# Patient Record
Sex: FEMALE | Race: WHITE | NOT HISPANIC OR LATINO | Employment: OTHER | URBAN - METROPOLITAN AREA
[De-identification: names, ages, dates, MRNs, and addresses within clinical notes are randomized per-mention and may not be internally consistent; named-entity substitution may affect disease eponyms.]

---

## 2018-04-28 ENCOUNTER — HOSPITAL ENCOUNTER (INPATIENT)
Facility: HOSPITAL | Age: 73
LOS: 5 days | Discharge: HOME | DRG: 307 | End: 2018-05-04
Attending: THORACIC SURGERY (CARDIOTHORACIC VASCULAR SURGERY) | Admitting: THORACIC SURGERY (CARDIOTHORACIC VASCULAR SURGERY)
Payer: MEDICARE

## 2018-04-28 DIAGNOSIS — I35.0 NONRHEUMATIC AORTIC VALVE STENOSIS: Primary | ICD-10-CM

## 2018-04-28 DIAGNOSIS — Z95.2 H/O MITRAL VALVE REPLACEMENT WITH MECHANICAL VALVE: ICD-10-CM

## 2018-04-29 LAB
APTT PPP: 32 SECONDS (ref 23–35)
APTT PPP: 40 SECONDS (ref 23–35)
BACTERIA UR QL AUTO: ABNORMAL /HPF
BILIRUB UR QL STRIP: NEGATIVE
CHOLEST SERPL-MCNC: 106 MG/DL (ref 50–200)
CLARITY UR: CLEAR
COLOR UR: YELLOW
ERYTHROCYTE [DISTWIDTH] IN BLOOD BY AUTOMATED COUNT: 14.4 % (ref 11.6–15.1)
EST. AVERAGE GLUCOSE BLD GHB EST-MCNC: 126 MG/DL
GLUCOSE UR STRIP-MCNC: NEGATIVE MG/DL
HBA1C MFR BLD: 6 % (ref 4.2–6.3)
HCT VFR BLD AUTO: 32.8 % (ref 34.8–46.1)
HDLC SERPL-MCNC: 54 MG/DL (ref 40–60)
HGB BLD-MCNC: 10.4 G/DL (ref 11.5–15.4)
HGB UR QL STRIP.AUTO: NEGATIVE
HYALINE CASTS #/AREA URNS LPF: ABNORMAL /LPF
INR PPP: 1.07 (ref 0.86–1.16)
KETONES UR STRIP-MCNC: NEGATIVE MG/DL
LDLC SERPL CALC-MCNC: 33 MG/DL (ref 0–100)
LEUKOCYTE ESTERASE UR QL STRIP: ABNORMAL
MCH RBC QN AUTO: 29.6 PG (ref 26.8–34.3)
MCHC RBC AUTO-ENTMCNC: 31.7 G/DL (ref 31.4–37.4)
MCV RBC AUTO: 93 FL (ref 82–98)
NITRITE UR QL STRIP: NEGATIVE
NON-SQ EPI CELLS URNS QL MICRO: ABNORMAL /HPF
NONHDLC SERPL-MCNC: 52 MG/DL
NT-PROBNP SERPL-MCNC: 882 PG/ML
PH UR STRIP.AUTO: 6.5 [PH] (ref 4.5–8)
PLATELET # BLD AUTO: 251 THOUSANDS/UL (ref 149–390)
PMV BLD AUTO: 11.9 FL (ref 8.9–12.7)
PROT UR STRIP-MCNC: NEGATIVE MG/DL
PROTHROMBIN TIME: 13.9 SECONDS (ref 12.1–14.4)
RBC # BLD AUTO: 3.51 MILLION/UL (ref 3.81–5.12)
RBC #/AREA URNS AUTO: ABNORMAL /HPF
SP GR UR STRIP.AUTO: 1.01 (ref 1–1.03)
TRIGL SERPL-MCNC: 94 MG/DL
UROBILINOGEN UR QL STRIP.AUTO: 0.2 E.U./DL
WBC # BLD AUTO: 5.3 THOUSAND/UL (ref 4.31–10.16)
WBC #/AREA URNS AUTO: ABNORMAL /HPF

## 2018-04-29 PROCEDURE — 81001 URINALYSIS AUTO W/SCOPE: CPT | Performed by: PHYSICIAN ASSISTANT

## 2018-04-29 PROCEDURE — 85730 THROMBOPLASTIN TIME PARTIAL: CPT | Performed by: PHYSICIAN ASSISTANT

## 2018-04-29 PROCEDURE — 85610 PROTHROMBIN TIME: CPT | Performed by: PHYSICIAN ASSISTANT

## 2018-04-29 PROCEDURE — 83036 HEMOGLOBIN GLYCOSYLATED A1C: CPT | Performed by: PHYSICIAN ASSISTANT

## 2018-04-29 PROCEDURE — 80061 LIPID PANEL: CPT | Performed by: PHYSICIAN ASSISTANT

## 2018-04-29 PROCEDURE — 87147 CULTURE TYPE IMMUNOLOGIC: CPT | Performed by: PHYSICIAN ASSISTANT

## 2018-04-29 PROCEDURE — 83880 ASSAY OF NATRIURETIC PEPTIDE: CPT | Performed by: PHYSICIAN ASSISTANT

## 2018-04-29 PROCEDURE — 85027 COMPLETE CBC AUTOMATED: CPT | Performed by: PHYSICIAN ASSISTANT

## 2018-04-29 PROCEDURE — 87081 CULTURE SCREEN ONLY: CPT | Performed by: PHYSICIAN ASSISTANT

## 2018-04-29 PROCEDURE — 85730 THROMBOPLASTIN TIME PARTIAL: CPT | Performed by: THORACIC SURGERY (CARDIOTHORACIC VASCULAR SURGERY)

## 2018-04-29 PROCEDURE — 99223 1ST HOSP IP/OBS HIGH 75: CPT | Performed by: THORACIC SURGERY (CARDIOTHORACIC VASCULAR SURGERY)

## 2018-04-29 RX ORDER — PANTOPRAZOLE SODIUM 40 MG/1
40 TABLET, DELAYED RELEASE ORAL DAILY
Status: ON HOLD | COMMUNITY
End: 2018-05-04

## 2018-04-29 RX ORDER — GABAPENTIN 100 MG/1
100 CAPSULE ORAL
Status: DISCONTINUED | OUTPATIENT
Start: 2018-04-29 | End: 2018-05-04 | Stop reason: HOSPADM

## 2018-04-29 RX ORDER — PANTOPRAZOLE SODIUM 40 MG/1
40 TABLET, DELAYED RELEASE ORAL DAILY
Status: DISCONTINUED | OUTPATIENT
Start: 2018-04-29 | End: 2018-05-04 | Stop reason: HOSPADM

## 2018-04-29 RX ORDER — TEMAZEPAM 15 MG/1
15 CAPSULE ORAL
Status: DISCONTINUED | OUTPATIENT
Start: 2018-04-29 | End: 2018-05-04 | Stop reason: HOSPADM

## 2018-04-29 RX ORDER — PRAVASTATIN SODIUM 20 MG
40 TABLET ORAL
Status: ON HOLD | COMMUNITY
End: 2018-05-04

## 2018-04-29 RX ORDER — MELATONIN
2000 DAILY
Status: ON HOLD | COMMUNITY
End: 2018-05-04

## 2018-04-29 RX ORDER — CLOPIDOGREL BISULFATE 75 MG/1
75 TABLET ORAL DAILY
Status: DISCONTINUED | OUTPATIENT
Start: 2018-04-29 | End: 2018-05-04 | Stop reason: HOSPADM

## 2018-04-29 RX ORDER — LOPERAMIDE HYDROCHLORIDE 2 MG/1
4 CAPSULE ORAL 4 TIMES DAILY PRN
Status: ON HOLD | COMMUNITY
End: 2018-05-04

## 2018-04-29 RX ORDER — ISOSORBIDE DINITRATE 5 MG/1
5 TABLET ORAL EVERY 8 HOURS
Status: ON HOLD | COMMUNITY
End: 2018-05-04

## 2018-04-29 RX ORDER — DIPHENOXYLATE HYDROCHLORIDE AND ATROPINE SULFATE 2.5; .025 MG/1; MG/1
1 TABLET ORAL 4 TIMES DAILY PRN
Status: DISCONTINUED | OUTPATIENT
Start: 2018-04-29 | End: 2018-05-04 | Stop reason: HOSPADM

## 2018-04-29 RX ORDER — ASPIRIN 81 MG/1
81 TABLET, CHEWABLE ORAL DAILY
Status: DISCONTINUED | OUTPATIENT
Start: 2018-04-29 | End: 2018-05-04 | Stop reason: HOSPADM

## 2018-04-29 RX ORDER — CALCIUM POLYCARBOPHIL 625 MG 625 MG/1
625 TABLET ORAL 3 TIMES DAILY
Status: ON HOLD | COMMUNITY
End: 2018-05-04

## 2018-04-29 RX ORDER — MECLIZINE HCL 12.5 MG/1
12.5 TABLET ORAL DAILY
Status: DISCONTINUED | OUTPATIENT
Start: 2018-04-29 | End: 2018-05-04 | Stop reason: HOSPADM

## 2018-04-29 RX ORDER — CLOPIDOGREL BISULFATE 75 MG/1
75 TABLET ORAL DAILY
Status: ON HOLD | COMMUNITY
End: 2018-05-04

## 2018-04-29 RX ORDER — LISINOPRIL 5 MG/1
2.5 TABLET ORAL DAILY
Status: ON HOLD | COMMUNITY
End: 2018-05-04

## 2018-04-29 RX ORDER — AMLODIPINE BESYLATE 2.5 MG/1
2.5 TABLET ORAL DAILY
Status: DISCONTINUED | OUTPATIENT
Start: 2018-04-29 | End: 2018-04-29

## 2018-04-29 RX ORDER — ACETAMINOPHEN 325 MG/1
650 TABLET ORAL EVERY 6 HOURS PRN
Status: DISCONTINUED | OUTPATIENT
Start: 2018-04-29 | End: 2018-05-04 | Stop reason: HOSPADM

## 2018-04-29 RX ORDER — DILTIAZEM HYDROCHLORIDE 120 MG/1
120 TABLET, FILM COATED ORAL 4 TIMES DAILY
Status: ON HOLD | COMMUNITY
End: 2018-05-04

## 2018-04-29 RX ORDER — MELATONIN
2000 DAILY
Status: DISCONTINUED | OUTPATIENT
Start: 2018-04-29 | End: 2018-05-04 | Stop reason: HOSPADM

## 2018-04-29 RX ORDER — TRAMADOL HYDROCHLORIDE 50 MG/1
50 TABLET ORAL EVERY 6 HOURS PRN
Status: DISCONTINUED | OUTPATIENT
Start: 2018-04-29 | End: 2018-05-04 | Stop reason: HOSPADM

## 2018-04-29 RX ORDER — LISINOPRIL 2.5 MG/1
2.5 TABLET ORAL DAILY
Status: DISCONTINUED | OUTPATIENT
Start: 2018-04-29 | End: 2018-04-29

## 2018-04-29 RX ORDER — DIPHENOXYLATE HYDROCHLORIDE AND ATROPINE SULFATE 2.5; .025 MG/1; MG/1
1 TABLET ORAL 4 TIMES DAILY PRN
COMMUNITY

## 2018-04-29 RX ORDER — MELOXICAM 7.5 MG/1
7.5 TABLET ORAL 2 TIMES DAILY PRN
Status: DISCONTINUED | OUTPATIENT
Start: 2018-04-29 | End: 2018-04-29

## 2018-04-29 RX ORDER — PRAVASTATIN SODIUM 40 MG
40 TABLET ORAL
Status: DISCONTINUED | OUTPATIENT
Start: 2018-04-29 | End: 2018-05-04 | Stop reason: HOSPADM

## 2018-04-29 RX ORDER — WARFARIN SODIUM 1 MG/1
2 TABLET ORAL
Status: COMPLETED | OUTPATIENT
Start: 2018-04-29 | End: 2018-04-29

## 2018-04-29 RX ORDER — DILTIAZEM HYDROCHLORIDE 60 MG/1
120 TABLET, FILM COATED ORAL 4 TIMES DAILY
Status: DISCONTINUED | OUTPATIENT
Start: 2018-04-29 | End: 2018-04-29

## 2018-04-29 RX ORDER — FUROSEMIDE 40 MG/1
40 TABLET ORAL DAILY
Status: ON HOLD | COMMUNITY
End: 2018-05-04

## 2018-04-29 RX ORDER — CETIRIZINE HYDROCHLORIDE 10 MG/1
10 TABLET ORAL DAILY
Status: ON HOLD | COMMUNITY
Start: 2016-11-22 | End: 2018-05-04

## 2018-04-29 RX ORDER — GABAPENTIN 100 MG/1
100 CAPSULE ORAL
Status: ON HOLD | COMMUNITY
Start: 2016-11-22 | End: 2018-05-04

## 2018-04-29 RX ORDER — LORATADINE 10 MG/1
10 TABLET ORAL DAILY
Status: DISCONTINUED | OUTPATIENT
Start: 2018-04-29 | End: 2018-05-04 | Stop reason: HOSPADM

## 2018-04-29 RX ORDER — AMLODIPINE BESYLATE 2.5 MG/1
2.5 TABLET ORAL DAILY
Status: ON HOLD | COMMUNITY
End: 2018-05-04

## 2018-04-29 RX ORDER — FUROSEMIDE 40 MG/1
40 TABLET ORAL DAILY
Status: DISCONTINUED | OUTPATIENT
Start: 2018-04-29 | End: 2018-05-04

## 2018-04-29 RX ORDER — HEPARIN SODIUM 10000 [USP'U]/100ML
3-20 INJECTION, SOLUTION INTRAVENOUS
Status: DISCONTINUED | OUTPATIENT
Start: 2018-04-29 | End: 2018-05-04 | Stop reason: HOSPADM

## 2018-04-29 RX ORDER — MELOXICAM 7.5 MG/1
7.5 TABLET ORAL 2 TIMES DAILY PRN
Status: ON HOLD | COMMUNITY
End: 2018-05-04

## 2018-04-29 RX ORDER — ISOSORBIDE DINITRATE 10 MG/1
5 TABLET ORAL EVERY 8 HOURS SCHEDULED
Status: DISCONTINUED | OUTPATIENT
Start: 2018-04-29 | End: 2018-05-04 | Stop reason: HOSPADM

## 2018-04-29 RX ORDER — ASPIRIN 81 MG/1
81 TABLET, CHEWABLE ORAL DAILY
Status: ON HOLD | COMMUNITY
End: 2018-05-04

## 2018-04-29 RX ADMIN — PANTOPRAZOLE SODIUM 40 MG: 40 TABLET, DELAYED RELEASE ORAL at 13:09

## 2018-04-29 RX ADMIN — ASPIRIN 81 MG 81 MG: 81 TABLET ORAL at 13:09

## 2018-04-29 RX ADMIN — ACETAMINOPHEN 650 MG: 325 TABLET, FILM COATED ORAL at 13:08

## 2018-04-29 RX ADMIN — LORATADINE 10 MG: 10 TABLET ORAL at 13:09

## 2018-04-29 RX ADMIN — CLOPIDOGREL BISULFATE 75 MG: 75 TABLET ORAL at 13:09

## 2018-04-29 RX ADMIN — VITAMIN D, TAB 1000IU (100/BT) 2000 UNITS: 25 TAB at 13:09

## 2018-04-29 RX ADMIN — PSYLLIUM HUSK 1 PACKET: 3.4 POWDER ORAL at 13:34

## 2018-04-29 RX ADMIN — PSYLLIUM HUSK 1 PACKET: 3.4 POWDER ORAL at 21:17

## 2018-04-29 RX ADMIN — PRAVASTATIN SODIUM 40 MG: 40 TABLET ORAL at 21:17

## 2018-04-29 RX ADMIN — GABAPENTIN 100 MG: 100 CAPSULE ORAL at 21:17

## 2018-04-29 RX ADMIN — HEPARIN SODIUM 12 UNITS/KG/HR: 10000 INJECTION, SOLUTION INTRAVENOUS at 08:25

## 2018-04-29 RX ADMIN — TRAMADOL HYDROCHLORIDE 50 MG: 50 TABLET, FILM COATED ORAL at 10:02

## 2018-04-29 RX ADMIN — MECLIZINE HCL 12.5 MG 12.5 MG: 12.5 TABLET ORAL at 13:09

## 2018-04-29 RX ADMIN — ISOSORBIDE DINITRATE 5 MG: 10 TABLET ORAL at 21:17

## 2018-04-29 RX ADMIN — TRAMADOL HYDROCHLORIDE 50 MG: 50 TABLET, FILM COATED ORAL at 19:41

## 2018-04-29 RX ADMIN — WARFARIN SODIUM 2 MG: 1 TABLET ORAL at 17:00

## 2018-04-29 NOTE — SOCIAL WORK
CM met with patient at bedside to explain CM role and discuss d/c plan  Pt lives with  Anai Patel [phone] in a bi-level home with 3-4 MARLINE  Pt was independent with ADLs PTA  No DME  Previous HHC approximately 2 years ago but unsure of agency  No history of STR  Preferred Rx:  [facility] in [place]  No history of MH or D/A treatment  Patient/caregiver received discharge checklist  Content reviewed  Patient/caregiver encouraged to participate in discharge plan of care prior to discharge home  CM reviewed d/c planning process including the following: identifying help at home, patient preference for d/c planning needs, Discharge Lounge, [facility] Meds to Bed program, availability of treatment team to discuss questions or concerns patient and/or family may have regarding understanding medications and recognizing signs and symptoms once discharged  CM also encouraged patient to follow up with all recommended appointments after discharge  Patient advised of importance for patient and family to participate in managing patients medical well being

## 2018-04-29 NOTE — PROGRESS NOTES
Pt transferred from [facility] 23 on heparin gtt for CT surg eval for TVAR  No admission ordered  Notified CHARLES Nowak  Was told CHARLES RITCHIE does not admit CT Surg patients and instructed me to call the attending  Notified Diego Stone  He stated, "No orders until the morning " Heparin gtt shut off  Will continue to monitor

## 2018-04-29 NOTE — H&P
H&P Exam - Cardiothoracic Surgery   Ivan Saleh 81 y o  female MRN: [id number]  Unit/Bed#: [facility] 425-01 Encounter: [id number]    Assessment/Plan     Assessment:  Aortic stenosis    Plan:  Pt seen and examined  She will require preoperative TAVR workup, which we will initiate  She appears comfortable and stable, on RA  History of Present Illness   HPI:  Ivan Saleh is a 81 y o  female who was transferred from [address] [place] [address] for the evaluation of aortic stenosis  The patient has had multiple admissions to [place] recently for SOB, chest pain  She was readmitted there on April 21st with CP and SOB  An echo showed moderate AS with a mean gradient of 30mm Hg  She was evaluated by the cardiothoracic team there, who quoted her with a second time redo surgery STS mortality of 14 7% and morbidity of 37%  She was supposed to be transferred to [place] [address], but was told she would not be able to have a TAVR for several weeks and required inpatient hospitalization until her surgery  She was thus transferred to [facility] at the request of her cardiologist, Dr Jeana Kruse  The patient has a history of MR and CAD  She underwent MV Repair St  Victor Manuel Ring and CABG x 2 (CHU, LCx) by Halima Blanco in September 2008  She then underwent Redo Sternotomy, Mitral Valve Replacement (25mm St  Victor Manuel Mechanical) in January 2008 by Dr John Adler  Since then, she has had several cardiac stents placed  Most recently, she was sent to [facility] April 3 and had stents placed to her LIMA and RCA  She lives at home with her  and son  She is active and works part time at a [employer] 4 hours per day  She quit smoking 2 years ago and has a 15 pack year history  She denies alcohol or drug use  She has full upper and lower dentures  Review of Systems   Constitutional: Positive for fatigue  Negative for chills, diaphoresis and fever  HENT: Negative  Eyes: Negative      Respiratory: Positive for chest tightness and shortness of breath  Negative for apnea, choking and stridor  Cardiovascular: Positive for chest pain and leg swelling  Negative for palpitations  Gastrointestinal: Negative for abdominal pain, constipation, diarrhea, nausea and vomiting  Endocrine: Negative  Genitourinary: Negative  Musculoskeletal: Positive for back pain  Skin: Negative  Neurological: Positive for dizziness, syncope and light-headedness  Negative for seizures  Hematological: Negative  Psychiatric/Behavioral: Negative  Historical Information   Past Medical History:   Diagnosis Date    Aortic valvar stenosis     CAD (coronary artery disease) of bypass graft     Chronic CHF (congestive heart failure) ([facility])     DM2 (diabetes mellitus, type 2) ([facility])     H/O Hodgkin's lymphoma     History of cervical cancer     History of ischemic cardiomyopathy     History of uterine cancer     HLD (hyperlipidemia)     HTN (hypertension)     Mitral regurgitation     PAD (peripheral artery disease) ([facility])     Renal artery stenosis ([facility])     s/p renal artery stent    SSS (sick sinus syndrome) ([facility])     s/p ppm     Past Surgical History:   Procedure Laterality Date    APPENDECTOMY      CARDIAC PACEMAKER PLACEMENT      CARDIAC SURGERY  09/2007    MV Repair, CABG x 2 by Dr Brodie Mauro @ [address] [place] [address]  01/2008    Redo Sternotomy, MVR #25mm St  Victor Manuel Mechanical    CHOLECYSTECTOMY      COLECTOMY      RENAL ARTERY STENT      TONSILLECTOMY       Social History   History   Alcohol use Not on file     History   Drug use: Unknown     History   Smoking Status    Not on file   Smokeless Tobacco    Not on file     Family History: positive for coronary disease    Meds/Allergies   PTA meds:   Prior to Admission Medications   Prescriptions Last Dose Informant Patient Reported? Taking?    amLODIPine (NORVASC) 2 5 mg tablet 4/28/2018 at Unknown time  Yes Yes   Sig: Take 2 5 mg by mouth daily   aspirin 81 mg chewable tablet 4/28/2018 at Unknown time  Yes Yes   Sig: Chew 81 mg daily   calcium polycarbophil (FIBERCON) 625 mg tablet Unknown at Unknown time  Yes No   Sig: Take 625 mg by mouth 3 (three) times a day   cetirizine (ZyrTEC) 10 mg tablet Unknown at Unknown time  Yes No   Sig: Take 10 mg by mouth daily   cholecalciferol (VITAMIN D3) 1,000 units tablet 4/28/2018 at Unknown time  Yes Yes   Sig: Take 2,000 Units by mouth daily   clopidogrel (PLAVIX) 75 mg tablet 4/28/2018 at Unknown time  Yes Yes   Sig: Take 75 mg by mouth daily   diltiazem (CARDIZEM) 120 MG tablet 4/28/2018 at Unknown time  Yes Yes   Sig: Take 120 mg by mouth 4 (four) times a day   diphenoxylate-atropine (LOMOTIL) 2 5-0 025 mg per tablet 4/28/2018 at Unknown time  Yes Yes   Sig: Take 1 tablet by mouth 4 (four) times a day as needed   furosemide (LASIX) 40 mg tablet 4/28/2018 at Unknown time  Yes Yes   Sig: Take 40 mg by mouth daily   gabapentin (NEURONTIN) 100 mg capsule 4/28/2018 at Unknown time  Yes Yes   Sig: Take 100 mg by mouth daily at bedtime   isosorbide dinitrate (ISORDIL) 5 mg tablet 4/28/2018 at Unknown time  Yes Yes   Sig: Take 5 mg by mouth every 8 (eight) hours   lisinopril (ZESTRIL) 5 mg tablet 4/28/2018 at Unknown time  Yes Yes   Sig: Take 2 5 mg by mouth daily   loperamide (IMODIUM) 2 mg capsule 4/28/2018 at Unknown time  Yes Yes   Sig: Take 4 mg by mouth 4 (four) times a day as needed for diarrhea   meclizine (ANTIVERT) 32 MG tablet 4/28/2018 at Unknown time  Yes Yes   Sig: Take 12 5 mg by mouth daily   meloxicam (MOBIC) 7 5 mg tablet 4/28/2018 at Unknown time  Yes Yes   Sig: Take 7 5 mg by mouth 2 (two) times a day as needed   pantoprazole (PROTONIX) 40 mg tablet 4/28/2018 at Unknown time  Yes Yes   Sig: Take 40 mg by mouth daily   pravastatin (PRAVACHOL) 20 mg tablet 4/28/2018 at Unknown time  Yes Yes   Sig: Take 40 mg by mouth daily at bedtime      Facility-Administered Medications: None     Allergies   Allergen Reactions    Contrast [Iodinated Diagnostic Agents] Throat Swelling    Penicillins Throat Swelling    Ranolazine Vomiting     ranexa    Sulfa Antibiotics Throat Swelling       Objective   Vitals: Blood pressure (P) 106/51, pulse (P) 70, temperature (P) 98 °F (36 7 °C), temperature source (P) Oral, resp  rate (P) 18, height 4' 11" (1 499 m), weight 53 5 kg (117 lb 15 1 oz), SpO2 (P) 95 %  Invasive Devices     Peripheral Intravenous Line            Peripheral IV 04/29/18 Right;Upper Antecubital less than 1 day                Physical Exam   Constitutional: She is oriented to person, place, and time  She appears well-developed and well-nourished  No distress  HENT:   Head: Normocephalic and atraumatic  Mouth/Throat: No oropharyngeal exudate  Eyes: EOM are normal  Pupils are equal, round, and reactive to light  Neck: Normal range of motion  Neck supple  No JVD present  Cardiovascular: Normal rate and regular rhythm  Murmur heard  Pulmonary/Chest: Effort normal and breath sounds normal  No respiratory distress  She has no wheezes  She has no rales  Abdominal: Soft  Bowel sounds are normal  She exhibits no distension  There is no tenderness  There is no guarding  Musculoskeletal: Normal range of motion  She exhibits edema  She exhibits no tenderness or deformity  Lymphadenopathy:     She has no cervical adenopathy  Neurological: She is alert and oriented to person, place, and time  No cranial nerve deficit  Skin: Skin is warm and dry  No rash noted  She is not diaphoretic  No erythema  Psychiatric: She has a normal mood and affect  Her behavior is normal  Judgment and thought content normal        Lab Results:   I have personally reviewed pertinent reports    , CBC with diff:   Lab Results   Component Value Date    WBC 5 30 04/29/2018    HGB 10 4 (L) 04/29/2018    HCT 32 8 (L) 04/29/2018    MCV 93 04/29/2018     04/29/2018    MCH 29 6 04/29/2018    MCHC 31 7 04/29/2018    RDW 14 4 04/29/2018    MPV 11 9 04/29/2018   , BMP/CMP: No results found for: NA, K, CL, CO2, ANIONGAP, BUN, CREATININE, GLUCOSE, CALCIUM, AST, ALT, ALKPHOS, PROT, ALBUMIN, BILITOT, EGFR, Coags:   Lab Results   Component Value Date    PTT 32 04/29/2018    INR 1 07 04/29/2018     Imaging: I have personally reviewed pertinent reports  Outside imaging will be uploaded into PACs  EKG, Pathology, and Other Studies: I have personally reviewed pertinent reports  Code Status: Level 1 - Full Code  Advance Directive and Living Will:      Power of :    POLST:      Counseling / Coordination of Care  Total floor / unit time spent today 30 minutes  Greater than 50% of total time was spent with the patient and / or family counseling and / or coordination of care  A description of the counseling / coordination of care: preop testing, procedure and postoperative course, risks/benenfits

## 2018-04-30 ENCOUNTER — APPOINTMENT (INPATIENT)
Dept: NON INVASIVE DIAGNOSTICS | Facility: HOSPITAL | Age: 73
DRG: 307 | End: 2018-04-30
Payer: MEDICARE

## 2018-04-30 ENCOUNTER — APPOINTMENT (INPATIENT)
Dept: PULMONOLOGY | Facility: HOSPITAL | Age: 73
DRG: 307 | End: 2018-04-30
Payer: MEDICARE

## 2018-04-30 LAB
ALBUMIN SERPL BCP-MCNC: 3.1 G/DL (ref 3.5–5)
ALP SERPL-CCNC: 75 U/L (ref 46–116)
ALT SERPL W P-5'-P-CCNC: 26 U/L (ref 12–78)
ANION GAP SERPL CALCULATED.3IONS-SCNC: 6 MMOL/L (ref 4–13)
APTT PPP: 60 SECONDS (ref 23–35)
APTT PPP: 75 SECONDS (ref 23–35)
AST SERPL W P-5'-P-CCNC: 29 U/L (ref 5–45)
ATRIAL RATE: 70 BPM
BILIRUB SERPL-MCNC: 0.28 MG/DL (ref 0.2–1)
BUN SERPL-MCNC: 17 MG/DL (ref 5–25)
CALCIUM SERPL-MCNC: 9.1 MG/DL (ref 8.3–10.1)
CHLORIDE SERPL-SCNC: 101 MMOL/L (ref 100–108)
CO2 SERPL-SCNC: 31 MMOL/L (ref 21–32)
CREAT SERPL-MCNC: 1.22 MG/DL (ref 0.6–1.3)
GFR SERPL CREATININE-BSD FRML MDRD: 45 ML/MIN/1.73SQ M
GLUCOSE SERPL-MCNC: 90 MG/DL (ref 65–140)
INR PPP: 1.08 (ref 0.86–1.16)
P AXIS: 66 DEGREES
POTASSIUM SERPL-SCNC: 4.4 MMOL/L (ref 3.5–5.3)
PR INTERVAL: 194 MS
PROT SERPL-MCNC: 7 G/DL (ref 6.4–8.2)
PROTHROMBIN TIME: 14 SECONDS (ref 12.1–14.4)
QRS AXIS: 51 DEGREES
QRSD INTERVAL: 94 MS
QT INTERVAL: 466 MS
QTC INTERVAL: 503 MS
SODIUM SERPL-SCNC: 138 MMOL/L (ref 136–145)
T WAVE AXIS: 185 DEGREES
VENTRICULAR RATE: 70 BPM

## 2018-04-30 PROCEDURE — 93880 EXTRACRANIAL BILAT STUDY: CPT

## 2018-04-30 PROCEDURE — 94060 EVALUATION OF WHEEZING: CPT

## 2018-04-30 PROCEDURE — 76377 3D RENDER W/INTRP POSTPROCES: CPT

## 2018-04-30 PROCEDURE — 99232 SBSQ HOSP IP/OBS MODERATE 35: CPT | Performed by: THORACIC SURGERY (CARDIOTHORACIC VASCULAR SURGERY)

## 2018-04-30 PROCEDURE — 93010 ELECTROCARDIOGRAM REPORT: CPT | Performed by: INTERNAL MEDICINE

## 2018-04-30 PROCEDURE — 94726 PLETHYSMOGRAPHY LUNG VOLUMES: CPT | Performed by: INTERNAL MEDICINE

## 2018-04-30 PROCEDURE — 93325 DOPPLER ECHO COLOR FLOW MAPG: CPT | Performed by: INTERNAL MEDICINE

## 2018-04-30 PROCEDURE — 85610 PROTHROMBIN TIME: CPT | Performed by: PHYSICIAN ASSISTANT

## 2018-04-30 PROCEDURE — 99223 1ST HOSP IP/OBS HIGH 75: CPT | Performed by: INTERNAL MEDICINE

## 2018-04-30 PROCEDURE — 93880 EXTRACRANIAL BILAT STUDY: CPT | Performed by: SURGERY

## 2018-04-30 PROCEDURE — 93312 ECHO TRANSESOPHAGEAL: CPT | Performed by: INTERNAL MEDICINE

## 2018-04-30 PROCEDURE — 94060 EVALUATION OF WHEEZING: CPT | Performed by: INTERNAL MEDICINE

## 2018-04-30 PROCEDURE — 93005 ELECTROCARDIOGRAM TRACING: CPT

## 2018-04-30 PROCEDURE — 80053 COMPREHEN METABOLIC PANEL: CPT | Performed by: PHYSICIAN ASSISTANT

## 2018-04-30 PROCEDURE — 93312 ECHO TRANSESOPHAGEAL: CPT

## 2018-04-30 PROCEDURE — 94760 N-INVAS EAR/PLS OXIMETRY 1: CPT

## 2018-04-30 PROCEDURE — 94726 PLETHYSMOGRAPHY LUNG VOLUMES: CPT

## 2018-04-30 PROCEDURE — 93320 DOPPLER ECHO COMPLETE: CPT | Performed by: INTERNAL MEDICINE

## 2018-04-30 PROCEDURE — 85730 THROMBOPLASTIN TIME PARTIAL: CPT | Performed by: INTERNAL MEDICINE

## 2018-04-30 RX ORDER — SODIUM CHLORIDE 9 MG/ML
INJECTION, SOLUTION INTRAVENOUS CONTINUOUS PRN
Status: DISCONTINUED | OUTPATIENT
Start: 2018-04-30 | End: 2018-04-30 | Stop reason: SURG

## 2018-04-30 RX ORDER — LIDOCAINE HYDROCHLORIDE 10 MG/ML
INJECTION, SOLUTION EPIDURAL; INFILTRATION; INTRACAUDAL; PERINEURAL AS NEEDED
Status: DISCONTINUED | OUTPATIENT
Start: 2018-04-30 | End: 2018-04-30 | Stop reason: SURG

## 2018-04-30 RX ORDER — WARFARIN SODIUM 5 MG/1
5 TABLET ORAL
Status: DISCONTINUED | OUTPATIENT
Start: 2018-04-30 | End: 2018-04-30

## 2018-04-30 RX ORDER — KETAMINE HYDROCHLORIDE 50 MG/ML
INJECTION, SOLUTION, CONCENTRATE INTRAMUSCULAR; INTRAVENOUS AS NEEDED
Status: DISCONTINUED | OUTPATIENT
Start: 2018-04-30 | End: 2018-04-30 | Stop reason: SURG

## 2018-04-30 RX ORDER — PROPOFOL 10 MG/ML
INJECTION, EMULSION INTRAVENOUS AS NEEDED
Status: DISCONTINUED | OUTPATIENT
Start: 2018-04-30 | End: 2018-04-30 | Stop reason: SURG

## 2018-04-30 RX ORDER — GLYCOPYRROLATE 0.2 MG/ML
INJECTION INTRAMUSCULAR; INTRAVENOUS AS NEEDED
Status: DISCONTINUED | OUTPATIENT
Start: 2018-04-30 | End: 2018-04-30 | Stop reason: SURG

## 2018-04-30 RX ORDER — METHYLPREDNISOLONE 16 MG/1
32 TABLET ORAL ONCE
Status: COMPLETED | OUTPATIENT
Start: 2018-05-01 | End: 2018-05-01

## 2018-04-30 RX ORDER — WARFARIN SODIUM 1 MG/1
2 TABLET ORAL
Status: COMPLETED | OUTPATIENT
Start: 2018-04-30 | End: 2018-04-30

## 2018-04-30 RX ORDER — ALBUTEROL SULFATE 2.5 MG/3ML
2.5 SOLUTION RESPIRATORY (INHALATION) ONCE
Status: COMPLETED | OUTPATIENT
Start: 2018-04-30 | End: 2018-04-30

## 2018-04-30 RX ORDER — DIPHENHYDRAMINE HCL 25 MG
50 TABLET ORAL ONCE
Status: COMPLETED | OUTPATIENT
Start: 2018-05-01 | End: 2018-05-01

## 2018-04-30 RX ORDER — METHYLPREDNISOLONE 16 MG/1
32 TABLET ORAL ONCE
Status: COMPLETED | OUTPATIENT
Start: 2018-04-30 | End: 2018-04-30

## 2018-04-30 RX ADMIN — MECLIZINE HCL 12.5 MG 12.5 MG: 12.5 TABLET ORAL at 10:01

## 2018-04-30 RX ADMIN — HEPARIN SODIUM 16 UNITS/KG/HR: 10000 INJECTION, SOLUTION INTRAVENOUS at 09:54

## 2018-04-30 RX ADMIN — PROPOFOL 10 MG: 10 INJECTION, EMULSION INTRAVENOUS at 13:59

## 2018-04-30 RX ADMIN — PROPOFOL 20 MG: 10 INJECTION, EMULSION INTRAVENOUS at 13:46

## 2018-04-30 RX ADMIN — VITAMIN D, TAB 1000IU (100/BT) 2000 UNITS: 25 TAB at 10:00

## 2018-04-30 RX ADMIN — PROPOFOL 10 MG: 10 INJECTION, EMULSION INTRAVENOUS at 14:02

## 2018-04-30 RX ADMIN — GLYCOPYRROLATE 0.1 MG: 0.2 INJECTION, SOLUTION INTRAMUSCULAR; INTRAVENOUS at 13:46

## 2018-04-30 RX ADMIN — PRAVASTATIN SODIUM 40 MG: 40 TABLET ORAL at 21:38

## 2018-04-30 RX ADMIN — PANTOPRAZOLE SODIUM 40 MG: 40 TABLET, DELAYED RELEASE ORAL at 10:01

## 2018-04-30 RX ADMIN — PROPOFOL 10 MG: 10 INJECTION, EMULSION INTRAVENOUS at 14:05

## 2018-04-30 RX ADMIN — TRAMADOL HYDROCHLORIDE 50 MG: 50 TABLET, FILM COATED ORAL at 05:43

## 2018-04-30 RX ADMIN — SACUBITRIL AND VALSARTAN 1 TABLET: 24; 26 TABLET, FILM COATED ORAL at 10:04

## 2018-04-30 RX ADMIN — LORATADINE 10 MG: 10 TABLET ORAL at 10:02

## 2018-04-30 RX ADMIN — ASPIRIN 81 MG 81 MG: 81 TABLET ORAL at 10:02

## 2018-04-30 RX ADMIN — LIDOCAINE HYDROCHLORIDE 50 MG: 10 INJECTION, SOLUTION EPIDURAL; INFILTRATION; INTRACAUDAL; PERINEURAL at 13:46

## 2018-04-30 RX ADMIN — WARFARIN SODIUM 2 MG: 1 TABLET ORAL at 18:24

## 2018-04-30 RX ADMIN — ALBUTEROL SULFATE 2.5 MG: 2.5 SOLUTION RESPIRATORY (INHALATION) at 08:28

## 2018-04-30 RX ADMIN — SACUBITRIL AND VALSARTAN 1 TABLET: 24; 26 TABLET, FILM COATED ORAL at 18:25

## 2018-04-30 RX ADMIN — TRAMADOL HYDROCHLORIDE 50 MG: 50 TABLET, FILM COATED ORAL at 19:44

## 2018-04-30 RX ADMIN — PSYLLIUM HUSK 1 PACKET: 3.4 POWDER ORAL at 18:24

## 2018-04-30 RX ADMIN — PROPOFOL 10 MG: 10 INJECTION, EMULSION INTRAVENOUS at 13:54

## 2018-04-30 RX ADMIN — ISOSORBIDE DINITRATE 5 MG: 10 TABLET ORAL at 18:23

## 2018-04-30 RX ADMIN — KETAMINE HYDROCHLORIDE 5 MG: 50 INJECTION, SOLUTION INTRAMUSCULAR; INTRAVENOUS at 13:48

## 2018-04-30 RX ADMIN — CLOPIDOGREL BISULFATE 75 MG: 75 TABLET ORAL at 10:02

## 2018-04-30 RX ADMIN — GABAPENTIN 100 MG: 100 CAPSULE ORAL at 21:38

## 2018-04-30 RX ADMIN — PROPOFOL 10 MG: 10 INJECTION, EMULSION INTRAVENOUS at 13:48

## 2018-04-30 RX ADMIN — SODIUM CHLORIDE: 0.9 INJECTION, SOLUTION INTRAVENOUS at 13:35

## 2018-04-30 RX ADMIN — METHYLPREDNISOLONE 32 MG: 16 TABLET ORAL at 21:38

## 2018-04-30 RX ADMIN — KETAMINE HYDROCHLORIDE 10 MG: 50 INJECTION, SOLUTION INTRAMUSCULAR; INTRAVENOUS at 13:46

## 2018-04-30 RX ADMIN — PROPOFOL 10 MG: 10 INJECTION, EMULSION INTRAVENOUS at 13:57

## 2018-04-30 RX ADMIN — FUROSEMIDE 40 MG: 40 TABLET ORAL at 10:01

## 2018-04-30 RX ADMIN — PROPOFOL 10 MG: 10 INJECTION, EMULSION INTRAVENOUS at 13:51

## 2018-04-30 RX ADMIN — ISOSORBIDE DINITRATE 5 MG: 10 TABLET ORAL at 21:38

## 2018-04-30 NOTE — PROGRESS NOTES
04/30/18 1800   Clinical Encounter Type   Visited With Patient   Routine Visit Follow-up   Rastafari Encounters   Rastafari Needs Sacred Text   Patient Spiritual Encounters   Spiritual Assessment 4

## 2018-04-30 NOTE — PLAN OF CARE

## 2018-04-30 NOTE — CASE MANAGEMENT
Initial Clinical Review    Admission: Date/Time/Statement: 04/29/2018 @ 0732  Orders Placed This Encounter   Procedures    Inpatient Admission     Standing Status:   Standing     Number of Occurrences:   1     Order Specific Question:   Admitting Physician     Answer: Cleveland Escamilla [[id number]]     Order Specific Question:   Level of Care     Answer:   Med Surg [[id number]]     Order Specific Question:   Estimated length of stay     Answer:   More than 2 Midnights     Order Specific Question:   Certification     Answer:   I certify that inpatient services are medically necessary for this patient for a duration of greater than two midnights  See H&P and MD Progress Notes for additional information about the patient's course of treatment  Presented to the ED @ [facility], transferred to [facility] via EMS, higher level of care    Chief Complaint: Chest Pain & SOB > Aortic Stenosis > TAVR    History of Illness:   Corey Alva is a 81 y o  female who was transferred from [address] [place] [address] for the evaluation of aortic stenosis  The patient has had multiple admissions to [facility] recently for SOB, chest pain  She was readmitted there on April 21st with CP and SOB  An echo showed moderate AS with a mean gradient of 30mm Hg  She was evaluated by the cardiothoracic team there, who quoted her with a second time redo surgery STS mortality of 14 7% and morbidity of 37%  She was supposed to be transferred to [place] [address], but was told she would not be able to have a TAVR for several weeks and required inpatient hospitalization until her surgery   She was thus transferred to [facility] at the request of her cardiologist, Dr Melissa Delgado      ED Vital Signs:   ED Triage Vitals   Temperature Pulse Respirations Blood Pressure SpO2   04/28/18 2300 04/28/18 2300 04/28/18 2300 04/28/18 2300 04/28/18 2300   98 3 °F (36 8 °C) 70 18 (!) 105/48 96 %      Temp Source Heart Rate Source Patient Position - Orthostatic VS BP Location FiO2 (%)   04/28/18 2300 04/29/18 0315 04/28/18 2300 04/28/18 2300 04/30/18 1340   Oral Monitor Lying Left arm 100      Pain Score       04/29/18 0315       No Pain        Wt Readings from Last 1 Encounters:   04/30/18 52 8 kg (116 lb 6 5 oz)       Abnormal Labs/Diagnostic Test Results:     Past Medical/Surgical History:   Past Medical History:   Diagnosis Date    Aortic valvar stenosis     CAD (coronary artery disease) of bypass graft     [facility]cong[facility]ive [facility] failure) ([facility])     DM2 (diabetes mellitus, type 2) ([facility])     H/O Hodgkin's lymphoma     History of cervical cancer     History of ischemic cardiomyopathy     History of uterine cancer     HLD (hyperlipidemia)     HTN (hypertension)     Mitral regurgitation     PAD (peripheral artery disease) ([facility])     Renal artery stenosis ([facility])     SSS (sick sinus syndrome) ([facility])        Admitting Diagnosis: Aortic stenosis [I35 0]    Age/Sex: 81 y o  female    Assessment/Plan:   Assessment:  Aortic stenosis     Plan:  Pt seen and examined  She will require preoperative TAVR workup, which we will initiate  She appears comfortable and stable, on RA     Admission Orders:  Scheduled Meds:   Current Facility-Administered Medications:  acetaminophen 650 mg Oral Q6H PRN Radha Robison PA-C    aspirin 81 mg Oral Daily Radha Robison PA-C    cholecalciferol 2,000 Units Oral Daily Radha Robison PA-C    clopidogrel 75 mg Oral Daily Radha Robison [place]    [START ON 5/1/2018] diphenhydrAMINE 50 mg Oral Once Monika Mosley PA-C    diphenoxylate-atropine 1 tablet Oral 4x Daily PRN Radha Robison PA-C    furosemide 40 mg Oral Daily Radha Robison PA-C    gabapentin 100 mg Oral HS Radha Robison PA-C    heparin (porcine) 3-20 Units/kg/hr (Order-Specific) Intravenous Titrated Radha Robison PA-C Last Rate: 16 Units/kg/hr (04/30/18 0954)   isosorbide dinitrate 5 mg Oral [facility] Harriet Mccabe PA-C    loratadine 10 mg Oral Daily Marielle Amezcua PA-C    meclizine 12 5 mg Oral Daily Marielle Amezcua [place]    methylprednisolone 32 mg Oral Once Bryon Saldaña [place]    [START ON 5/1/2018] methylprednisolone 32 mg Oral Once Bryon Saldaña PA-C    pantoprazole 40 mg Oral Daily Marielle Amezcua PA-C    pravastatin 40 mg Oral HS Marielle Amezcua PA-C    psyllium 1 packet Oral TID Marielle Amezcua PA-C    sacubitril-valsartan 1 tablet Oral BID Harriet Mccabe PA-C    temazepam 15 mg Oral HS PRN Marielle Amezcua PA-C    traMADol 50 mg Oral Q6H PRN Marielle Amezcua PA-C    warfarin 2 mg Oral Once (warfarin) Timothy Merritt PA-C      Continuous Infusions:   heparin (porcine) 3-20 Units/kg/hr (Order-Specific) Last Rate: 16 Units/kg/hr (04/30/18 0954)     3D LORA: pending  CTA chest/abd/pel; pending  Carotids / pending

## 2018-04-30 NOTE — RESTORATIVE TECHNICIAN NOTE
Restorative Specialist Mobility Note       Activity: Bathroom privileges, Chair     Assistive Device: None

## 2018-04-30 NOTE — PROGRESS NOTES
Progress Note - Cardiothoracic Surgery   Steven [facility] 81 y o  female MRN: [id number]  Unit/Bed#: [facility] 425-01 Encounter: [id number]      24 Hour Events:   CTA scheduled for tomorrow, with prep ordered  LORA, carotid ultrasound, pre-op labs, and PFTs have been ordered  Denies CP and SOB      Medications:   Scheduled Meds:  Current Facility-Administered Medications:  acetaminophen 650 mg Oral Q6H PRN [place], PA-C    aspirin 81 mg Oral Daily [place], PA-C    cholecalciferol 2,000 Units Oral Daily [place], PA-C    clopidogrel 75 mg Oral Daily [place], [place]    diphenoxylate-atropine 1 tablet Oral 4x Daily PRN [place], PA-C    furosemide 40 mg Oral Daily [place], PA-C    gabapentin 100 mg Oral HS [place], PA-C    heparin (porcine) 3-20 Units/kg/hr (Order-Specific) Intravenous Titrated [place], PA-C Last Rate: 16 Units/kg/hr (04/30/18 0954)   isosorbide dinitrate 5 mg Oral Q8H [facility] Harriet Mccabe PA-C    loratadine 10 mg Oral Daily [place], PA-SANTIAGO    meclizine 12 5 mg Oral Daily [place], PA-SANTIAGO    pantoprazole 40 mg Oral Daily [facility], PA-C    pravastatin 40 mg Oral HS [place], PA-C    psyllium 1 packet Oral TID [place], PA-C    sacubitril-valsartan 1 tablet Oral BID [place], PA-C    temazepam 15 mg Oral HS PRN [place], PA-C    traMADol 50 mg Oral Q6H PRN [place], PA-C    warfarin 5 mg Oral Once (warfarin) Timothy Merritt PA-C      Continuous Infusions:  heparin (porcine) 3-20 Units/kg/hr (Order-Specific) Last Rate: 16 Units/kg/hr (04/30/18 0954)       Results:     Results from last 7 days  Lab Units 04/29/18  0820   WBC Thousand/uL 5 30   HEMOGLOBIN g/dL 10 4*   HEMATOCRIT % 32 8*   PLATELETS Thousands/uL 251       Results from last 7 days  Lab Units 04/30/18  0456   SODIUM mmol/L 138   POTASSIUM mmol/L 4 4   CHLORIDE mmol/L 101   CO2 mmol/L 31   BUN mg/dL 17   CREATININE mg/dL 1 22   GLUCOSE RANDOM mg/dL 90   CALCIUM mg/dL 9 1       Results from last 7 days  Lab Units 04/30/18  0456 04/29/18  2326 04/29/18  1522 04/29/18  0820   INR  1 08  --   --  1 07   PTT seconds 60* 75* 40* 32       Vitals:   Vitals:    04/30/18 0540 04/30/18 0600 04/30/18 0723 04/30/18 1133   BP: 98/50  136/62 108/50   BP Location: Left arm  Left arm Left arm   Pulse:   70 72   Resp:   20 18   Temp:   98 2 °F (36 8 °C) 98 °F (36 7 °C)   TempSrc:   Oral Oral   SpO2:   96% 97%   Weight:  52 8 kg (116 lb 6 5 oz)     Height:           Physical Exam:  HEENT/NECK:  PERRLA  No jugular venous distention  Cardiac: Regular rate and rhythm  Pulmonary:  Breath clear bilaterally  Abdomen:  Non-tender, Non-distended  Positive bowel sounds  Lower extremities: Extremities warm/dry  Radial/PT/DP pulses 2+ bilaterally  Trace edema B/L  Neuro: Alert and oriented X 3  Sensation is grossly intact  No focal deficits  Skin: Warm/Dry, without rashes or lesions  Assessment:  Patient Active Problem List   Diagnosis    Aortic valvar stenosis    CAD (coronary artery disease) of bypass graft    [facility]congestive [facility] failure) ([facility] )    DM2 (diabetes mellitus, type 2) ([facility] )    HTN (hypertension)    HLD (hyperlipidemia)    History of ischemic cardiomyopathy    Mitral regurgitation     Severe aortic stenosis; Ongoing TAVR workup    Plan:  Patient agreeable to proceed with surgery; Awaiting results of pre-operative studies and labs  Will receive 5 mg Coumadin tonight     Transcatheter aortic valve replacement will be scheduled for with KYE Garcia Re: Fannie Duran PA-C  DATE: April 30, 2018  TIME: 11:52 AM

## 2018-04-30 NOTE — PHYSICIAN ADVISOR
Current patient class: Inpatient  The patient is currently on Hospital Day: 3      The patient was admitted to the hospital at [phone] on 4/28/18 for the following diagnosis:  Aortic stenosis [I35 0]       There is documentation in the medical record of an expected length of stay of at least 2 midnights  The patient is therefore expected to satisfy the 2 midnight benchmark and given the 2 midnight presumption is appropriate for INPATIENT ADMISSION  Given this expectation of a satisfying stay, CMS instructs us that the patient is most often appropriate for inpatient admission under part A provided medical necessity is documented in the chart  After review of the relevant documentation, labs, vital signs and test results, the patient is appropriate for INPATIENT ADMISSION  Admission to the hospital as an inpatient is a complex decision making process which requires the practitioner to consider the patients presenting complaint, history and physical examination and all relevant testing  With this in mind, in this case, the patient was deemed appropriate for INPATIENT ADMISSION  After review of the documentation and testing available at the time of the admission I concur with this clinical determination of medical necessity  Rationale is as follows: The patient is a 81 yrs old Female who presented to the ED at 4/28/2018 11:12 PM with a chief complaint of aortic stenosis  Patient was admitted to the hospital for evaluation by Cardiology in anticipation of TAVR  Patient will require at least 2 midnights in the hospital, and given the nature of the patient's transfer, acute medical care, the patient was appropriate for inpatient admission  The patient present time has satisfied the 2 midnight benchmark and will continue to remain hospitalized further        The patients vitals on arrival were ED Triage Vitals   Temperature Pulse Respirations Blood Pressure SpO2   04/28/18 2300 04/28/18 2300 04/28/18 2300 04/28/18 2300 04/28/18 2300   98 3 °F (36 8 °C) 70 18 (!) 105/48 96 %      Temp Source Heart Rate Source Patient Position - Orthostatic VS BP Location FiO2 (%)   04/28/18 2300 04/29/18 0315 04/28/18 2300 04/28/18 2300 04/30/18 1340   Oral Monitor Lying Left arm 100      Pain Score       04/29/18 0315       No Pain           Past Medical History:   Diagnosis Date    Aortic valvar stenosis     CAD (coronary artery disease) of bypass graft     Carotid stenosis, asymptomatic, bilateral 04/2018    50-69% b/l    [facility]estive [facility]     DM2 (diabetes mellitus, type 2) ([facility]     H/O Hodgkin's lymphoma     History of cervical cancer     History of ischemic cardiomyopathy     History of uterine cancer     HLD (hyperlipidemia)     HTN (hypertension)     LBBB (left bundle branch block)     Mitral regurgitation     PAD (peripheral artery disease) (HCC)     Renal artery stenosis (HCC)     s/p renal artery stent    SSS (sick sinus syndrome) ([facility] )     s/p ppm     Past Surgical History:   Procedure Laterality Date    APPENDECTOMY      CARDIAC PACEMAKER PLACEMENT      CARDIAC SURGERY  09/2007    MV Repair, CABG x 2 by Dr Pk Del Real @ [address] [place] [address]  01/2008    Redo Sternotomy, MVR #25mm St  Victor Manuel Mechanical    CHOLECYSTECTOMY      COLECTOMY      RENAL ARTERY STENT      TONSILLECTOMY             Consults have been placed to:   IP CONSULT TO CARDIOLOGY    Vitals:    04/30/18 0600 04/30/18 0723 04/30/18 1133 04/30/18 1515   BP:  136/62 108/50 98/58   BP Location:  Left arm Left arm Left arm   Pulse:  70 72 70   Resp:  20 18 20   Temp:  98 2 °F (36 8 °C) 98 °F (36 7 °C) 98 1 °F (36 7 °C)   TempSrc:  Oral Oral Oral   SpO2:  96% 97% 98%   Weight: 52 8 kg (116 lb 6 5 oz)      Height:           Most recent labs:    Recent Labs      04/29/18   0820  04/30/18   0456   WBC  5 30   --    HGB  10 4*   --    HCT  32 8*   --    PLT  251   --    K   --   4 4   NA   --   138   CALCIUM   --   9 1 BUN   --   17   CREATININE   --   1 22   INR  1 07  1 08   AST   --   29   ALT   --   26   ALKPHOS   --   75   BILITOT   --   0 28       Scheduled Meds:  Current Facility-Administered Medications:  acetaminophen 650 mg Oral Q6H PRN Nasima Zhu PA-C    aspirin 81 mg Oral Daily Nasima Zhu PA-C    cholecalciferol 2,000 Units Oral Daily Nasima Zhu PA-C    clopidogrel 75 mg Oral Daily Nemesio Ozuna    [START ON 5/1/2018] diphenhydrAMINE 50 mg Oral Once M D C  CONNER Conrad    diphenoxylate-atropine 1 tablet Oral 4x Daily PRN Nasima Zhu PA-C    furosemide 40 mg Oral Daily Nasima Zhu PA-C    gabapentin 100 mg Oral HS Nasima Zhu PA-C    heparin (porcine) 3-20 Units/kg/hr (Order-Specific) Intravenous Titrated Nasima Zhu PA-C Last Rate: 16 Units/kg/hr (04/30/18 0954)   isosorbide dinitrate 5 mg Oral Q8H [facility] Harriet Mccabe PA-C    loratadine 10 mg Oral Daily Nasima Zhu PA-C    meclizine 12 5 mg Oral Daily Nasima Zhu PA-C    methylprednisolone 32 mg Oral Once M D C  CONNER Conrad    [START ON 5/1/2018] methylprednisolone 32 mg Oral Once M D C  CONNER Conrad    pantoprazole 40 mg Oral Daily Nasima Zhu PA-C    pravastatin 40 mg Oral HS Harriet Mccabe PA-C    psyllium 1 packet Oral TID Nasima Zhu PA-C    sacubitril-valsartan 1 tablet Oral BID Harriet Mccabe PA-C    temazepam 15 mg Oral HS PRN Nasima Zhu PA-C    traMADol 50 mg Oral Q6H PRN Nasima Zhu PA-C      Continuous Infusions:  heparin (porcine) 3-20 Units/kg/hr (Order-Specific) Last Rate: 16 Units/kg/hr (04/30/18 0954)     PRN Meds:   acetaminophen    diphenoxylate-atropine    temazepam    traMADol    Surgical procedures (if appropriate):

## 2018-04-30 NOTE — ANESTHESIA POSTPROCEDURE EVALUATION
Post-Op Assessment Note      CV Status:  Stable    Mental Status:  Alert and awake    Hydration Status:  Euvolemic    PONV Controlled:  Controlled    Airway Patency:  Patent    Post Op Vitals Reviewed: Yes          Staff: Anesthesiologist, CRNA           BP   105/58   Temp      Pulse  70   Resp   14   SpO2   100

## 2018-04-30 NOTE — ANESTHESIA PREPROCEDURE EVALUATION
Review of Systems/Medical History          Cardiovascular  Pacemaker/AICD, Hyperlipidemia, Hypertension , Valvular heart disease , aortic valve stenosis, CAD , History of CABG, PVD,   Comment: Sick sinus syndrome status post pacemaker, chronic diastolic heart failure, 2v CABG and mitral valve repair in September 2007, re-do sternotomy and mitral valve replacement in January 2008, since status post stent to SVG, EF 45%, MINE 0 5cm2 per cardiology notes,  Pulmonary  Smoker ( 15 pk-yr, quit 2 years ago) ex-smoker  ,        GI/Hepatic         Comment: Status post renal artery stenting     Endo/Other  Diabetes type 2 Diet controlled,      GYN      Comment: Hx of cervical and uterine cancer     Hematology      Comment: Hodgkin's lymphoma in remission Musculoskeletal       Neurology   Psychology           Physical Exam    Airway    Mallampati score: II  TM Distance: >3 FB  Neck ROM: full     Dental   Comment: Edentulous,     Cardiovascular  Rhythm: regular, Murmur ( systolic ejection murmur),     Pulmonary  Breath sounds clear to auscultation,     Other Findings        Anesthesia Plan  ASA Score- 4     Anesthesia Type- IV sedation with anesthesia with ASA Monitors  Additional Monitors:   Airway Plan:         Plan Factors-  Patient did not smoke on day of surgery  Induction- intravenous  Postoperative Plan-     Informed Consent- Anesthetic plan and risks discussed with patient

## 2018-04-30 NOTE — PROGRESS NOTES
04/30/18 1800   Spiritual Beliefs/Perceptions   Support Systems Spouse/significant other   Stress Factors   Patient Stress Factors None identified   Coping Responses   Patient Coping Accepting   Plan of Care   Comments Pt  asked for Bible she left her Bible at home  Provided presence and pryer      Assessment Completed by: Unit visit

## 2018-04-30 NOTE — CONSULTS
Consultation - Cardiology Team One  Aiyana Painter 81 y o  female MRN: [id number]  Unit/Bed#: [facility] 425-01 Encounter: [id number]    Inpatient consult to Cardiology  Consult performed by: Carmen Dudley ordered by: Ruth Guillen          Physician Requesting Consult: Abigail Varela MD  Reason for Consult / Principal Problem: cardiac management aortic stenosis    History of Present Illness   HPI: Aiyana Painter is a 81y o  year old female who has a history of ICM ( EF 45%), prior PPM 2008 (SSS), chronic sys HF,  HTN, DM2, HL, mitral valve disease and severe symptomatic aortic stenosis who was transferred from [facility] under the advisement of her cardiologist Dr Joe Vora for surgical evaluation of her aortic valve disease  She also has had renal artery stenting, Hodgkins lymphoma in remission, and peripheral arterial disease of the RLE (details unknown)  Records were reviewed in the Media section in epic    She underwent CABG x 2 ( LIMA-LAD; L Cx) and mitral valve repair with a St Victor Manuel ring in September 2007 by Dr Guillaume Spear  Due to progressive mitral regurgitation, she underwent a redo sternotomy and mitral valve replacement with a 25 mm mechanical St Victor Manuel valve Jan 29, 2008  Cardiac catheterization March 16 demonstrated patent prior grafts and a 100% ostial DI lesion and a 75% proximal ramus lesion  She was transferred from [facility] to [facility], and underwent a stent of the SVG to the LAD  A transthoracic echocardiogram showed moderate to severe aortic stenosis with an MINE of 0 5 cm2, and a mean gradient of 28 3  She was readmitted Apr 21 to [facility] with CP and SOB  A repeat echo confirmed moderate AS and a mean gradient of 30mmHg  She has had recurrent near syncope  She was subsequently transferred to [facility] for candidacy for a TAVR  A CTA of the chest/abd /pelvis ,a 3DTEE and PFTs have been ordered by CTS        Transthoracic echocardiogram 4/22/18-  outside hospital  EF 45%  Hypokinesis basal inferior wall; hypokinesis basal-mid inferior wall  Moderate ; moderate AS  Normal prosthetic mitral valve funtion      SH- former smoker, quitting 2 yrs ago after a 15 pk yr hx  FH- positive for premature CAD    Review of Systems   Constitution: Negative for chills, fever, weakness and malaise/fatigue  Cardiovascular: Positive for near-syncope  Negative for chest pain, claudication, cyanosis, dyspnea on exertion, irregular heartbeat, leg swelling, orthopnea, palpitations, paroxysmal nocturnal dyspnea and syncope  Respiratory: Negative for cough and shortness of breath  Gastrointestinal: Negative for anorexia, nausea and vomiting  Neurological: Negative for dizziness, focal weakness, headaches and light-headedness  Historical Information   Past Medical History:   Diagnosis Date    Aortic valvar stenosis     CAD (coronary artery disease) of bypass graft     [facility]est[facility]) [facility]     DM2 (diabetes mellitus, type 2) ([facility])     H/O Hodgkin's lymphoma     History of cervical cancer     History of ischemic cardiomyopathy     History of uterine cancer     HLD (hyperlipidemia)     HTN (hypertension)     Mitral regurgitation     PAD (peripheral artery disease) ([facility])     Renal artery stenosis ([facility])     s/p renal artery stent    SSS (sick sinus syndrome) ([facility])     s/p ppm     Past Surgical History:   Procedure Laterality Date    APPENDECTOMY      CARDIAC PACEMAKER PLACEMENT      CARDIAC SURGERY  09/2007    MV Repair, CABG x 2 by Dr Brianda Espinosa @ [address] [place] [address]  01/2008    Redo Sternotomy, MVR #25mm St  Victor Manuel Mechanical    CHOLECYSTECTOMY      COLECTOMY      RENAL ARTERY STENT      TONSILLECTOMY       History   Alcohol Use No     History   Drug Use No     History   Smoking Status    Former Smoker    Years: 15 00    Types: Cigarettes    Quit date: 2016   Smokeless Tobacco    Never Used     Family History: History reviewed   No pertinent family history  Meds/Allergies   all current active meds have been reviewed, current meds:   Current Facility-Administered Medications   Medication Dose Route Frequency    acetaminophen (TYLENOL) tablet 650 mg  650 mg Oral Q6H PRN    aspirin chewable tablet 81 mg  81 mg Oral Daily    cholecalciferol (VITAMIN D3) tablet 2,000 Units  2,000 Units Oral Daily    clopidogrel (PLAVIX) tablet 75 mg  75 mg Oral Daily    diphenoxylate-atropine (LOMOTIL) 2 5-0 025 mg per tablet 1 tablet  1 tablet Oral 4x Daily PRN    furosemide (LASIX) tablet 40 mg  40 mg Oral Daily    gabapentin (NEURONTIN) capsule 100 mg  100 mg Oral HS    heparin (porcine) 25,000 units in 250 mL infusion (premix)  3-20 Units/kg/hr (Order-Specific) Intravenous Titrated    isosorbide dinitrate (ISORDIL) tablet 5 mg  5 mg Oral Q8H [address]    loratadine (CLARITIN) tablet 10 mg  10 mg Oral Daily    meclizine (ANTIVERT) tablet 12 5 mg  12 5 mg Oral Daily    pantoprazole (PROTONIX) EC tablet 40 mg  40 mg Oral Daily    pravastatin (PRAVACHOL) tablet 40 mg  40 mg Oral HS    psyllium (METAMUCIL) 1 packet  1 packet Oral TID    sacubitril-valsartan (ENTRESTO) 24-26 MG per tablet 1 tablet  1 tablet Oral BID    temazepam (RESTORIL) capsule 15 mg  15 mg Oral HS PRN    traMADol (ULTRAM) tablet 50 mg  50 mg Oral Q6H PRN    and PTA meds:   Prior to Admission Medications   Prescriptions Last Dose Informant Patient Reported? Taking?    amLODIPine (NORVASC) 2 5 mg tablet 4/28/2018 at Unknown time  Yes Yes   Sig: Take 2 5 mg by mouth daily   aspirin 81 mg chewable tablet 4/28/2018 at Unknown time  Yes Yes   Sig: Chew 81 mg daily   calcium polycarbophil (FIBERCON) 625 mg tablet Unknown at Unknown time  Yes No   Sig: Take 625 mg by mouth 3 (three) times a day   cetirizine (ZyrTEC) 10 mg tablet Unknown at Unknown time  Yes No   Sig: Take 10 mg by mouth daily   cholecalciferol (VITAMIN D3) 1,000 units tablet 4/28/2018 at Unknown time  Yes Yes   Sig: Take 2,000 Units by mouth daily   clopidogrel (PLAVIX) 75 mg tablet 4/28/2018 at Unknown time  Yes Yes   Sig: Take 75 mg by mouth daily   diltiazem (CARDIZEM) 120 MG tablet 4/28/2018 at Unknown time  Yes Yes   Sig: Take 120 mg by mouth 4 (four) times a day   diphenoxylate-atropine (LOMOTIL) 2 5-0 025 mg per tablet 4/28/2018 at Unknown time  Yes Yes   Sig: Take 1 tablet by mouth 4 (four) times a day as needed   furosemide (LASIX) 40 mg tablet 4/28/2018 at Unknown time  Yes Yes   Sig: Take 40 mg by mouth daily   gabapentin (NEURONTIN) 100 mg capsule 4/28/2018 at Unknown time  Yes Yes   Sig: Take 100 mg by mouth daily at bedtime   isosorbide dinitrate (ISORDIL) 5 mg tablet 4/28/2018 at Unknown time  Yes Yes   Sig: Take 5 mg by mouth every 8 (eight) hours   lisinopril (ZESTRIL) 5 mg tablet 4/28/2018 at Unknown time  Yes Yes   Sig: Take 2 5 mg by mouth daily   loperamide (IMODIUM) 2 mg capsule 4/28/2018 at Unknown time  Yes Yes   Sig: Take 4 mg by mouth 4 (four) times a day as needed for diarrhea   meclizine (ANTIVERT) 32 MG tablet 4/28/2018 at Unknown time  Yes Yes   Sig: Take 12 5 mg by mouth daily   meloxicam (MOBIC) 7 5 mg tablet 4/28/2018 at Unknown time  Yes Yes   Sig: Take 7 5 mg by mouth 2 (two) times a day as needed   pantoprazole (PROTONIX) 40 mg tablet 4/28/2018 at Unknown time  Yes Yes   Sig: Take 40 mg by mouth daily   pravastatin (PRAVACHOL) 20 mg tablet 4/28/2018 at Unknown time  Yes Yes   Sig: Take 40 mg by mouth daily at bedtime      Facility-Administered Medications: None       heparin (porcine) 3-20 Units/kg/hr (Order-Specific) Last Rate: 16 Units/kg/hr (04/29/18 1658)       Allergies   Allergen Reactions    Contrast [Iodinated Diagnostic Agents] Throat Swelling    Penicillins Throat Swelling    Ranolazine Vomiting     ranexa    Sulfa Antibiotics Throat Swelling       Objective   Vitals: Blood pressure 136/62, pulse 70, temperature 98 2 °F (36 8 °C), temperature source Oral, resp   rate 20, height 4' 11" (1 499 m), weight 52 8 kg (116 lb 6 5 oz), SpO2 96 %  ,     Body mass index is 23 51 kg/m²  ,     Systolic (86HLJ), QHQ:675 , Min:84 , DJX:704     Diastolic (98BZH), ECF:62, Min:42, Max:62            Intake/Output Summary (Last 24 hours) at 04/30/18 0848  Last data filed at 04/30/18 0502   Gross per 24 hour   Intake            995 5 ml   Output             1700 ml   Net           -704 5 ml     Weight (last 2 days)     Date/Time   Weight    04/30/18 0600  52 8 (116 4)    04/28/18 2300  53 5 (117 95)            Invasive Devices     Peripheral Intravenous Line            Peripheral IV 04/29/18 Right;Upper Antecubital 1 day                  Physical Exam   Constitutional: She is oriented to person, place, and time  No distress  HENT:   Head: Normocephalic and atraumatic  Eyes: Conjunctivae and EOM are normal    Neck: Normal range of motion  Neck supple  Cardiovascular: Normal rate, regular rhythm, S1 normal, intact distal pulses and normal pulses  Murmur heard  Crescendo systolic murmur is present with a grade of 5/6   No S2 appreciated  + prostethic mitral valve sounds   Pulmonary/Chest: Effort normal and breath sounds normal    Abdominal: Soft  Bowel sounds are normal    Musculoskeletal: Normal range of motion  Neurological: She is alert and oriented to person, place, and time  Skin: Skin is warm and dry  She is not diaphoretic  Psychiatric: She has a normal mood and affect  Nursing note and vitals reviewed          LABORATORY RESULTS:      CBC with diff:   Results from last 7 days  Lab Units 04/29/18  0820   WBC Thousand/uL 5 30   HEMOGLOBIN g/dL 10 4*   HEMATOCRIT % 32 8*   MCV fL 93   PLATELETS Thousands/uL 251   MCH pg 29 6   MCHC g/dL 31 7   RDW % 14 4   MPV fL 11 9       CMP:  Results from last 7 days  Lab Units 04/30/18  0456   SODIUM mmol/L 138   POTASSIUM mmol/L 4 4   CHLORIDE mmol/L 101   CO2 mmol/L 31   ANION GAP mmol/L 6   BUN mg/dL 17   CREATININE mg/dL 1 22   GLUCOSE RANDOM mg/dL 90   CALCIUM mg/dL 9 1 AST U/L 29   ALT U/L 26   ALK PHOS U/L 75   TOTAL PROTEIN g/dL 7 0   BILIRUBIN TOTAL mg/dL 0 28   EGFR ml/min/1 73sq m 45       BMP:  Results from last 7 days  Lab Units 04/30/18  0456   SODIUM mmol/L 138   POTASSIUM mmol/L 4 4   CHLORIDE mmol/L 101   CO2 mmol/L 31   BUN mg/dL 17   CREATININE mg/dL 1 22   GLUCOSE RANDOM mg/dL 90   CALCIUM mg/dL 9 1          Lab Results   Component Value Date    NTBNP 882 (H) 04/29/2018                   Results from last 7 days  Lab Units 04/29/18  0820   HEMOGLOBIN A1C % 6 0                Results from last 7 days  Lab Units 04/30/18  0456 04/29/18  0820   INR  1 08 1 07     Lipid Profile:   Lab Results   Component Value Date    CHOL 106 04/29/2018     Lab Results   Component Value Date    HDL 54 04/29/2018     Lab Results   Component Value Date    LDLCALC 33 04/29/2018     Lab Results   Component Value Date    TRIG 94 04/29/2018         Imaging: I have personally reviewed pertinent reports  and I have personally reviewed pertinent films in PACS  No results found  EKG reviewed personally:   One will be ordered    Telemetry  A paced 68      Assessment  Principal Problem: Aortic valvar stenosis  Active Problems:    CAD (coronary artery disease) of bypass graft    Chronic CHF (congestive heart failure) ([facility])    DM2 (diabetes mellitus, type 2) ([facility])    HTN (hypertension)    HLD (hyperlipidemia)    History of ischemic cardiomyopathy    Mitral regurgitation      Assessment/ Plan  Severe symptomatic aortic stenosis, MINE 0 5 cm2; mean gradient 30mm Hg (TTE 4/18)  · Testing for TAVR is underway  ICM, EF 45%  Hx mitral regurgitation, S/p mechanical Mitral valve-   · on IV heparin bridge/ coumadin  INR goal 2 5-3 5  · INR 1 08 today  CAD, S/P CABG x 2 ( LIMA-->LAD, SVG-->LCx),Stent SVG-->LAD Apr 3,2018  · On ASA 81 mg/d,Plavix 75 mg/d, isordil 5 mg q8h, statin  chronic systolic HF  · On lasix 40 mg po/d, entresto 24/26mg  SSS,s/p SJ  PPM  HTN  /52  HL-on pravastatin 40 mg/d  LDL 33  Non HDL 52  DM2  HgAic 6 0  Hx renal artery stenting  PAD  LEO  baseline cr 0 9  Is 1 2 today         Thank you for allowing us to participate in this patient's care  This pt will follow up with Dr Joselyn Kerr once discharged  Counseling / Coordination of Care  Total floor / unit time spent today 40 minutes  Greater than 50% of total time was spent with the patient and / or family counseling and / or coordination of care  A description of the counseling / coordination of care: Review of history, current assessment, development of a plan  Code Status: Level 1 - Full Code    ** Please Note: Dragon 360 Dictation voice to text software may have been used in the creation of this document   **

## 2018-05-01 ENCOUNTER — APPOINTMENT (INPATIENT)
Dept: RADIOLOGY | Facility: HOSPITAL | Age: 73
DRG: 307 | End: 2018-05-01
Payer: MEDICARE

## 2018-05-01 LAB
APTT PPP: 71 SECONDS (ref 23–35)
INR PPP: 1.09 (ref 0.86–1.16)
MRSA NOSE QL CULT: ABNORMAL
MRSA NOSE QL CULT: ABNORMAL
PROTHROMBIN TIME: 14.1 SECONDS (ref 12.1–14.4)

## 2018-05-01 PROCEDURE — 74174 CTA ABD&PLVS W/CONTRAST: CPT

## 2018-05-01 PROCEDURE — 75572 CT HRT W/3D IMAGE: CPT

## 2018-05-01 PROCEDURE — 99231 SBSQ HOSP IP/OBS SF/LOW 25: CPT | Performed by: PHYSICIAN ASSISTANT

## 2018-05-01 PROCEDURE — 85610 PROTHROMBIN TIME: CPT | Performed by: PHYSICIAN ASSISTANT

## 2018-05-01 PROCEDURE — 85730 THROMBOPLASTIN TIME PARTIAL: CPT | Performed by: INTERNAL MEDICINE

## 2018-05-01 RX ORDER — WARFARIN SODIUM 1 MG/1
2 TABLET ORAL
Status: COMPLETED | OUTPATIENT
Start: 2018-05-01 | End: 2018-05-01

## 2018-05-01 RX ADMIN — DIPHENHYDRAMINE HCL 50 MG: 25 TABLET ORAL at 08:16

## 2018-05-01 RX ADMIN — MECLIZINE HCL 12.5 MG 12.5 MG: 12.5 TABLET ORAL at 10:11

## 2018-05-01 RX ADMIN — GABAPENTIN 100 MG: 100 CAPSULE ORAL at 21:18

## 2018-05-01 RX ADMIN — TRAMADOL HYDROCHLORIDE 50 MG: 50 TABLET, FILM COATED ORAL at 21:18

## 2018-05-01 RX ADMIN — FUROSEMIDE 40 MG: 40 TABLET ORAL at 10:14

## 2018-05-01 RX ADMIN — IODIXANOL 120 ML: 320 INJECTION, SOLUTION INTRAVASCULAR at 10:05

## 2018-05-01 RX ADMIN — HEPARIN SODIUM 16 UNITS/KG/HR: 10000 INJECTION, SOLUTION INTRAVENOUS at 17:40

## 2018-05-01 RX ADMIN — PANTOPRAZOLE SODIUM 40 MG: 40 TABLET, DELAYED RELEASE ORAL at 10:15

## 2018-05-01 RX ADMIN — CLOPIDOGREL BISULFATE 75 MG: 75 TABLET ORAL at 10:13

## 2018-05-01 RX ADMIN — SACUBITRIL AND VALSARTAN 1 TABLET: 24; 26 TABLET, FILM COATED ORAL at 17:53

## 2018-05-01 RX ADMIN — VITAMIN D, TAB 1000IU (100/BT) 2000 UNITS: 25 TAB at 10:12

## 2018-05-01 RX ADMIN — WARFARIN SODIUM 2 MG: 1 TABLET ORAL at 17:53

## 2018-05-01 RX ADMIN — PSYLLIUM HUSK 1 PACKET: 3.4 POWDER ORAL at 10:38

## 2018-05-01 RX ADMIN — TRAMADOL HYDROCHLORIDE 50 MG: 50 TABLET, FILM COATED ORAL at 10:48

## 2018-05-01 RX ADMIN — ISOSORBIDE DINITRATE 5 MG: 10 TABLET ORAL at 22:16

## 2018-05-01 RX ADMIN — SACUBITRIL AND VALSARTAN 1 TABLET: 24; 26 TABLET, FILM COATED ORAL at 10:37

## 2018-05-01 RX ADMIN — PRAVASTATIN SODIUM 40 MG: 40 TABLET ORAL at 21:18

## 2018-05-01 RX ADMIN — LORATADINE 10 MG: 10 TABLET ORAL at 10:14

## 2018-05-01 RX ADMIN — ISOSORBIDE DINITRATE 5 MG: 10 TABLET ORAL at 16:29

## 2018-05-01 RX ADMIN — ASPIRIN 81 MG 81 MG: 81 TABLET ORAL at 10:13

## 2018-05-01 RX ADMIN — METHYLPREDNISOLONE 32 MG: 16 TABLET ORAL at 06:13

## 2018-05-01 RX ADMIN — ISOSORBIDE DINITRATE 5 MG: 10 TABLET ORAL at 06:13

## 2018-05-01 NOTE — PROGRESS NOTES
Progress Note - Cardiothoracic Surgery   Magnus Godinez 81 y o  female MRN: [id number]  Unit/Bed#: [facility] 425-01 Encounter: [id number]      24 Hour Events: Resting comfortably  No events overnight  LORA completed  For CTA today       Medications:   Scheduled Meds:  Current Facility-Administered Medications:  acetaminophen 650 mg Oral Q6H PRN [place], PA-C    aspirin 81 mg Oral Daily [place], PA-C    cholecalciferol 2,000 Units Oral Daily [place], PA-C    clopidogrel 75 mg Oral Daily [place], PA-C    diphenhydrAMINE 50 mg Oral Once Rosaleen Badder, PA-C    diphenoxylate-atropine 1 tablet Oral 4x Daily PRN [place], PA-C    furosemide 40 mg Oral Daily [place], PA-C    gabapentin 100 mg Oral HS [place], PA-C    heparin (porcine) 3-20 Units/kg/hr (Order-Specific) Intravenous Titrated [place], PA-C Last Rate: 16 Units/kg/hr (04/30/18 0954)   isosorbide dinitrate 5 mg Oral Q8H [address] Harriet Estelle, PA-C    loratadine 10 mg Oral Daily [place], PA-C    meclizine 12 5 mg Oral Daily [place], PA-C    pantoprazole 40 mg Oral Daily [place], PA-C    pravastatin 40 mg Oral HS [place], PA-C    psyllium 1 packet Oral TID [place], PA-C    sacubitril-valsartan 1 tablet Oral BID Harriet Estelle, PA-C    temazepam 15 mg Oral HS PRN [place], PA-C    traMADol 50 mg Oral Q6H PRN [place], PA-C      Continuous Infusions:  heparin (porcine) 3-20 Units/kg/hr (Order-Specific) Last Rate: 16 Units/kg/hr (04/30/18 0954)       Results:     Results from last 7 days  Lab Units 04/29/18  0820   WBC Thousand/uL 5 30   HEMOGLOBIN g/dL 10 4*   HEMATOCRIT % 32 8*   PLATELETS Thousands/uL 251       Results from last 7 days  Lab Units 04/30/18  0456   SODIUM mmol/L 138   POTASSIUM mmol/L 4 4   CHLORIDE mmol/L 101   CO2 mmol/L 31   BUN mg/dL 17   CREATININE mg/dL 1 22   GLUCOSE RANDOM mg/dL 90   CALCIUM mg/dL 9 1       Results from last 7 days  Lab Units 05/01/18  0525 04/30/18  0456 04/29/18  2326  04/29/18  0820   INR  1 09 1 08  --   --  1 07   PTT seconds 71* 60* 75*  < > 32   < > = values in this interval not displayed  Coumadin mg 2 2 2          Studies:     Echocardiogram: EF 45%; Well seated mechanical mitral valve  Trileaflet AV with severe AS and Mild to moderate AI  Carotid artery duplex: 50-69 % Bilateral carotid stenosis with antegrade vertebral flow     CTA: ordered for today    Vitals:   Vitals:    04/30/18 2346 05/01/18 0321 05/01/18 0600 05/01/18 0733   BP: 110/53 96/50  118/58   BP Location: Left arm Left arm  Right arm   Pulse: 70 72  70   Resp: 18 18 18   Temp: 98 3 °F (36 8 °C) 98 3 °F (36 8 °C)  98 °F (36 7 °C)   TempSrc: Oral Oral  Oral   SpO2: 94% 90%  94%   Weight:   52 8 kg (116 lb 6 5 oz)    Height:           Physical Exam:  HEENT/NECK:  PERRLA  No jugular venous distention  Cardiac: Regular rate and rhythm  Pulmonary:  Breath clear bilaterally  Abdomen:  Non-tender, Non-distended  Positive bowel sounds  Lower extremities: Extremities warm/dry  Radial/PT/DP pulses 2+ bilaterally  Trace edema B/L  Neuro: Alert and oriented X 3  Sensation is grossly intact  No focal deficits  Skin: Warm/Dry, without rashes or lesions  Assessment:  Patient Active Problem List   Diagnosis    Aortic valvar stenosis    CAD (coronary artery disease) of bypass graft    [facility] )    DM2 (diabetes mellitus, type 2) ([facility] )    HTN (hypertension)    HLD (hyperlipidemia)    History of ischemic cardiomyopathy    Mitral regurgitation     Severe aortic stenosis;  Ongoing TAVR workup    Plan:  Patient agreeable to proceed with surgery;   LORA completed  CTA ordered for today  Carotids completed  Continue heparin; Coumadin 2 mg dosed  Case discussed with KYE Bosch: Audie Ware PA-C  DATE: May 1, 2018  TIME: 7:59 AM

## 2018-05-02 LAB
APTT PPP: 86 SECONDS (ref 23–35)
INR PPP: 1.43 (ref 0.86–1.16)
PROTHROMBIN TIME: 17.5 SECONDS (ref 12.1–14.4)

## 2018-05-02 PROCEDURE — 99231 SBSQ HOSP IP/OBS SF/LOW 25: CPT | Performed by: PHYSICIAN ASSISTANT

## 2018-05-02 PROCEDURE — 85610 PROTHROMBIN TIME: CPT | Performed by: PHYSICIAN ASSISTANT

## 2018-05-02 PROCEDURE — 99232 SBSQ HOSP IP/OBS MODERATE 35: CPT | Performed by: INTERNAL MEDICINE

## 2018-05-02 PROCEDURE — 85730 THROMBOPLASTIN TIME PARTIAL: CPT | Performed by: THORACIC SURGERY (CARDIOTHORACIC VASCULAR SURGERY)

## 2018-05-02 RX ORDER — WARFARIN SODIUM 5 MG/1
5 TABLET ORAL
Status: COMPLETED | OUTPATIENT
Start: 2018-05-02 | End: 2018-05-02

## 2018-05-02 RX ADMIN — FUROSEMIDE 40 MG: 40 TABLET ORAL at 09:35

## 2018-05-02 RX ADMIN — PRAVASTATIN SODIUM 40 MG: 40 TABLET ORAL at 23:01

## 2018-05-02 RX ADMIN — GABAPENTIN 100 MG: 100 CAPSULE ORAL at 23:02

## 2018-05-02 RX ADMIN — VITAMIN D, TAB 1000IU (100/BT) 2000 UNITS: 25 TAB at 09:35

## 2018-05-02 RX ADMIN — TRAMADOL HYDROCHLORIDE 50 MG: 50 TABLET, FILM COATED ORAL at 15:02

## 2018-05-02 RX ADMIN — CLOPIDOGREL BISULFATE 75 MG: 75 TABLET ORAL at 09:35

## 2018-05-02 RX ADMIN — MECLIZINE HCL 12.5 MG 12.5 MG: 12.5 TABLET ORAL at 09:35

## 2018-05-02 RX ADMIN — LORATADINE 10 MG: 10 TABLET ORAL at 09:35

## 2018-05-02 RX ADMIN — ASPIRIN 81 MG 81 MG: 81 TABLET ORAL at 09:35

## 2018-05-02 RX ADMIN — SACUBITRIL AND VALSARTAN 1 TABLET: 24; 26 TABLET, FILM COATED ORAL at 09:35

## 2018-05-02 RX ADMIN — PANTOPRAZOLE SODIUM 40 MG: 40 TABLET, DELAYED RELEASE ORAL at 09:35

## 2018-05-02 RX ADMIN — HEPARIN SODIUM 16 UNITS/KG/HR: 10000 INJECTION, SOLUTION INTRAVENOUS at 23:49

## 2018-05-02 RX ADMIN — WARFARIN SODIUM 5 MG: 5 TABLET ORAL at 17:29

## 2018-05-02 RX ADMIN — SACUBITRIL AND VALSARTAN 1 TABLET: 24; 26 TABLET, FILM COATED ORAL at 17:29

## 2018-05-02 NOTE — PROGRESS NOTES
General Cardiology   Progress Note -  Team One   Kathy Smith 81 y o  female MRN: [id number]    Unit/Bed#: [facility] 425-01 Encounter: [id number]      Subjective: Pt seen for follow up, no events overnight  Pt feels well this AM, she is still dyspneic on exertion  No chest pain or palpitations  She has been undergoing testing for TAVR  Review of Systems   Constitution: Negative for decreased appetite, fever, weakness and malaise/fatigue  Cardiovascular: Positive for dyspnea on exertion  Negative for chest pain, irregular heartbeat, leg swelling, orthopnea, palpitations and syncope  Respiratory: Negative for cough, sleep disturbances due to breathing and wheezing  Gastrointestinal: Negative for abdominal pain, nausea and vomiting  Genitourinary: Negative for dysuria  Neurological: Negative for dizziness and light-headedness  Psychiatric/Behavioral: Negative for altered mental status  All other systems reviewed and are negative  Objective:   Vitals: Blood pressure 95/50, pulse 70, temperature 98 °F (36 7 °C), temperature source Oral, resp  rate 20, height 4' 11" (1 499 m), weight 52 7 kg (116 lb 2 9 oz), SpO2 92 %  ,     Body mass index is 23 47 kg/m²  ,     Systolic (91STJ), UKS:368 , Min:94 , YLA:835     Diastolic (66BUJ), KHE:68, Min:46, Max:52      Intake/Output Summary (Last 24 hours) at 05/02/18 0958  Last data filed at 05/02/18 0630   Gross per 24 hour   Intake             1216 ml   Output              950 ml   Net              266 ml     Weight (last 2 days)     Date/Time   Weight    05/02/18 0600  52 7 (116 18)    05/01/18 0600  52 8 (116 4)    04/30/18 0600  52 8 (116 4)            Telemetry Review: No significant arrhythmias seen on telemetry review  Sinus rhythm; atrial pacing, no significant events    Physical Exam   Constitutional: She is oriented to person, place, and time  No distress     Pt sitting up in chair in NAD, alert, pleasant and cooperative   HENT:   Head: Normocephalic and atraumatic  Neck: No JVD present  Cardiovascular: Normal rate, regular rhythm and S1 normal     Murmur heard  Systolic murmur is present with a grade of 2/6   Systolic murmur of AS  No LE edema   Pulmonary/Chest: Effort normal and breath sounds normal  No respiratory distress  She has no wheezes  She has no rales  Abdominal: Soft  Musculoskeletal: She exhibits no edema  Neurological: She is alert and oriented to person, place, and time  Skin: Skin is warm and dry  She is not diaphoretic  Psychiatric: She has a normal mood and affect  Her behavior is normal    Nursing note and vitals reviewed      LABORATORY RESULTS      CBC with diff:   Results from last 7 days  Lab Units 04/29/18  0820   WBC Thousand/uL 5 30   HEMOGLOBIN g/dL 10 4*   HEMATOCRIT % 32 8*   MCV fL 93   PLATELETS Thousands/uL 251   MCH pg 29 6   MCHC g/dL 31 7   RDW % 14 4   MPV fL 11 9     CMP:  Results from last 7 days  Lab Units 04/30/18  0456   SODIUM mmol/L 138   POTASSIUM mmol/L 4 4   CHLORIDE mmol/L 101   CO2 mmol/L 31   ANION GAP mmol/L 6   BUN mg/dL 17   CREATININE mg/dL 1 22   GLUCOSE RANDOM mg/dL 90   CALCIUM mg/dL 9 1   AST U/L 29   ALT U/L 26   ALK PHOS U/L 75   TOTAL PROTEIN g/dL 7 0   BILIRUBIN TOTAL mg/dL 0 28   EGFR ml/min/1 73sq m 45     BMP:  Results from last 7 days  Lab Units 04/30/18  0456   SODIUM mmol/L 138   POTASSIUM mmol/L 4 4   CHLORIDE mmol/L 101   CO2 mmol/L 31   BUN mg/dL 17   CREATININE mg/dL 1 22   GLUCOSE RANDOM mg/dL 90   CALCIUM mg/dL 9 1     Lab Results   Component Value Date    NTBNP 882 (H) 04/29/2018      Results from last 7 days  Lab Units 04/29/18  0820   HEMOGLOBIN A1C % 6 0       Results from last 7 days  Lab Units 05/02/18  0514 05/01/18  0525 04/30/18  0456 04/29/18  0820   INR  1 43* 1 09 1 08 1 07     Lipid Profile:   Lab Results   Component Value Date    CHOL 106 04/29/2018     Lab Results   Component Value Date    HDL 54 04/29/2018     Lab Results   Component Value Date    LDLCALC 33 04/29/2018     Lab Results   Component Value Date    TRIG 94 04/29/2018     Meds/Allergies   current meds:   Current Facility-Administered Medications   Medication Dose Route Frequency    acetaminophen (TYLENOL) tablet 650 mg  650 mg Oral Q6H PRN    aspirin chewable tablet 81 mg  81 mg Oral Daily    cholecalciferol (VITAMIN D3) tablet 2,000 Units  2,000 Units Oral Daily    clopidogrel (PLAVIX) tablet 75 mg  75 mg Oral Daily    diphenoxylate-atropine (LOMOTIL) 2 5-0 025 mg per tablet 1 tablet  1 tablet Oral 4x Daily PRN    furosemide (LASIX) tablet 40 mg  40 mg Oral Daily    gabapentin (NEURONTIN) capsule 100 mg  100 mg Oral HS    heparin (porcine) 25,000 units in 250 mL infusion (premix)  3-20 Units/kg/hr (Order-Specific) Intravenous Titrated    isosorbide dinitrate (ISORDIL) tablet 5 mg  5 mg Oral Q8H [address]    loratadine (CLARITIN) tablet 10 mg  10 mg Oral Daily    meclizine (ANTIVERT) tablet 12 5 mg  12 5 mg Oral Daily    pantoprazole (PROTONIX) EC tablet 40 mg  40 mg Oral Daily    pravastatin (PRAVACHOL) tablet 40 mg  40 mg Oral HS    psyllium (METAMUCIL) 1 packet  1 packet Oral TID    sacubitril-valsartan (ENTRESTO) 24-26 MG per tablet 1 tablet  1 tablet Oral BID    temazepam (RESTORIL) capsule 15 mg  15 mg Oral HS PRN    traMADol (ULTRAM) tablet 50 mg  50 mg Oral Q6H PRN    warfarin (COUMADIN) tablet 5 mg  5 mg Oral Once (warfarin)     Prescriptions Prior to Admission   Medication    amLODIPine (NORVASC) 2 5 mg tablet    aspirin 81 mg chewable tablet    cholecalciferol (VITAMIN D3) 1,000 units tablet    clopidogrel (PLAVIX) 75 mg tablet    diltiazem (CARDIZEM) 120 MG tablet    diphenoxylate-atropine (LOMOTIL) 2 5-0 025 mg per tablet    furosemide (LASIX) 40 mg tablet    gabapentin (NEURONTIN) 100 mg capsule    isosorbide dinitrate (ISORDIL) 5 mg tablet    lisinopril (ZESTRIL) 5 mg tablet    loperamide (IMODIUM) 2 mg capsule    meclizine (ANTIVERT) 32 MG tablet    meloxicam (MOBIC) 7 5 mg tablet    pantoprazole (PROTONIX) 40 mg tablet    pravastatin (PRAVACHOL) 20 mg tablet    calcium polycarbophil (FIBERCON) 625 mg tablet    cetirizine (ZyrTEC) 10 mg tablet         heparin (porcine) 3-20 Units/kg/hr (Order-Specific) Last Rate: 16 Units/kg/hr (05/01/18 1740)     Assessment:  Principal Problem: Aortic valvar stenosis  Active Problems:    CAD (coronary artery disease) of bypass graft    Chronic CHF (congestive heart failure) ([facility])    DM2 (diabetes mellitus, type 2) ([facility])    HTN (hypertension)    HLD (hyperlipidemia)    History of ischemic cardiomyopathy    Mitral regurgitation      Assessment/ Plan:    Severe symptomatic aortic stenosis: MINE 0 5cm  Undergoing workup and testing for TAVR  OP follow up with CT surgery on 5/16  Pt still dyspneic with exertion but acceptable to patient; no evidence of acute CHF    CAD: with prior bypass and more recent stenting of LAD graft on 4/2/18  No anginal symptoms  On ASA/plavix, statin and isordil    Hx of mechanical MVR: on warfarin chronically  Currently on heparin bridge; INR 1 4 today    Chronic systolic CHF:   She appears well compensated on exam  Continue daily lasix and entresto  HTN: BP control adequate    Dyslipidemia: on statin    Counseling / Coordination of Care  Total floor / unit time spent today 30 minutes  Greater than 50% of total time was spent with the patient and / or family counseling and / or coordination of care  ** Please Note: Dragon 360 Dictation voice to text software may have been used in the creation of this document   **

## 2018-05-02 NOTE — PROGRESS NOTES
Progress Note - Cardiothoracic Surgery   Nadine Fong 81 y o  female MRN: [id number]  Unit/Bed#: [facility] 425-01 Encounter: [id number]      24 Hour Events: Resting comfortably  No events overnight  CTA completed today       Medications:   Scheduled Meds:    Current Facility-Administered Medications:  acetaminophen 650 mg Oral Q6H PRN Norrine Ziebach, PA-C    aspirin 81 mg Oral Daily Norrine Lidia, PA-C    cholecalciferol 2,000 Units Oral Daily Norrine Lidia, PA-C    clopidogrel 75 mg Oral Daily Norrine Ziebach, [place]    diphenoxylate-atropine 1 tablet Oral 4x Daily PRN Norrine Lidia, PA-C    furosemide 40 mg Oral Daily Norrine Lidia, PA-C    gabapentin 100 mg Oral HS Norrine Ziebach, PA-C    heparin (porcine) 3-20 Units/kg/hr (Order-Specific) Intravenous Titrated Norrine Lidia, PA-C Last Rate: 16 Units/kg/hr (05/01/18 1740)   isosorbide dinitrate 5 mg Oral Q8H [address] Harriet Estelle, PA-C    loratadine 10 mg Oral Daily Norrine Lidia, PA-C    meclizine 12 5 mg Oral Daily Norrine Ziebach, PA-C    pantoprazole 40 mg Oral Daily Norrine Ziebach, PA-C    pravastatin 40 mg Oral HS Norrine Lidia, PA-C    psyllium 1 packet Oral TID Norrine Ziebach, PA-C    sacubitril-valsartan 1 tablet Oral BID Harriet Estelle, PA-C    temazepam 15 mg Oral HS PRN Norrine Lidia, PA-C    traMADol 50 mg Oral Q6H PRN Norrine Lidia, PA-C      Continuous Infusions:    heparin (porcine) 3-20 Units/kg/hr (Order-Specific) Last Rate: 16 Units/kg/hr (05/01/18 1740)       Results:     Results from last 7 days  Lab Units 04/29/18  0820   WBC Thousand/uL 5 30   HEMOGLOBIN g/dL 10 4*   HEMATOCRIT % 32 8*   PLATELETS Thousands/uL 251       Results from last 7 days  Lab Units 04/30/18  0456   SODIUM mmol/L 138   POTASSIUM mmol/L 4 4   CHLORIDE mmol/L 101   CO2 mmol/L 31   BUN mg/dL 17   CREATININE mg/dL 1 22   GLUCOSE RANDOM mg/dL 90   CALCIUM mg/dL 9 1       Results from last 7 days  Lab Units 05/02/18  4995 05/02/18  0514 05/01/18  1638 04/30/18  0456   INR   --  1 43* 1 09 1 08   PTT seconds 86*  --  71* 60*     Coumadin mg 5 2 2 2         Studies:     Echocardiogram: EF 45%; Well seated mechanical mitral valve  Trileaflet AV with severe AS and Mild to moderate AI  Carotid artery duplex: 50-69 % Bilateral carotid stenosis with antegrade vertebral flow     CTA: Completed     Vitals:   Vitals:    05/02/18 0000 05/02/18 0344 05/02/18 0600 05/02/18 0700   BP: (!) 101/46 106/50  95/50   BP Location: Left arm Left arm  Left arm   Pulse: 70 70  70   Resp: 18 18  20   Temp: 97 5 °F (36 4 °C) 97 5 °F (36 4 °C)  98 °F (36 7 °C)   TempSrc: Oral Oral  Oral   SpO2: 92% 90%  92%   Weight:   52 7 kg (116 lb 2 9 oz)    Height:           Physical Exam:    HEENT/NECK:  PERRLA  No jugular venous distention  Cardiac: Regular rate and rhythm  No rubs/murmurs/gallops  Pulmonary:  Breath sounds slightly diminished at the bases bilaterally  Abdomen:  Non-tender, Non-distended  Positive bowel sounds  Lower extremities: Extremities warm/dry  Radial/PT/DP pulses 2+ bilaterally  No edema B/L  Neuro: Alert and oriented X 3  Sensation is grossly intact  No focal deficits  Skin: Warm/Dry, without rashes or lesions  Assessment:  Patient Active Problem List   Diagnosis    Aortic valvar stenosis    CAD (coronary artery disease) of bypass graft    Chronic [facility]F (congestive [facility] failure) ([facility] )    DM2 (diabetes mellitus, type 2) ([facility] )    HTN (hypertension)    HLD (hyperlipidemia)    History of ischemic cardiomyopathy    Mitral regurgitation     Severe aortic stenosis; Ongoing TAVR workup    Plan:  Patient agreeable to proceed with surgery;   INR 1 43; Coumadin, 5 mg today  Continue heparin until INR therpeutic    LORA completed  CTA completed today  Carotids completed  Continue heparin; Coumadin 2 mg dosed  Case discussed with Dr Marialuisa Whitley M BENJI Arias: Ernesto Donaldson PA-C  DATE: May 2, 2018  TIME: 9:10 AM

## 2018-05-03 PROBLEM — I35.0 AORTIC VALVE STENOSIS: Status: ACTIVE | Noted: 2018-05-03

## 2018-05-03 LAB
APTT PPP: 83 SECONDS (ref 23–35)
INR PPP: 1.71 (ref 0.86–1.16)
PROTHROMBIN TIME: 20.2 SECONDS (ref 12.1–14.4)

## 2018-05-03 PROCEDURE — 85610 PROTHROMBIN TIME: CPT | Performed by: PHYSICIAN ASSISTANT

## 2018-05-03 PROCEDURE — 99232 SBSQ HOSP IP/OBS MODERATE 35: CPT | Performed by: INTERNAL MEDICINE

## 2018-05-03 PROCEDURE — 99232 SBSQ HOSP IP/OBS MODERATE 35: CPT | Performed by: THORACIC SURGERY (CARDIOTHORACIC VASCULAR SURGERY)

## 2018-05-03 PROCEDURE — 85730 THROMBOPLASTIN TIME PARTIAL: CPT | Performed by: THORACIC SURGERY (CARDIOTHORACIC VASCULAR SURGERY)

## 2018-05-03 RX ORDER — WARFARIN SODIUM 5 MG/1
5 TABLET ORAL
Status: COMPLETED | OUTPATIENT
Start: 2018-05-03 | End: 2018-05-03

## 2018-05-03 RX ADMIN — MECLIZINE HCL 12.5 MG 12.5 MG: 12.5 TABLET ORAL at 09:06

## 2018-05-03 RX ADMIN — CLOPIDOGREL BISULFATE 75 MG: 75 TABLET ORAL at 09:06

## 2018-05-03 RX ADMIN — TRAMADOL HYDROCHLORIDE 50 MG: 50 TABLET, FILM COATED ORAL at 19:37

## 2018-05-03 RX ADMIN — PANTOPRAZOLE SODIUM 40 MG: 40 TABLET, DELAYED RELEASE ORAL at 09:06

## 2018-05-03 RX ADMIN — ASPIRIN 81 MG 81 MG: 81 TABLET ORAL at 09:05

## 2018-05-03 RX ADMIN — HEPARIN SODIUM 16 UNITS/KG/HR: 10000 INJECTION, SOLUTION INTRAVENOUS at 16:42

## 2018-05-03 RX ADMIN — FUROSEMIDE 40 MG: 40 TABLET ORAL at 09:06

## 2018-05-03 RX ADMIN — TRAMADOL HYDROCHLORIDE 50 MG: 50 TABLET, FILM COATED ORAL at 03:41

## 2018-05-03 RX ADMIN — LORATADINE 10 MG: 10 TABLET ORAL at 09:06

## 2018-05-03 RX ADMIN — VITAMIN D, TAB 1000IU (100/BT) 2000 UNITS: 25 TAB at 09:06

## 2018-05-03 RX ADMIN — SACUBITRIL AND VALSARTAN 1 TABLET: 24; 26 TABLET, FILM COATED ORAL at 09:06

## 2018-05-03 RX ADMIN — GABAPENTIN 100 MG: 100 CAPSULE ORAL at 22:24

## 2018-05-03 RX ADMIN — WARFARIN SODIUM 5 MG: 5 TABLET ORAL at 19:26

## 2018-05-03 RX ADMIN — PRAVASTATIN SODIUM 40 MG: 40 TABLET ORAL at 22:24

## 2018-05-03 NOTE — PROGRESS NOTES
Cardiology Progress Note - Carolina Mane 81 y o  female MRN: [id number]    Unit/Bed#: [facility] 425-01 Encounter: [id number]      Assessment     Severe symptomatic aortic stenosis  CAD with a history of CABG and intervention on her SVG to LAD, residual 75% lesion in her proximal ramus noted  Mechanical mitral valve  Ischemic cardiomyopathy, EF 26%  Chronic diastolic CHF unstable outpatient medical therapy and euvolemic  Sick sinus syndrome with a history of [facility]'s permanent pacemaker  History of renal artery stenting  Peripheral arterial disease  Mild acute kidney injury with creatinine 1 2     Plan:     Continue current meds  Continue heparin drip, INR 1 7, bridging to Coumadin due to mechanical mitral valve  TAVR w/u completed, will eventually have TAVR M a couple weeks      Subjective:   Patient seen and examined        She feels well, she would like to ambulate, she denies palpitations, shortness of breath or lightheadedness    Meds/Allergies   Allergies   Allergen Reactions    Contrast [Iodinated Diagnostic Agents] Throat Swelling    Penicillins Throat Swelling    Ranolazine Vomiting     ranexa    Sulfa Antibiotics Throat Swelling       Current Facility-Administered Medications:     acetaminophen (TYLENOL) tablet 650 mg, 650 mg, Oral, Q6H PRN, Inna Garcia PA-C, 650 mg at 04/29/18 1308    aspirin chewable tablet 81 mg, 81 mg, Oral, Daily, Inna Garcia PA-C, 81 mg at 05/03/18 3749    cholecalciferol (VITAMIN D3) tablet 2,000 Units, 2,000 Units, Oral, Daily, Inna Garcia PA-C, 2,000 Units at 05/03/18 0906    clopidogrel (PLAVIX) tablet 75 mg, 75 mg, Oral, Daily, Inna Garcia PA-C, 75 mg at 05/03/18 0906    diphenoxylate-atropine (LOMOTIL) 2 5-0 025 mg per tablet 1 tablet, 1 tablet, Oral, 4x Daily PRN, Inna Garcia PA-C    furosemide (LASIX) tablet 40 mg, 40 mg, Oral, Daily, Harriet Mccabe PA-C, 40 mg at 05/03/18 0906    gabapentin (NEURONTIN) capsule 100 mg, 100 mg, Oral, HS, Priyank Riley PA-C, 100 mg at 05/02/18 2302    heparin (porcine) 25,000 units in 250 mL infusion (premix), 3-20 Units/kg/hr (Order-Specific), Intravenous, Titrated, Priyank Riley PA-C, Last Rate: 8 mL/hr at 05/02/18 2349, 16 Units/kg/hr at 05/02/18 2349    isosorbide dinitrate (ISORDIL) tablet 5 mg, 5 mg, Oral, Q8H [address], Harriet Mccabe PA-C, 5 mg at 05/01/18 2216    loratadine (CLARITIN) tablet 10 mg, 10 mg, Oral, Daily, Priyank Riley PA-C, 10 mg at 05/03/18 0906    meclizine (ANTIVERT) tablet 12 5 mg, 12 5 mg, Oral, Daily, Harriet Mccabe PA-C, 12 5 mg at 05/03/18 0906    pantoprazole (PROTONIX) EC tablet 40 mg, 40 mg, Oral, Daily, Harriet Mccabe PA-C, 40 mg at 05/03/18 0906    pravastatin (PRAVACHOL) tablet 40 mg, 40 mg, Oral, HS, Harriet Mccabe PA-C, 40 mg at 05/02/18 2301    psyllium (METAMUCIL) 1 packet, 1 packet, Oral, TID, Priyank Riley PA-C, 1 packet at 05/01/18 1038    sacubitril-valsartan (ENTRESTO) 24-26 MG per tablet 1 tablet, 1 tablet, Oral, BID, Priyank Riley PA-C, 1 tablet at 05/03/18 0906    temazepam (RESTORIL) capsule 15 mg, 15 mg, Oral, HS PRN, Priyank Riley PA-C    traMADol (ULTRAM) tablet 50 mg, 50 mg, Oral, Q6H PRN, Priyank Riley PA-C, 50 mg at 05/03/18 0341    warfarin (COUMADIN) tablet 5 mg, 5 mg, Oral, Once (warfarin), Catherine Brown PA-C    Objective   Vitals: Blood pressure (!) 80/52, pulse 75, temperature 97 8 °F (36 6 °C), temperature source Oral, resp  rate 18, height 4' 11" (1 499 m), weight 52 1 kg (114 lb 13 8 oz), SpO2 96 %  , Body mass index is 23 2 kg/m² , Orthostatic Blood Pressures      Most Recent Value   Blood Pressure   80/52 filed at 05/03/2018 1202   Patient Position - Orthostatic VS  Sitting filed at 05/03/2018 6997        Systolic (34NMJ), IKV:24 , Min:80 , ASQ:639     Diastolic (49IVX), MPI:33, Min:41, Max:55      Intake/Output Summary (Last 24 hours) at 05/03/18 1559  Last data filed at 05/03/18 1300   Gross per 24 hour   Intake 640 ml   Output             5100 ml   Net            -4460 ml     Weight (last 2 days)     Date/Time   Weight    05/03/18 0600  52 1 (114 86)    05/02/18 0600  52 7 (116 18)    05/01/18 0600  52 8 (116 4)              No significant arrhythmias seen on telemetry review       Physical Exam:    GEN: Mega Shabazz appears well, alert and oriented x 3, pleasant and cooperative   NECK: supple, no carotid bruits, no JVD or HJR  HEART: normal rate, regular rhythm, S1 click, normal S2, no murmur  LUNGS: clear to auscultation bilaterally; no wheezes, rales, or rhonchi   ABDOMEN: normal bowel sounds, soft, no tenderness, no distention  EXTREMITIES: peripheral pulses normal; no clubbing, cyanosis, or edema      Laboratory Results:        CBC with diff:   Results from last 7 days  Lab Units 04/29/18  0820   WBC Thousand/uL 5 30   HEMOGLOBIN g/dL 10 4*   HEMATOCRIT % 32 8*   MCV fL 93   PLATELETS Thousands/uL 251   MCH pg 29 6   MCHC g/dL 31 7   RDW % 14 4   MPV fL 11 9         CMP:  Results from last 7 days  Lab Units 04/30/18  0456   SODIUM mmol/L 138   POTASSIUM mmol/L 4 4   CHLORIDE mmol/L 101   CO2 mmol/L 31   ANION GAP mmol/L 6   BUN mg/dL 17   CREATININE mg/dL 1 22   GLUCOSE RANDOM mg/dL 90   CALCIUM mg/dL 9 1   AST U/L 29   ALT U/L 26   ALK PHOS U/L 75   TOTAL PROTEIN g/dL 7 0   BILIRUBIN TOTAL mg/dL 0 28   EGFR ml/min/1 73sq m 45         BMP:  Results from last 7 days  Lab Units 04/30/18  0456   SODIUM mmol/L 138   POTASSIUM mmol/L 4 4   CHLORIDE mmol/L 101   CO2 mmol/L 31   BUN mg/dL 17   CREATININE mg/dL 1 22   GLUCOSE RANDOM mg/dL 90   CALCIUM mg/dL 9 1       BNP:  Invalid input(s): NTPROBNP    Magnesium:       Coags:   Results from last 7 days  Lab Units 05/03/18  0605 05/02/18  0292 05/02/18  0514 05/01/18  0525 04/30/18  0456 04/29/18  2326 04/29/18  1522 04/29/18  0820   PTT seconds 83* 86*  --  71* 60* 75* 40* 32   INR  1 71*  --  1 43* 1 09 1 08  --   --  1 07       TSH: @[facility](TSH:1)    Hemoglobin A1C )  Results from last 7 days  Lab Units 04/29/18  0820   HEMOGLOBIN A1C % 6 0       Lipid Profile:   Results from last 7 days  Lab Units 04/29/18  0820   CHOLESTEROL mg/dL 106   TRIGLYCERIDES mg/dL 94   HDL mg/dL 54             Fernando Mckinney MD    Portions of the record may have been created with voice recognition software   Occasional wrong word or "sound a like" substitutions may have occurred due to the inherent limitations of voice recognition software   Read the chart carefully and recognize, using context, where substitutions have occurred

## 2018-05-03 NOTE — CONSULTS
Consultation - Cardiothoracic Surgery   Tea Reyes 81 y o  female MRN: [id number]  Unit/Bed#: [facility] 425-01 Encounter: [id number]    Physician Requesting Consult: Tal Vasquez MD    Reason for Consult / Principal Problem:  Severe aortic stenosis    Consults  History of Present Illness: Tea Reyes is a 81y o  year old female known to our service who was previously seen in consultation by Dr Domenica Dc for evaluation of severe symptomatic aortic stenosis  The patient has a complicated cardiovascular past medical history  She previously had severe mitral regurgitation which was treated with mitral valve repair in 2007  However she had early failure of her mitral valve repair review requiring redo sternotomy and mitral valve replacement with mechanical valve  Since that time she follows with outpatient cardiologist, Dr Eddie Law  Patient presented to [facility] for evaluation of aortic stenosis  Her primary complaint at time of admission was exertional dyspnea and chest pain  Echocardiogram was performed and progression of aortic stenosis into the severe range was identified  By report, she was told that she needed to come to [facility] for more expedient evaluation and treatment  She was transferred to our service and preoperative workup was completed during this hospitalization  She now presents for 2nd surgeon visit in the with Dr Cuca Miller      Past Medical History:  Past Medical History:   Diagnosis Date    Aortic valvar stenosis     CAD (coronary artery disease) of bypass graft     Carotid stenosis, asymptomatic, bilateral 04/2018    50-69% b/l    [facility]     DM2 (diabetes mellitus, type 2) ([facility]     H/O Hodgkin's lymphoma     History of cervical cancer     History of ischemic cardiomyopathy     History of uterine cancer     HLD (hyperlipidemia)     HTN (hypertension)     LBBB (left bundle branch block)     Mitral regurgitation     [facility]per[facility]     Renal artery stenosis ([facility])     s/p renal artery stent    SSS (sick sinus syndrome) ([facility])     s/p ppm       Past Surgical History:   Past Surgical History:   Procedure Laterality Date    APPENDECTOMY      CARDIAC PACEMAKER PLACEMENT      CARDIAC SURGERY  09/2007    MV Repair, CABG x 2 by Dr Bar Mattson @ [address] [facility]  01/2008    Redo Sternotomy, MVR #25mm St  Victor Manuel Mechanical    CHOLECYSTECTOMY      COLECTOMY      RENAL ARTERY STENT      TONSILLECTOMY         Family History:  History reviewed  No pertinent family history  Social History:  History   Alcohol Use No     History   Drug Use No     History   Smoking Status    Former Smoker    Years: 15 00    Types: Cigarettes    Quit date: 2016   Smokeless Tobacco    Never Used     Marital Status: /Civil Union    Home Medications:   Prior to Admission medications    Medication Sig Start Date End Date Taking?  Authorizing Provider   amLODIPine (NORVASC) 2 5 mg tablet Take 2 5 mg by mouth daily   Yes Historical Provider, MD   aspirin 81 mg chewable tablet Chew 81 mg daily   Yes Historical Provider, MD   cholecalciferol (VITAMIN D3) 1,000 units tablet Take 2,000 Units by mouth daily   Yes Historical Provider, MD   clopidogrel (PLAVIX) 75 mg tablet Take 75 mg by mouth daily   Yes Historical Provider, MD   diltiazem (CARDIZEM) 120 MG tablet Take 120 mg by mouth 4 (four) times a day   Yes Historical Provider, MD   diphenoxylate-atropine (LOMOTIL) 2 5-0 025 mg per tablet Take 1 tablet by mouth 4 (four) times a day as needed   Yes Historical Provider, MD   furosemide (LASIX) 40 mg tablet Take 40 mg by mouth daily   Yes Historical Provider, MD   gabapentin (NEURONTIN) 100 mg capsule Take 100 mg by mouth daily at bedtime 11/22/16  Yes Historical Provider, MD   isosorbide dinitrate (ISORDIL) 5 mg tablet Take 5 mg by mouth every 8 (eight) hours   Yes Historical Provider, MD   lisinopril (ZESTRIL) 5 mg tablet Take 2 5 mg by mouth daily   Yes Historical Provider, MD   loperamide (IMODIUM) 2 mg capsule Take 4 mg by mouth 4 (four) times a day as needed for diarrhea   Yes Historical Provider, MD   meclizine (ANTIVERT) 32 MG tablet Take 12 5 mg by mouth daily   Yes Historical Provider, MD   meloxicam (MOBIC) 7 5 mg tablet Take 7 5 mg by mouth 2 (two) times a day as needed   Yes Historical Provider, MD   pantoprazole (PROTONIX) 40 mg tablet Take 40 mg by mouth daily   Yes Historical Provider, MD   pravastatin (PRAVACHOL) 20 mg tablet Take 40 mg by mouth daily at bedtime   Yes Historical Provider, MD   calcium polycarbophil (FIBERCON) 625 mg tablet Take 625 mg by mouth 3 (three) times a day    Historical Provider, MD   cetirizine (ZyrTEC) 10 mg tablet Take 10 mg by mouth daily 11/22/16   Historical Provider, MD       Inpatient Medications:  Scheduled Meds:   Current Facility-Administered Medications:  acetaminophen 650 mg Oral Q6H PRN CHAU Jones-SANTIAGO    aspirin 81 mg Oral Daily Shelvia Gemma PA-C    cholecalciferol 2,000 Units Oral Daily Shelvia Gemma PA-C    clopidogrel 75 mg Oral Daily Shelvia Gemma PA-C    diphenoxylate-atropine 1 tablet Oral 4x Daily PRN CHAU Jones-C    furosemide 40 mg Oral Daily Shelvia Gemma, PA-C    gabapentin 100 mg Oral HS Chriss Menendez PA-C    heparin (porcine) 3-20 Units/kg/hr (Order-Specific) Intravenous Titrated Chriss Menendez PA-C Last Rate: 16 Units/kg/hr (05/02/18 4728)   isosorbide dinitrate 5 mg Oral Q8H [address]    loratadine 10 mg Oral Daily Shelvia CHAU Menendez-SANTIAGO    meclizine 12 5 mg Oral Daily Shelvia CHAU Menendez-C    pantoprazole 40 mg Oral Daily Shelvia Gemma, PA-C    pravastatin 40 mg Oral HS Chriss Menendez PA-C    psyllium 1 packet Oral TID CHAU Jones-C    sacubitril-valsartan 1 tablet Oral BID Harriet Mccabe PA-C    temazepam 15 mg Oral HS PRN ShelCHAU Ballesteros-C    traMADol 50 mg Oral Q6H PRN Tempie Flash, [place]    warfarin 5 mg Oral Once (warfarin) Catie Hubbard PA-C      Continuous Infusions:   heparin (porcine) 3-20 Units/kg/hr (Order-Specific) Last Rate: 16 Units/kg/hr (05/02/18 5229)     PRN Meds:    acetaminophen 650 mg Q6H PRN   diphenoxylate-atropine 1 tablet 4x Daily PRN   temazepam 15 mg HS PRN   traMADol 50 mg Q6H PRN       Allergies: Allergies   Allergen Reactions    Contrast [Iodinated Diagnostic Agents] Throat Swelling    Penicillins Throat Swelling    Ranolazine Vomiting     ranexa    Sulfa Antibiotics Throat Swelling       Review of Systems:  Review of Systems - History obtained from the patient  General ROS: positive for  - fatigue and malaise  Psychological ROS: negative  Ophthalmic ROS: negative  ENT ROS: negative  Allergy and Immunology ROS: negative  Hematological and Lymphatic ROS: negative  Endocrine ROS: negative  Breast ROS: negative for breast lumps  Respiratory ROS: positive for - cough and shortness of breath  Cardiovascular ROS: no chest pain or dyspnea on exertion  Gastrointestinal ROS: no abdominal pain, change in bowel habits, or black or bloody stools  Genito-Urinary ROS: no dysuria, trouble voiding, or hematuria  Musculoskeletal ROS: negative  Neurological ROS: no TIA or stroke symptoms  Dermatological ROS: negative    Vital Signs:     Vitals:    05/03/18 0632 05/03/18 0729 05/03/18 0841 05/03/18 1202   BP: (!) 88/42 90/50 (!) 98/42 (!) 80/52   BP Location:  Left arm  Left arm   Pulse:  70  75   Resp:  20  18   Temp:  97 9 °F (36 6 °C)  97 8 °F (36 6 °C)   TempSrc:  Oral  Oral   SpO2:  98%  96%   Weight:       Height:         Invasive Devices     Peripheral Intravenous Line            Peripheral IV 04/30/18 Right Hand 3 days                Physical Exam:    HEENT/NECK:  PERRLA  No jugular venous distention  Cardiac:Regular rate and rhythm  Pulmonary:  Breath sounds clear bilaterally  Abdomen:  Non-tender, Non-distended  Positive bowel sounds    Lower extremities: Extremities warm/dry  Radial/PT/DP pules 2+ bilaterally  No edema B/L  Neuro: Alert and oriented X 3  Sensation is grossly intact  No focal deficits  Skin: Warm/Dry, without rashes or lesions  Lab Results:       Results from last 7 days  Lab Units 04/29/18  0820   WBC Thousand/uL 5 30   HEMOGLOBIN g/dL 10 4*   HEMATOCRIT % 32 8*   PLATELETS Thousands/uL 251       Results from last 7 days  Lab Units 04/30/18  0456   SODIUM mmol/L 138   POTASSIUM mmol/L 4 4   CHLORIDE mmol/L 101   CO2 mmol/L 31   BUN mg/dL 17   CREATININE mg/dL 1 22   GLUCOSE RANDOM mg/dL 90   CALCIUM mg/dL 9 1       Results from last 7 days  Lab Units 05/03/18  0605 05/02/18  0652 05/02/18  0514 05/01/18  0525   INR  1 71*  --  1 43* 1 09   PTT seconds 83* 86*  --  71*     Lab Results   Component Value Date    HGBA1C 6 0 04/29/2018     No results found for: CKTOTAL, CKMB, CKMBINDEX, TROPONINI    Imaging Studies:     Cardiac Catheterization: Performed at [facility]; Report pending  Echocardiogram: EF 45%, AV trileaflet, mean gradient 30, max 49  Moderate AS, Mod AI, mechanical MV prosthesis with normal function  Trivial TR  I have personally reviewed pertinent reports  and I have personally reviewed pertinent films in PACS    Assessment:  Patient Active Problem List    Diagnosis Date Noted    Aortic valve stenosis 05/03/2018    Nonrheumatic aortic valve stenosis     Coronary artery disease involving coronary bypass graft of native heart without angina pectoris     [facility]     DM2 (diabetes mellitus, type 2) ([facility]     Essential hypertension     HLD (hyperlipidemia)     History of ischemic cardiomyopathy     H/O mitral valve replacement with mechanical valve      Severe aortic stenosis; Ongoing TAVR workup    Plan:    Fidelia Singleton has severe symptomatic aortic stenosis  Based on their STS risk assessment, they have undergone testing for transcatheter aortic valve replacement    The results of these studies have been interpreted by two independent cardiac surgeons who have determined the patient to be High risk for open aortic valve replacement  The risks, benefits, and alternatives to TAVR were discussed in detail with the patient today  They understand and wish to proceed with transfemoral transcatheter aortic valve replacement  Informed consent was obtained and a date for the intervention has been set         SIGNATURE: Adams Johnston PA-C  DATE: May 3, 2018  TIME: 3:54 PM

## 2018-05-03 NOTE — CASE MANAGEMENT
Continued Stay Review    Date: 05/03/2018    Vital Signs: BP (!) 80/52 (BP Location: Left arm)   Pulse 75   Temp 97 8 °F (36 6 °C) (Oral)   Resp 18   Ht 4' 11" (1 499 m)   Wt 52 1 kg (114 lb 13 8 oz)   SpO2 96%   BMI 23 20 kg/m²     Medications:   Scheduled Meds:   Current Facility-Administered Medications:  acetaminophen 650 mg Oral Q6H PRN Gladis Johnson, PA-C    aspirin 81 mg Oral Daily Gladis [place], PA-C    cholecalciferol 2,000 Units Oral Daily Gladis [place], PA-C    clopidogrel 75 mg Oral Daily Gladis [place], PA-C    diphenoxylate-atropine 1 tablet Oral 4x Daily PRN Gladis Johnson, PA-C    furosemide 40 mg Oral Daily Gladis [place], PA-C    gabapentin 100 mg Oral HS Gladis [place], PA-C    heparin (porcine) 3-20 Units/kg/hr (Order-Specific) Intravenous Titrated Gladis Ornelas, PA-C Last Rate: 16 Units/kg/hr (05/02/18 3139)   isosorbide dinitrate 5 mg Oral Q8H [address] Harriet CONNER Mccabe    loratadine 10 mg Oral Daily Gladis [place], PA-C    meclizine 12 5 mg Oral Daily Gladis [place], PA-C    pantoprazole 40 mg Oral Daily Gladis Johnson, PA-C    pravastatin 40 mg Oral HS Gladis Johnson, PA-C    psyllium 1 packet Oral TID Gladis [place], PA-C    sacubitril-valsartan 1 tablet Oral BID Gladis Johnson, PA-C    temazepam 15 mg Oral HS PRN Gladis [place], PA-C    traMADol 50 mg Oral Q6H PRN Gladis [place], PA-C    warfarin 5 mg Oral Once (warfarin) Macey Amanda PA-C      Continuous Infusions:   heparin (porcine) 3-20 Units/kg/hr (Order-Specific) Last Rate: 16 Units/kg/hr (05/02/18 5412)     Abnormal Labs/Diagnostic Results:     Age/Sex: 81 y o  female     Assessment/Plan:   Assessment:      Patient Active Problem List   Diagnosis    Nonrheumatic aortic valve stenosis    Coronary artery disease involving coronary bypass graft of native heart without angina pectoris    [facility]    DM2 (diabetes mellitus, type 2) ([facility] )    Essential hypertension    HLD (hyperlipidemia)    History of ischemic cardiomyopathy    H/O mitral valve replacement with mechanical valve    Aortic valve stenosis      Severe aortic stenosis; Ongoing TAVR workup     Plan:  Patient agreeable to proceed with surgery;   INR 1 7; Coumadin, 5 mg today  Continue heparin until INR therpeutic  LORA completed  CTA completed today  Carotids completed  Continue heparin; Coumadin 2 mg dosed  Case discussed with KYE Rousseau  Second surgeon visit with Dr Ginna Mann today      Discharge Plan: TBD

## 2018-05-03 NOTE — PROGRESS NOTES
Progress Note - Cardiothoracic Surgery   Nikolas Guerrero 81 y o  female MRN: [id number]  Unit/Bed#: [facility] 425-01 Encounter: [id number]      24 Hour Events: Resting comfortably  No events overnight  CTA completed today       Medications:   Scheduled Meds:    Current Facility-Administered Medications:  acetaminophen 650 mg Oral Q6H PRN [address] [place] [address]    aspirin 81 mg Oral Daily [address] [place] [address]    cholecalciferol 2,000 Units Oral Daily [address] [place] [address]    clopidogrel 75 mg Oral Daily [address] [place] [address]    diphenoxylate-atropine 1 tablet Oral 4x Daily PRN [address] [place] [address]    furosemide 40 mg Oral Daily [address] [place] [address]    gabapentin 100 mg Oral HS [address] [place] [address]    heparin (porcine) 3-20 Units/kg/hr (Order-Specific) Intravenous Titrated [address] [place] [address] Last Rate: 16 Units/kg/hr (05/02/18 2349)   isosorbide dinitrate 5 mg Oral Q8H [facility] Harriet Mccabe [facility]    loratadine 10 mg Oral Daily [address] [place] [address]    meclizine 12 5 mg Oral Daily [address] [place] [address]    pantoprazole 40 mg Oral Daily [address] [place] [address]    pravastatin 40 mg Oral HS [address] [place] [address]    psyllium 1 packet Oral TID [address] [place] [address]    sacubitril-valsartan 1 tablet Oral BID Harriet Estelle [facility]    temazepam 15 mg Oral HS PRN [address] [place] [address]    traMADol 50 mg Oral Q6H PRN [address] [place] [address]      Continuous Infusions:    heparin (porcine) 3-20 Units/kg/hr (Order-Specific) Last Rate: 16 Units/kg/hr (05/02/18 2349)       Results:     Results from last 7 days  Lab Units 04/29/18  0820   WBC Thousand/uL 5 30   HEMOGLOBIN g/dL 10 4*   HEMATOCRIT % 32 8*   PLATELETS Thousands/uL 251       Results from last 7 days  Lab Units 04/30/18  0456   SODIUM mmol/L 138   POTASSIUM mmol/L 4 4   CHLORIDE mmol/L 101   CO2 mmol/L 31   BUN mg/dL 17   CREATININE mg/dL 1 22   GLUCOSE RANDOM mg/dL 90   CALCIUM mg/dL 9 1       Results from last 7 days  Lab Units 05/03/18  0605 05/02/18  7066 05/02/18  6794 05/01/18  0525   INR  1 71*  --  1 43* 1 09   PTT seconds 83* 86*  --  71*     Coumadin mg 5 5 2 2 2        Studies:     Echocardiogram: EF 45%; Well seated mechanical mitral valve  Trileaflet AV with severe AS and Mild to moderate AI  Carotid artery duplex: 50-69 % Bilateral carotid stenosis with antegrade vertebral flow     CTA: Completed     Vitals:   Vitals:    05/03/18 0600 05/03/18 0632 05/03/18 0729 05/03/18 0841   BP:  (!) 88/42 90/50 (!) 98/42   BP Location:   Left arm    Pulse:   70    Resp:   20    Temp:   97 9 °F (36 6 °C)    TempSrc:   Oral    SpO2:   98%    Weight: 52 1 kg (114 lb 13 8 oz)      Height:           Physical Exam:    HEENT/NECK:  PERRLA  No jugular venous distention  Cardiac: Regular rate and rhythm  No rubs/murmurs/gallops  Pulmonary:  Breath sounds slightly diminished at the bases bilaterally  Abdomen:  Non-tender, Non-distended  Positive bowel sounds  Lower extremities: Extremities warm/dry  Radial/PT/DP pulses 2+ bilaterally  No edema B/L  Neuro: Alert and oriented X 3  Sensation is grossly intact  No focal deficits  Skin: Warm/Dry, without rashes or lesions  Assessment:  Patient Active Problem List   Diagnosis    Nonrheumatic aortic valve stenosis    Coronary artery disease involving coronary bypass graft of native heart without angina pectoris    [facility]    [facility]    Essential hypertension    HLD (hyperlipidemia)    History of ischemic cardiomyopathy    H/O mitral valve replacement with mechanical valve    Aortic valve stenosis     Severe aortic stenosis; Ongoing TAVR workup    Plan:  Patient agreeable to proceed with surgery;   INR 1 7; Coumadin, 5 mg today  Continue heparin until INR therpeutic  LORA completed  CTA completed today  Carotids completed  Continue heparin; Coumadin 2 mg dosed  Case discussed with KYE Tsang    Second surgeon visit with Dr Mavis Mast today     SIGNATUREGracie Pearson PA-C  DATE: May 3, 2018  TIME: 9:00 AM

## 2018-05-04 VITALS
BODY MASS INDEX: 23.16 KG/M2 | DIASTOLIC BLOOD PRESSURE: 50 MMHG | WEIGHT: 114.86 LBS | SYSTOLIC BLOOD PRESSURE: 90 MMHG | OXYGEN SATURATION: 97 % | RESPIRATION RATE: 20 BRPM | HEIGHT: 59 IN | HEART RATE: 70 BPM | TEMPERATURE: 97.6 F

## 2018-05-04 LAB
ABO GROUP BLD: NORMAL
APTT PPP: 95 SECONDS (ref 23–35)
BLD GP AB SCN SERPL QL: POSITIVE
BLOOD GROUP ANTIBODIES SERPL: NORMAL
E AG RBC QL: NEGATIVE
INR PPP: 2.28 (ref 0.86–1.16)
PROTHROMBIN TIME: 25.4 SECONDS (ref 12.1–14.4)
RH BLD: POSITIVE
SPECIMEN EXPIRATION DATE: NORMAL

## 2018-05-04 PROCEDURE — 86901 BLOOD TYPING SEROLOGIC RH(D): CPT | Performed by: PHYSICIAN ASSISTANT

## 2018-05-04 PROCEDURE — 86900 BLOOD TYPING SEROLOGIC ABO: CPT | Performed by: PHYSICIAN ASSISTANT

## 2018-05-04 PROCEDURE — 86850 RBC ANTIBODY SCREEN: CPT | Performed by: PHYSICIAN ASSISTANT

## 2018-05-04 PROCEDURE — 85610 PROTHROMBIN TIME: CPT | Performed by: PHYSICIAN ASSISTANT

## 2018-05-04 PROCEDURE — 86905 BLOOD TYPING RBC ANTIGENS: CPT

## 2018-05-04 PROCEDURE — 99238 HOSP IP/OBS DSCHRG MGMT 30/<: CPT | Performed by: THORACIC SURGERY (CARDIOTHORACIC VASCULAR SURGERY)

## 2018-05-04 PROCEDURE — 85730 THROMBOPLASTIN TIME PARTIAL: CPT | Performed by: THORACIC SURGERY (CARDIOTHORACIC VASCULAR SURGERY)

## 2018-05-04 PROCEDURE — 86870 RBC ANTIBODY IDENTIFICATION: CPT | Performed by: PHYSICIAN ASSISTANT

## 2018-05-04 RX ORDER — WARFARIN SODIUM 2 MG/1
TABLET ORAL
Qty: 30 TABLET | Refills: 1 | Status: ON HOLD | OUTPATIENT
Start: 2018-05-04 | End: 2018-05-26

## 2018-05-04 RX ORDER — LOPERAMIDE HYDROCHLORIDE 2 MG/1
4 CAPSULE ORAL 4 TIMES DAILY PRN
Qty: 30 CAPSULE | Refills: 0 | Status: SHIPPED | OUTPATIENT
Start: 2018-05-04

## 2018-05-04 RX ORDER — CLOPIDOGREL BISULFATE 75 MG/1
75 TABLET ORAL DAILY
Qty: 30 TABLET | Refills: 1 | Status: ON HOLD | OUTPATIENT
Start: 2018-05-04 | End: 2018-05-26

## 2018-05-04 RX ORDER — WARFARIN SODIUM 5 MG/1
5 TABLET ORAL
Status: DISCONTINUED | OUTPATIENT
Start: 2018-05-04 | End: 2018-05-04 | Stop reason: HOSPADM

## 2018-05-04 RX ORDER — MELOXICAM 7.5 MG/1
7.5 TABLET ORAL 2 TIMES DAILY PRN
Qty: 60 TABLET | Refills: 1 | Status: ON HOLD | OUTPATIENT
Start: 2018-05-04 | End: 2018-05-19 | Stop reason: ALTCHOICE

## 2018-05-04 RX ORDER — GABAPENTIN 100 MG/1
100 CAPSULE ORAL
Qty: 30 CAPSULE | Refills: 1 | Status: ON HOLD | OUTPATIENT
Start: 2018-05-04 | End: 2018-05-19 | Stop reason: ALTCHOICE

## 2018-05-04 RX ORDER — PANTOPRAZOLE SODIUM 40 MG/1
40 TABLET, DELAYED RELEASE ORAL DAILY
Qty: 30 TABLET | Refills: 1 | Status: ON HOLD | OUTPATIENT
Start: 2018-05-04 | End: 2018-05-26

## 2018-05-04 RX ORDER — AMLODIPINE BESYLATE 2.5 MG/1
2.5 TABLET ORAL DAILY
Qty: 30 TABLET | Refills: 1 | Status: ON HOLD | OUTPATIENT
Start: 2018-05-04 | End: 2018-05-19 | Stop reason: ALTCHOICE

## 2018-05-04 RX ORDER — TRAMADOL HYDROCHLORIDE 50 MG/1
50 TABLET ORAL ONCE
Status: COMPLETED | OUTPATIENT
Start: 2018-05-04 | End: 2018-05-04

## 2018-05-04 RX ORDER — ISOSORBIDE DINITRATE 5 MG/1
5 TABLET ORAL EVERY 8 HOURS
Qty: 30 TABLET | Refills: 1 | Status: ON HOLD | OUTPATIENT
Start: 2018-05-04 | End: 2018-05-19 | Stop reason: ALTCHOICE

## 2018-05-04 RX ORDER — CALCIUM POLYCARBOPHIL 625 MG 625 MG/1
625 TABLET ORAL 3 TIMES DAILY
Qty: 30 TABLET | Refills: 1 | Status: ON HOLD | OUTPATIENT
Start: 2018-05-04 | End: 2018-05-19 | Stop reason: ALTCHOICE

## 2018-05-04 RX ORDER — FUROSEMIDE 40 MG/1
40 TABLET ORAL DAILY
Status: DISCONTINUED | OUTPATIENT
Start: 2018-05-05 | End: 2018-05-04 | Stop reason: HOSPADM

## 2018-05-04 RX ORDER — FUROSEMIDE 40 MG/1
40 TABLET ORAL DAILY
Qty: 30 TABLET | Refills: 1 | Status: ON HOLD | OUTPATIENT
Start: 2018-05-04 | End: 2018-05-26

## 2018-05-04 RX ORDER — CETIRIZINE HYDROCHLORIDE 10 MG/1
10 TABLET ORAL DAILY
Qty: 30 TABLET | Refills: 1 | Status: ON HOLD | OUTPATIENT
Start: 2018-05-04 | End: 2018-05-19 | Stop reason: ALTCHOICE

## 2018-05-04 RX ORDER — LISINOPRIL 5 MG/1
2.5 TABLET ORAL DAILY
Qty: 30 TABLET | Refills: 1 | Status: ON HOLD | OUTPATIENT
Start: 2018-05-04 | End: 2018-05-19 | Stop reason: ALTCHOICE

## 2018-05-04 RX ORDER — MELATONIN
2000 DAILY
Qty: 30 TABLET | Refills: 1 | Status: SHIPPED | OUTPATIENT
Start: 2018-05-04

## 2018-05-04 RX ORDER — DILTIAZEM HYDROCHLORIDE 120 MG/1
120 TABLET, FILM COATED ORAL 4 TIMES DAILY
Qty: 120 TABLET | Refills: 1 | Status: ON HOLD | OUTPATIENT
Start: 2018-05-04 | End: 2018-05-19 | Stop reason: ALTCHOICE

## 2018-05-04 RX ORDER — WARFARIN SODIUM 5 MG/1
5 TABLET ORAL
Qty: 1 TABLET | Refills: 0 | Status: ON HOLD | OUTPATIENT
Start: 2018-05-04 | End: 2018-05-19 | Stop reason: ALTCHOICE

## 2018-05-04 RX ORDER — PRAVASTATIN SODIUM 20 MG
40 TABLET ORAL
Qty: 30 TABLET | Refills: 1 | Status: ON HOLD | OUTPATIENT
Start: 2018-05-04 | End: 2018-05-26

## 2018-05-04 RX ORDER — ASPIRIN 81 MG/1
81 TABLET, CHEWABLE ORAL DAILY
Qty: 30 TABLET | Refills: 1 | Status: ON HOLD | OUTPATIENT
Start: 2018-05-04 | End: 2018-05-26

## 2018-05-04 RX ORDER — MECLIZINE HCL 12.5 MG/1
12.5 TABLET ORAL DAILY
Qty: 30 TABLET | Refills: 1 | Status: ON HOLD | OUTPATIENT
Start: 2018-05-04 | End: 2018-05-19 | Stop reason: ALTCHOICE

## 2018-05-04 RX ADMIN — LORATADINE 10 MG: 10 TABLET ORAL at 09:37

## 2018-05-04 RX ADMIN — FUROSEMIDE 40 MG: 40 TABLET ORAL at 09:45

## 2018-05-04 RX ADMIN — VITAMIN D, TAB 1000IU (100/BT) 2000 UNITS: 25 TAB at 09:37

## 2018-05-04 RX ADMIN — MECLIZINE HCL 12.5 MG 12.5 MG: 12.5 TABLET ORAL at 09:37

## 2018-05-04 RX ADMIN — ENOXAPARIN SODIUM 80 MG: 80 INJECTION SUBCUTANEOUS at 12:17

## 2018-05-04 RX ADMIN — TRAMADOL HYDROCHLORIDE 50 MG: 50 TABLET, FILM COATED ORAL at 10:37

## 2018-05-04 RX ADMIN — TRAMADOL HYDROCHLORIDE 50 MG: 50 TABLET, FILM COATED ORAL at 09:37

## 2018-05-04 RX ADMIN — CLOPIDOGREL BISULFATE 75 MG: 75 TABLET ORAL at 09:37

## 2018-05-04 RX ADMIN — ASPIRIN 81 MG 81 MG: 81 TABLET ORAL at 09:37

## 2018-05-04 RX ADMIN — PANTOPRAZOLE SODIUM 40 MG: 40 TABLET, DELAYED RELEASE ORAL at 09:37

## 2018-05-04 NOTE — DISCHARGE INSTRUCTIONS
Will return for pre-operative bridge from Coumadin to heparin on 5/19/18 then will have TAVR on 5/22/18  Transcatheter Aortic Valve Replacement   WHAT YOU NEED TO KNOW:   What do I need to know about a transcatheter aortic valve replacement (TAVR)? · A TAVR is a procedure to replace your aortic valve  It is done without removing your old valve  The aortic valve is between the left ventricle and the aorta  The left ventricle is the lower left chamber of your heart  The aorta is a blood vessel that pumps blood to your brain and body  The aortic valve opens and closes to let blood flow from your heart to the rest of your body  · TAVR may be used to replace your aortic valve if open heart surgery is not safe for you  Your valve will be replaced with a tissue valve  The tissue may be taken from an animal, such as a pig or cow  What will happen before a TAVR? · You may need an echocardiogram, angiogram, EKG, or CT scan before your procedure  These tests will help your healthcare provider plan your procedure  They will also make sure a TAVR is safe for you  You may need to stop taking blood thinner medicine several days before your procedure  This will prevent heavy bleeding during your procedure  · Your healthcare provider will talk to you about how to prepare for your procedure  He may tell you not to eat or drink anything after midnight on the day of your procedure  He will tell you what medicines to take or not take on the day of your procedure  You may stay in the hospital 2 to 5 days after your procedure  Arrange for someone to drive you home and stay with you  This person can call 911 if you have problems at home  What will happen during a TAVR? · You may be given general anesthesia to keep you asleep and free from pain during your procedure  You may instead be given IV sedation and local anesthesia  IV sedation will make you feel calm and relaxed during the procedure   With local anesthesia, you may still feel pressure or pushing during your procedure, but you should not feel any pain  You may be given an antibiotic through your IV to prevent a bacterial infection  Tell healthcare providers if you have ever had an allergic reaction to an antibiotic  · Your healthcare provider will make an incision in your neck, groin, or chest  He will guide a catheter with a balloon on the end through a blood vessel and into your heart  He will inflate the balloon in the opening of your valve  This balloon make space for your new valve to fit inside the old one  The balloon will be deflated and the catheter will be removed  · Your healthcare provider will insert another catheter into your heart  This catheter will hold a balloon, a stent, and the new valve  The new valve will be inside of the stent  When the catheter reaches your valve, your healthcare provider will expand the balloon  The balloon will push your old valve against the walls of your heart  The stent and new valve will be put in the old valve's position  The balloon will be deflated  The stent will continue to hold your old valve out of the way  It will also keep your new valve in the correct place  · Your healthcare provider will remove the catheter and wire  He may use clamps, stitches, or other devices to close the incision  Pressure will be applied to the incision for several minutes to stop any bleeding  A pressure bandage or other pressure device may be placed over the incision to help prevent more bleeding  What will happen after a TAVR? · Healthcare providers will monitor your vital signs and pulses in your arm or leg, closely  They will check your pressure bandage for bleeding or swelling  You will need to lie flat with your leg or arm straight for 2 to 4 hours  Do not  get out of bed until healthcare providers says it is okay  Arm or leg movements can cause serious bleeding        · You may need blood tests, a chest x-ray, an EKG, or an echocardiogram before you leave the hospital  These tests will make sure your valve is working correctly  You will need to take blood thinner medicine for at least 6 months after your procedure  You may need to take aspirin for the rest of your life  These medicines will prevent blood clots from forming near your valve  What are the risks of a TAVR? You may have bruising or pain where the catheter was  You may get an infection or bleed more than expected  The catheter may cause a hole in your heart or blood vessels  A blood clot may form around your valve  The clot may travel to your brain and cause a stroke  Your heartbeat may become irregular during or after a TAVR  You may need medicines or procedures to treat this  Your new valve may not work correctly  You may need another procedure to replace the valve  CARE AGREEMENT:   You have the right to help plan your care  Learn about your health condition and how it may be treated  Discuss treatment options with your caregivers to decide what care you want to receive  You always have the right to refuse treatment  The above information is an  only  It is not intended as medical advice for individual conditions or treatments  Talk to your doctor, nurse or pharmacist before following any medical regimen to see if it is safe and effective for you  © 2017 [facility] Information is for End User's use only and may not be sold, redistributed or otherwise used for commercial purposes  All illustrations and images included in CareNotes® are the copyrighted property of A D [employer] M [employer]  or [address]

## 2018-05-04 NOTE — DISCHARGE SUMMARY
Discharge Summary - Cardiothoracic Surgery   Nadine Fong 81 y o  female MRN: [id number]  Unit/Bed#: [facility] 425-01 Encounter: [id number]    Admission Date: 4/28/2018     Discharge Date: 05/04/18    Admitting Diagnosis: Aortic stenosis [I35 0]    Primary Discharge Diagnosis:   Aortic stenosis, Non-Rheumatic  Secondary Discharge Diagnosis:   1  CAD, s/p CABG  2  S/p MV repair  3  S/p redo sternotomy w/ [facility] MVR  4  S/p multiple stent placement  5  B/l ICA stenosis  6  LBBB    Attending: KYE Castro  Consulting Physician(s):   Cardiology    Procedures Performed:   No orders of the defined types were placed in this encounter  None    Hospital Course:   4/27: Presented to [facility] w/chest heaviness  Has history of CABG/MV repair a few years ago, then redo with mechanical mitral valve placement after MV repair failure a few months after  Has undergone multiple stent placements, most recently LIMA  Found to have severe AS  CT consulted for TAVR w/u  Will be transferred Monday 4/29: Pt transferred to [facility] last night  Preop orders placed for TAVR w/u  Cardiology consulted    4/30: Asymptomatic today, on heparin gtt  Scheduled for CTA 9am tomorrow with allergy prep beforehand  Waiting for study results  5/1: Carotid dopplers completed  5/2: No events  INR 1 43; Coumadin,5 mg today  Continue heparin  Home when INR therapeutic  5/3: No events  INR 1 7; Coumadin,5 mg today  Continue heparin  Home when INR therapeutic  Second surgeon visit complete  5/4: No events INR 2 28, continue heparin gtt, dose Coumadin 5mg tonight & give SQ Lovenox shot PTD then resume Coumadin 2mg tomorrow w/ INR Monday  Condition at Discharge:   good     Discharge Physical Exam:  HEENT/NECK:  PERRLA  No jugular venous distention  Cardiac: Regular rate and rhythm  No rubs/murmurs/gallops  Pulmonary:  Breath sounds slightly diminished at the bases bilaterally    Abdomen:  Non-tender, Non-distended  Positive bowel sounds  Lower extremities: Extremities warm/dry  Radial/PT/DP pulses 2+ bilaterally  Trace edema B/L  Neuro: Alert and oriented X 3  Sensation is grossly intact  No focal deficits  Skin: Warm/Dry, without rashes or lesions  Discharge Data:    Results from last 7 days  Lab Units 04/29/18  0820   WBC Thousand/uL 5 30   HEMOGLOBIN g/dL 10 4*   HEMATOCRIT % 32 8*   PLATELETS Thousands/uL 251       Results from last 7 days  Lab Units 04/30/18  0456   SODIUM mmol/L 138   POTASSIUM mmol/L 4 4   CHLORIDE mmol/L 101   CO2 mmol/L 31   BUN mg/dL 17   CREATININE mg/dL 1 22   GLUCOSE RANDOM mg/dL 90   CALCIUM mg/dL 9 1       Results from last 7 days  Lab Units 05/04/18  0539 05/03/18  0605 05/02/18  0652 05/02/18  0514   INR  2 28* 1 71*  --  1 43*   PTT seconds 95* 83* 86*  --        Discharge instructions/Information to patient and family:   See after visit summary for information provided to patient and family  Martha Traylor was educated on restrictions regarding driving and lifting, and techniques of proper incisional care  They were specifically counselled on signs and symptoms of a sternal wound infection, and advised to contact our service immediately should they develop fevers, sweats, chill, redness or drainage at the site of any incisions  Provisions for Follow-Up Care:  See after visit summary for information related to follow-up care and any pertinent home health orders  Disposition:  Home    Planned Readmission:   Yes 5/19/18 for pre-operative TAVR heparin gtt    Discharge Medications:  See after visit summary for reconciled discharge medications provided to patient and family  Martha Traylor is to return on 5/19/18 for Coumadin to heparin bridge for TAVR on 5/22/18  Martha Traylor is being discharged on anticoagulation therapy for treatment of mechanical mitral valve replacement    As they have been treated with Coumadin prior to this admission, arrangements have been made for Dr Kevan Chin office to continue to monitor INR levels and provide dosing instructions  Their office was notified by phone call and facsimile report which itemized the patients daily INRs and correlating Coumadin doses during their hospitalization  The patient has been provided a prescription for PT/INR to be drawn every Monday and Thursday, with results to Dr Kevan Chin office  They have been prescribed Coumadin, 2 mg tabs, with 30 tablets being dispensed  Finally, they have been advised to take 2 mg daily starting 5/5/18 & 5mg PO x1 5/4/18, unless otherwise directed  I spent 30 minutes discharging the patient  This time was spent on the day of discharge  I had direct contact with the patient on the day of discharge  Additional documentation is required if more than 30 minutes were spent on discharge       SIGNATUREBryna Severs, PA-C  DATE: May 4, 2018  TIME: 10:49 AM

## 2018-05-04 NOTE — PROGRESS NOTES
Progress Note - Cardiothoracic Surgery   Liban Leach 81 y o  female MRN: [id number]  Unit/Bed#: [facility] 425-01 Encounter: [id number]  TAVR w/u    24 Hour Events: Second surgeon visit complete  No events  No complaints  Still agreeable to sx, all questions addressed during encounter      Medications:   Scheduled Meds:    Current Facility-Administered Medications:  acetaminophen 650 mg Oral Q6H PRN Aldona Salt, PA-C    aspirin 81 mg Oral Daily Aldona Salt, PA-C    cholecalciferol 2,000 Units Oral Daily Aldona Salt, PA-C    clopidogrel 75 mg Oral Daily Aldona Salt, PA-C    diphenoxylate-atropine 1 tablet Oral 4x Daily PRN Aldona Salt, PA-C    furosemide 40 mg Oral Daily Aldona Salt, PA-C    gabapentin 100 mg Oral HS Aldona Salt, PA-C    heparin (porcine) 3-20 Units/kg/hr (Order-Specific) Intravenous Titrated Aldona Salt, PA-C Last Rate: 16 Units/kg/hr (05/03/18 1642)   isosorbide dinitrate 5 mg Oral Q8H [address] Harriet Kerecman, PA-C    loratadine 10 mg Oral Daily Aldona Salt, PA-C    meclizine 12 5 mg Oral Daily Aldona Salt, PA-C    pantoprazole 40 mg Oral Daily Aldona Salt, PA-C    pravastatin 40 mg Oral HS Aldona Salt, PA-C    psyllium 1 packet Oral TID Aldona Salt, PA-C    sacubitril-valsartan 1 tablet Oral BID Harriet Kerecman, PA-C    temazepam 15 mg Oral HS PRN Aldona Salt, PA-C    traMADol 50 mg Oral Q6H PRN Aldona Salt, PA-C      Continuous Infusions:    heparin (porcine) 3-20 Units/kg/hr (Order-Specific) Last Rate: 16 Units/kg/hr (05/03/18 1642)     PRN Meds:   acetaminophen    diphenoxylate-atropine    temazepam    traMADol    Vitals:   Vitals:    05/03/18 2007 05/04/18 0015 05/04/18 0334 05/04/18 0732   BP: 97/50 115/51 108/54 90/50   BP Location: Right arm Left arm Right arm Left arm   Pulse: 70 71 70 70   Resp: 17 18 18 20   Temp: 97 8 °F (36 6 °C) 98 °F (36 7 °C) 97 9 °F (36 6 °C) 97 6 °F (36 4 °C)   TempSrc: Oral Oral Oral Oral   SpO2: 95% 97% 93% 97%   Weight:       Height:           Telemetry: NSR; Heart Rate: 68    Respiratory:   SpO2: SpO2: 97 %, SpO2 Activity: SpO2 Activity: At Rest, SpO2 Device: O2 Device: None (Room air); Room Air    Intake/Output:   I/O       05/02 0701 - 05/03 0700 05/03 0701 - 05/04 0700 05/04 0701 - 05/05 0700    P  O  400 660     I V  (mL/kg)  319 7 (6 1)     Total Intake(mL/kg) 400 (7 7) 979 7 (18 8)     Urine (mL/kg/hr) 4600 (3 7) 1300 (1)     Stool 0 (0) 0 (0)     Total Output 4600 1300      Net -4200 -320 3             Unmeasured Urine Occurrence 1 x 1 x     Unmeasured Stool Occurrence 1 x 1 x         UOP - unmeasured/8hr; 1300cc/24hrs w/ 1 unmeasured occurance    Stool - 1 over 24hrs    Weights:   Weight (last 2 days)     Date/Time   Weight    05/03/18 0600  52 1 (114 86)    05/02/18 0600  52 7 (116 18)            Admit weight: 53 3kg - no new weight today    Results:   NO NEW CBC OR BMP TODAY; INR PENDING    Results from last 7 days  Lab Units 04/29/18  0820   WBC Thousand/uL 5 30   HEMOGLOBIN g/dL 10 4*   HEMATOCRIT % 32 8*   PLATELETS Thousands/uL 251       Results from last 7 days  Lab Units 04/30/18  0456   SODIUM mmol/L 138   POTASSIUM mmol/L 4 4   CHLORIDE mmol/L 101   CO2 mmol/L 31   BUN mg/dL 17   CREATININE mg/dL 1 22   GLUCOSE RANDOM mg/dL 90   CALCIUM mg/dL 9 1       Results from last 7 days  Lab Units 05/03/18  0605 05/02/18  0652 05/02/18  0514 05/01/18  0525   INR  1 71*  --  1 43* 1 09   PTT seconds 83* 86*  --  71*     Studies:  No new studies    Invasive Lines/Tubes:  Invasive Devices     Peripheral Intravenous Line            Peripheral IV 04/30/18 Right Hand 3 days                Physical Exam:    HEENT/NECK:  PERRLA  No jugular venous distention  Cardiac: Regular rate and rhythm  No rubs/murmurs/gallops  Pulmonary:  Breath sounds slightly diminished at the bases bilaterally  Abdomen:  Non-tender, Non-distended  Positive bowel sounds  Lower extremities: Extremities warm/dry    Radial/PT/DP pulses 2+ bilaterally  Trace edema B/L  Neuro: Alert and oriented X 3  Sensation is grossly intact  No focal deficits  Skin: Warm/Dry, without rashes or lesions  Assessment:  Patient Active Problem List   Diagnosis    Nonrheumatic aortic valve stenosis    Coronary artery disease involving coronary bypass graft of native heart without angina pectoris    [facility]    DM2 (diabetes mellitus, type 2) ([facility] )    Essential hypertension    HLD (hyperlipidemia)    History of ischemic cardiomyopathy    H/O mitral valve replacement with mechanical valve    Aortic valve stenosis       TAVR w/u    Plan:    1  Cardiac:   NSR; HR/BP well-controlled  Isordil 5mg PO Q8    Entresto 24-26mg PO BID  Continue ASA and Statin therapy  ASA/Plavix   INR pending, dose Coumadin 5mg tonight  Continue DVT prophylaxis   All TAVR w/u complete    2  Pulmonary:   Continue Claritin    3  Renal:   Intake/Output net: (-)320 3 mL/24 hours  Continue diuresis   Lasix 40 mg IV QD  Potassium Chloride 20 mEq PO QD    4  Neuro:  Continue Claritin  Continue Meclizine    5  GI:  Tolerating TLC 2 3 gm sodium diet  Continue Metamucil    6  Endo:    Continue vitamin D2    7   Disposition:  Ambulating independently, Anticipate discharge to home once INR therapeutic     VTE Pharmacologic Prophylaxis: Sequential compression device (Venodyne) , Heparin and Warfarin (Coumadin)  VTE Mechanical Prophylaxis: sequential compression device    Collaborative rounds completed with KYE Gómez , and floor, RN    SIGNATURE: Franky Allen PA-C  DATE: May 4, 2018  TIME: 7:34 AM

## 2018-05-19 ENCOUNTER — HOSPITAL ENCOUNTER (INPATIENT)
Facility: HOSPITAL | Age: 73
LOS: 7 days | Discharge: HOME | DRG: 267 | End: 2018-05-26
Attending: THORACIC SURGERY (CARDIOTHORACIC VASCULAR SURGERY) | Admitting: THORACIC SURGERY (CARDIOTHORACIC VASCULAR SURGERY)
Payer: MEDICARE

## 2018-05-19 DIAGNOSIS — Z95.2 S/P TAVR (TRANSCATHETER AORTIC VALVE REPLACEMENT): Primary | ICD-10-CM

## 2018-05-19 DIAGNOSIS — Z95.2 H/O MITRAL VALVE REPLACEMENT WITH MECHANICAL VALVE: ICD-10-CM

## 2018-05-19 DIAGNOSIS — I50.42 CHRONIC COMBINED SYSTOLIC AND DIASTOLIC CONGESTIVE HEART FAILURE (HCC): ICD-10-CM

## 2018-05-19 DIAGNOSIS — I35.0 NONRHEUMATIC AORTIC VALVE STENOSIS: ICD-10-CM

## 2018-05-19 LAB
ANION GAP SERPL CALCULATED.3IONS-SCNC: 5 MMOL/L (ref 4–13)
BASOPHILS # BLD AUTO: 0.03 THOUSANDS/ΜL (ref 0–0.1)
BASOPHILS NFR BLD AUTO: 0 % (ref 0–1)
BUN SERPL-MCNC: 23 MG/DL (ref 5–25)
CALCIUM SERPL-MCNC: 8.8 MG/DL (ref 8.3–10.1)
CHLORIDE SERPL-SCNC: 108 MMOL/L (ref 100–108)
CO2 SERPL-SCNC: 27 MMOL/L (ref 21–32)
CREAT SERPL-MCNC: 1.14 MG/DL (ref 0.6–1.3)
EOSINOPHIL # BLD AUTO: 0.16 THOUSAND/ΜL (ref 0–0.61)
EOSINOPHIL NFR BLD AUTO: 2 % (ref 0–6)
ERYTHROCYTE [DISTWIDTH] IN BLOOD BY AUTOMATED COUNT: 14.3 % (ref 11.6–15.1)
GFR SERPL CREATININE-BSD FRML MDRD: 49 ML/MIN/1.73SQ M
GLUCOSE SERPL-MCNC: 117 MG/DL (ref 65–140)
HCT VFR BLD AUTO: 35 % (ref 34.8–46.1)
HGB BLD-MCNC: 10.8 G/DL (ref 11.5–15.4)
INR PPP: 4.3 (ref 0.86–1.17)
LYMPHOCYTES # BLD AUTO: 1.12 THOUSANDS/ΜL (ref 0.6–4.47)
LYMPHOCYTES NFR BLD AUTO: 16 % (ref 14–44)
MCH RBC QN AUTO: 28.9 PG (ref 26.8–34.3)
MCHC RBC AUTO-ENTMCNC: 30.9 G/DL (ref 31.4–37.4)
MCV RBC AUTO: 94 FL (ref 82–98)
MONOCYTES # BLD AUTO: 0.4 THOUSAND/ΜL (ref 0.17–1.22)
MONOCYTES NFR BLD AUTO: 6 % (ref 4–12)
NEUTROPHILS # BLD AUTO: 5.21 THOUSANDS/ΜL (ref 1.85–7.62)
NEUTS SEG NFR BLD AUTO: 75 % (ref 43–75)
NRBC BLD AUTO-RTO: 0 /100 WBCS
PLATELET # BLD AUTO: 222 THOUSANDS/UL (ref 149–390)
PMV BLD AUTO: 11.4 FL (ref 8.9–12.7)
POTASSIUM SERPL-SCNC: 3.9 MMOL/L (ref 3.5–5.3)
PROTHROMBIN TIME: 41.2 SECONDS (ref 11.8–14.2)
RBC # BLD AUTO: 3.74 MILLION/UL (ref 3.81–5.12)
SODIUM SERPL-SCNC: 140 MMOL/L (ref 136–145)
WBC # BLD AUTO: 6.93 THOUSAND/UL (ref 4.31–10.16)

## 2018-05-19 PROCEDURE — 80048 BASIC METABOLIC PNL TOTAL CA: CPT | Performed by: PHYSICIAN ASSISTANT

## 2018-05-19 PROCEDURE — 85610 PROTHROMBIN TIME: CPT | Performed by: PHYSICIAN ASSISTANT

## 2018-05-19 PROCEDURE — 85025 COMPLETE CBC W/AUTO DIFF WBC: CPT | Performed by: PHYSICIAN ASSISTANT

## 2018-05-19 RX ORDER — ASPIRIN 81 MG/1
81 TABLET, CHEWABLE ORAL DAILY
Status: DISCONTINUED | OUTPATIENT
Start: 2018-05-19 | End: 2018-05-22 | Stop reason: HOSPADM

## 2018-05-19 RX ORDER — LOPERAMIDE HYDROCHLORIDE 2 MG/1
4 CAPSULE ORAL 4 TIMES DAILY PRN
Status: DISCONTINUED | OUTPATIENT
Start: 2018-05-19 | End: 2018-05-19

## 2018-05-19 RX ORDER — NITROGLYCERIN 0.6 MG/1
0.6 TABLET SUBLINGUAL
COMMUNITY
End: 2018-05-26 | Stop reason: HOSPADM

## 2018-05-19 RX ORDER — DILTIAZEM HYDROCHLORIDE 60 MG/1
120 TABLET, FILM COATED ORAL 4 TIMES DAILY
Status: CANCELLED | OUTPATIENT
Start: 2018-05-19

## 2018-05-19 RX ORDER — DIPHENOXYLATE HYDROCHLORIDE AND ATROPINE SULFATE 2.5; .025 MG/1; MG/1
1 TABLET ORAL 4 TIMES DAILY PRN
Status: DISCONTINUED | OUTPATIENT
Start: 2018-05-19 | End: 2018-05-19

## 2018-05-19 RX ORDER — MECLIZINE HCL 12.5 MG/1
12.5 TABLET ORAL DAILY
Status: CANCELLED | OUTPATIENT
Start: 2018-05-19

## 2018-05-19 RX ORDER — GABAPENTIN 100 MG/1
100 CAPSULE ORAL
Status: DISCONTINUED | OUTPATIENT
Start: 2018-05-19 | End: 2018-05-19

## 2018-05-19 RX ORDER — LORATADINE 10 MG/1
10 TABLET ORAL DAILY
Status: CANCELLED | OUTPATIENT
Start: 2018-05-19

## 2018-05-19 RX ORDER — MECLIZINE HCL 12.5 MG/1
12.5 TABLET ORAL DAILY
Status: DISCONTINUED | OUTPATIENT
Start: 2018-05-19 | End: 2018-05-19

## 2018-05-19 RX ORDER — PRAVASTATIN SODIUM 40 MG
40 TABLET ORAL
Status: DISCONTINUED | OUTPATIENT
Start: 2018-05-19 | End: 2018-05-22

## 2018-05-19 RX ORDER — ISOSORBIDE DINITRATE 10 MG/1
5 TABLET ORAL EVERY 8 HOURS
Status: DISCONTINUED | OUTPATIENT
Start: 2018-05-19 | End: 2018-05-19

## 2018-05-19 RX ORDER — CLOPIDOGREL BISULFATE 75 MG/1
75 TABLET ORAL DAILY
Status: CANCELLED | OUTPATIENT
Start: 2018-05-19

## 2018-05-19 RX ORDER — LORATADINE 10 MG/1
10 TABLET ORAL DAILY
Status: DISCONTINUED | OUTPATIENT
Start: 2018-05-19 | End: 2018-05-19

## 2018-05-19 RX ORDER — CARVEDILOL 3.12 MG/1
3.12 TABLET ORAL 2 TIMES DAILY WITH MEALS
Status: ON HOLD | COMMUNITY
End: 2018-05-19 | Stop reason: ALTCHOICE

## 2018-05-19 RX ORDER — MELATONIN
2000 DAILY
Status: CANCELLED | OUTPATIENT
Start: 2018-05-19

## 2018-05-19 RX ORDER — GABAPENTIN 100 MG/1
100 CAPSULE ORAL
Status: CANCELLED | OUTPATIENT
Start: 2018-05-19

## 2018-05-19 RX ORDER — ASPIRIN 81 MG/1
81 TABLET, CHEWABLE ORAL DAILY
Status: CANCELLED | OUTPATIENT
Start: 2018-05-19

## 2018-05-19 RX ORDER — MELOXICAM 7.5 MG/1
7.5 TABLET ORAL 2 TIMES DAILY PRN
Status: CANCELLED | OUTPATIENT
Start: 2018-05-19

## 2018-05-19 RX ORDER — ISOSORBIDE DINITRATE 10 MG/1
5 TABLET ORAL EVERY 8 HOURS
Status: CANCELLED | OUTPATIENT
Start: 2018-05-19

## 2018-05-19 RX ORDER — TRAMADOL HYDROCHLORIDE 50 MG/1
50 TABLET ORAL AS NEEDED
COMMUNITY

## 2018-05-19 RX ORDER — TRAMADOL HYDROCHLORIDE 50 MG/1
50 TABLET ORAL EVERY 6 HOURS PRN
Status: DISCONTINUED | OUTPATIENT
Start: 2018-05-19 | End: 2018-05-22 | Stop reason: HOSPADM

## 2018-05-19 RX ORDER — CLOPIDOGREL BISULFATE 75 MG/1
75 TABLET ORAL DAILY
Status: DISCONTINUED | OUTPATIENT
Start: 2018-05-19 | End: 2018-05-22 | Stop reason: HOSPADM

## 2018-05-19 RX ORDER — FUROSEMIDE 40 MG/1
40 TABLET ORAL DAILY
Status: CANCELLED | OUTPATIENT
Start: 2018-05-19

## 2018-05-19 RX ORDER — PANTOPRAZOLE SODIUM 40 MG/1
40 TABLET, DELAYED RELEASE ORAL DAILY
Status: DISCONTINUED | OUTPATIENT
Start: 2018-05-19 | End: 2018-05-22 | Stop reason: HOSPADM

## 2018-05-19 RX ORDER — DILTIAZEM HYDROCHLORIDE 60 MG/1
120 TABLET, FILM COATED ORAL 4 TIMES DAILY
Status: DISCONTINUED | OUTPATIENT
Start: 2018-05-19 | End: 2018-05-19

## 2018-05-19 RX ORDER — CARVEDILOL 3.12 MG/1
3.12 TABLET ORAL 2 TIMES DAILY WITH MEALS
Status: DISCONTINUED | OUTPATIENT
Start: 2018-05-19 | End: 2018-05-22

## 2018-05-19 RX ORDER — PANTOPRAZOLE SODIUM 40 MG/1
40 TABLET, DELAYED RELEASE ORAL DAILY
Status: CANCELLED | OUTPATIENT
Start: 2018-05-19

## 2018-05-19 RX ORDER — MELATONIN
2000 DAILY
Status: DISCONTINUED | OUTPATIENT
Start: 2018-05-19 | End: 2018-05-22 | Stop reason: HOSPADM

## 2018-05-19 RX ORDER — FUROSEMIDE 40 MG/1
40 TABLET ORAL DAILY
Status: DISCONTINUED | OUTPATIENT
Start: 2018-05-19 | End: 2018-05-22 | Stop reason: HOSPADM

## 2018-05-19 RX ORDER — NITROGLYCERIN 0.4 MG/1
0.6 TABLET SUBLINGUAL
Status: DISCONTINUED | OUTPATIENT
Start: 2018-05-19 | End: 2018-05-22 | Stop reason: HOSPADM

## 2018-05-19 RX ADMIN — PRAVASTATIN SODIUM 40 MG: 40 TABLET ORAL at 22:03

## 2018-05-19 RX ADMIN — SACUBITRIL AND VALSARTAN 1 TABLET: 24; 26 TABLET, FILM COATED ORAL at 17:18

## 2018-05-19 RX ADMIN — CARVEDILOL 3.12 MG: 3.12 TABLET, FILM COATED ORAL at 17:18

## 2018-05-19 RX ADMIN — TRAMADOL HYDROCHLORIDE 50 MG: 50 TABLET, FILM COATED ORAL at 13:49

## 2018-05-19 RX ADMIN — TRAMADOL HYDROCHLORIDE 50 MG: 50 TABLET, FILM COATED ORAL at 22:03

## 2018-05-19 RX ADMIN — MUPIROCIN 1 APPLICATION: 20 OINTMENT TOPICAL at 20:31

## 2018-05-19 NOTE — PLAN OF CARE
Problem: Potential for Falls  Goal: Patient will remain free of falls  INTERVENTIONS:  - Assess patient frequently for physical needs  -  Identify cognitive and physical deficits and behaviors that affect risk of falls    -  [place] fall precautions as indicated by assessment   - Educate patient/family on patient safety including physical limitations  - Instruct patient to call for assistance with activity based on assessment  - Modify environment to reduce risk of injury  - Consider OT/PT consult to assist with strengthening/mobility   Outcome: Progressing      Problem: PAIN - ADULT  Goal: Verbalizes/displays adequate comfort level or baseline comfort level  Interventions:  - Encourage patient to monitor pain and request assistance  - Assess pain using appropriate pain scale  - Administer analgesics based on type and severity of pain and evaluate response  - Implement non-pharmacological measures as appropriate and evaluate response  - Consider cultural and social influences on pain and pain management  - Notify physician/advanced practitioner if interventions unsuccessful or patient reports new pain  Outcome: Progressing      Problem: INFECTION - ADULT  Goal: Absence or prevention of progression during hospitalization  INTERVENTIONS:  - Assess and monitor for signs and symptoms of infection  - Monitor lab/diagnostic results  - Monitor all insertion sites, i e  indwelling lines, tubes, and drains  - Monitor endotracheal (as able) and nasal secretions for changes in amount and color  - [place] appropriate cooling/warming therapies per order  - Administer medications as ordered  - Instruct and encourage patient and family to use good hand hygiene technique  - Identify and instruct in appropriate isolation precautions for identified infection/condition  Outcome: Progressing    Goal: Absence of fever/infection during neutropenic period  INTERVENTIONS:  - Monitor WBC  - Implement neutropenic guidelines  Outcome: Progressing      Problem: SAFETY ADULT  Goal: Maintain or return to baseline ADL function  INTERVENTIONS:  -  Assess patient's ability to carry out ADLs; assess patient's baseline for ADL function and identify physical deficits which impact ability to perform ADLs (bathing, care of mouth/teeth, toileting, grooming, dressing, etc )  - Assess/evaluate cause of self-care deficits   - Assess range of motion  - Assess patient's mobility; develop plan if impaired  - Assess patient's need for assistive devices and provide as appropriate  - Encourage maximum independence but intervene and supervise when necessary  ¯ Involve family in performance of ADLs  ¯ Assess for home care needs following discharge   ¯ Request OT consult to assist with ADL evaluation and planning for discharge  ¯ Provide patient education as appropriate  Outcome: Progressing

## 2018-05-20 LAB
ANION GAP SERPL CALCULATED.3IONS-SCNC: 5 MMOL/L (ref 4–13)
BUN SERPL-MCNC: 20 MG/DL (ref 5–25)
CALCIUM SERPL-MCNC: 8.7 MG/DL (ref 8.3–10.1)
CHLORIDE SERPL-SCNC: 107 MMOL/L (ref 100–108)
CO2 SERPL-SCNC: 27 MMOL/L (ref 21–32)
CREAT SERPL-MCNC: 1 MG/DL (ref 0.6–1.3)
ERYTHROCYTE [DISTWIDTH] IN BLOOD BY AUTOMATED COUNT: 14.2 % (ref 11.6–15.1)
GFR SERPL CREATININE-BSD FRML MDRD: 57 ML/MIN/1.73SQ M
GLUCOSE SERPL-MCNC: 100 MG/DL (ref 65–140)
HCT VFR BLD AUTO: 31.3 % (ref 34.8–46.1)
HGB BLD-MCNC: 9.9 G/DL (ref 11.5–15.4)
INR PPP: 3.45 (ref 0.86–1.17)
MCH RBC QN AUTO: 29.4 PG (ref 26.8–34.3)
MCHC RBC AUTO-ENTMCNC: 31.6 G/DL (ref 31.4–37.4)
MCV RBC AUTO: 93 FL (ref 82–98)
PLATELET # BLD AUTO: 196 THOUSANDS/UL (ref 149–390)
PMV BLD AUTO: 11.4 FL (ref 8.9–12.7)
POTASSIUM SERPL-SCNC: 3.7 MMOL/L (ref 3.5–5.3)
PROTHROMBIN TIME: 34.7 SECONDS (ref 11.8–14.2)
RBC # BLD AUTO: 3.37 MILLION/UL (ref 3.81–5.12)
SODIUM SERPL-SCNC: 139 MMOL/L (ref 136–145)
WBC # BLD AUTO: 7.05 THOUSAND/UL (ref 4.31–10.16)

## 2018-05-20 PROCEDURE — 80048 BASIC METABOLIC PNL TOTAL CA: CPT | Performed by: PHYSICIAN ASSISTANT

## 2018-05-20 PROCEDURE — 85027 COMPLETE CBC AUTOMATED: CPT | Performed by: PHYSICIAN ASSISTANT

## 2018-05-20 PROCEDURE — 99231 SBSQ HOSP IP/OBS SF/LOW 25: CPT | Performed by: THORACIC SURGERY (CARDIOTHORACIC VASCULAR SURGERY)

## 2018-05-20 PROCEDURE — 85610 PROTHROMBIN TIME: CPT | Performed by: PHYSICIAN ASSISTANT

## 2018-05-20 PROCEDURE — 99222 1ST HOSP IP/OBS MODERATE 55: CPT | Performed by: INTERNAL MEDICINE

## 2018-05-20 RX ADMIN — ASPIRIN 81 MG 81 MG: 81 TABLET ORAL at 08:16

## 2018-05-20 RX ADMIN — SACUBITRIL AND VALSARTAN 1 TABLET: 24; 26 TABLET, FILM COATED ORAL at 08:17

## 2018-05-20 RX ADMIN — TRAMADOL HYDROCHLORIDE 50 MG: 50 TABLET, FILM COATED ORAL at 18:37

## 2018-05-20 RX ADMIN — TRAMADOL HYDROCHLORIDE 50 MG: 50 TABLET, FILM COATED ORAL at 08:16

## 2018-05-20 RX ADMIN — MUPIROCIN 1 APPLICATION: 20 OINTMENT TOPICAL at 08:16

## 2018-05-20 RX ADMIN — MUPIROCIN 1 APPLICATION: 20 OINTMENT TOPICAL at 21:27

## 2018-05-20 RX ADMIN — SACUBITRIL AND VALSARTAN 1 TABLET: 24; 26 TABLET, FILM COATED ORAL at 17:44

## 2018-05-20 RX ADMIN — CARVEDILOL 3.12 MG: 3.12 TABLET, FILM COATED ORAL at 08:16

## 2018-05-20 RX ADMIN — CLOPIDOGREL BISULFATE 75 MG: 75 TABLET, FILM COATED ORAL at 08:16

## 2018-05-20 RX ADMIN — PRAVASTATIN SODIUM 40 MG: 40 TABLET ORAL at 21:27

## 2018-05-20 RX ADMIN — FUROSEMIDE 40 MG: 40 TABLET ORAL at 08:16

## 2018-05-20 RX ADMIN — VITAMIN D, TAB 1000IU (100/BT) 2000 UNITS: 25 TAB at 08:16

## 2018-05-20 RX ADMIN — PANTOPRAZOLE SODIUM 40 MG: 40 TABLET, DELAYED RELEASE ORAL at 08:16

## 2018-05-20 NOTE — PROGRESS NOTES
Progress Note - Cardiothoracic Surgery   Tammi Carrera 81 y o  female MRN: [id number]  Unit/Bed#: [facility] 408-01 Encounter: [id number]      24 Hour Events: No overnight events/complaints this morning  Denies angina/dyspnea  INR remains therapeutic, will not start IV heparin until INR <2 5  Ready for TAVR on Tuesday      Medications:   Scheduled Meds:  Current Facility-Administered Medications:  aspirin 81 mg Oral Daily Timothy Merritt PA-C   carvedilol 3 125 mg Oral BID With Meals Timothy Merritt PA-C   cholecalciferol 2,000 Units Oral Daily Timothy Merritt PA-C   clopidogrel 75 mg Oral Daily Timothy Merritt PA-C   furosemide 40 mg Oral Daily Timothy Merritt PA-C   mupirocin  Nasal Q12H [facility] Timothy Merritt PA-C   nitroglycerin 0 6 mg Sublingual Q5 Min PRN Timothy Merritt PA-C   pantoprazole 40 mg Oral Daily Timothy Merritt PA-C   pravastatin 40 mg Oral HS Timothy Merritt PA-C   sacubitril-valsartan 1 tablet Oral BID Timothy Merritt PA-C   traMADol 50 mg Oral Q6H PRN Timothy Merritt PA-C     Continuous Infusions:     Results:     Results from last 7 days  Lab Units 05/20/18  0512 05/19/18  1106   WBC Thousand/uL 7 05 6 93   HEMOGLOBIN g/dL 9 9* 10 8*   HEMATOCRIT % 31 3* 35 0   PLATELETS Thousands/uL 196 222       Results from last 7 days  Lab Units 05/20/18  0512 05/19/18   SODIUM mmol/L 139 140   POTASSIUM mmol/L 3 7 3 9   CHLORIDE mmol/L 107 108   CO2 mmol/L 27 27   BUN mg/dL 20 23   CREATININE mg/dL 1 00 1 14   GLUCOSE RANDOM mg/dL 100 117   CALCIUM mg/dL 8 7 8 8       Results from last 7 days  Lab Units 05/20/18  0512 05/19/18  1048   INR  3 45* 4 30*         Vitals:   Vitals:    05/19/18 2100 05/19/18 2318 05/20/18 0257 05/20/18 0511   BP: 104/51 99/50 100/52    BP Location: Left arm Left arm Left arm    Pulse: 69 71 72    Resp: 16 17 18    Temp: 98 5 °F (36 9 °C) 98 °F (36 7 °C) 98 °F (36 7 °C)    TempSrc: Oral Oral Oral    SpO2: 95% 97% 95%    Weight:    53 8 kg (118 lb 9 6 oz) Height:           Physical Exam:  HEENT/NECK:  PERRLA  No jugular venous distention  Cardiac: Regular rate and rhythm, 4/6 systolic Murmur  Pulmonary:  Breath clear bilaterally  Abdomen:  Non-tender, Non-distended  Positive bowel sounds  Lower extremities: Extremities warm/dry  Radial/PT/DP pulses 2+ bilaterally  No edema B/L  Neuro: Alert and oriented X 3  Sensation is grossly intact  No focal deficits  Skin: Warm/Dry, without rashes or lesions  Assessment:  Patient Active Problem List   Diagnosis    Nonrheumatic aortic valve stenosis    Coronary artery disease involving coronary bypass graft of native heart without angina pectoris    Chronic CH[facility] (congestive heart failure) [facility]    DM[facility], type 2) [facility]    Essential hypertension    HLD (hyperlipidemia)    History of ischemic cardiomyopathy    H/O mitral valve replacement with mechanical valve    Aortic valve stenosis     Severe aortic stenosis; TAVR scheduled     Plan:  INR 3 45  Will start IV heparin when INR <2 45  Continue to monitor INR dialy  Patient agreeable to proceed with surgery;  Informed consent signed  Continue Mupirocin 2% nasal ointment q 12 hrs  Continue topical chlorhexidine bath and mouth rise  Preoperative oxygen, beta blocker, insulin, and antibiotics ordered transcatheter aortic valve replacement scheduled for Tuesday with BENJI Milan Baseman: Cm Patricio PA-C  DATE: May 20, 2018  TIME: 6:17 AM

## 2018-05-20 NOTE — CASE MANAGEMENT
Initial Clinical Review    Admission: Date/Time/Statement: 5/19/18 @ 1018     Orders Placed This Encounter   Procedures    Inpatient Admission     Standing Status:   Standing     Number of Occurrences:   1     Order Specific Question:   Admitting Physician     Answer:   Walter Pearson     Order Specific Question:   Level of Care     Answer:   Med Surg [16]     Order Specific Question:   Estimated length of stay     Answer:   More than 2 Midnights     Order Specific Question:   Certification     Answer:   I certify that inpatient services are medically necessary for this patient for a duration of greater than two midnights  See H&P and MD Progress Notes for additional information about the patient's course of treatment  ED: Date/Time/Mode of Arrival:   ED Arrival Information     Patient not seen in ED                       Chief Complaint: No chief complaint on file  History of Illness:    Ivan Saleh is a 81y o  year old female who is awaiting TAVR on 5/22  She was admitted for anticipation of IV heparin bridging on CT surgical service  Her INR does not require at this time  She is followed by Dr Jeana Kruse at  [place]  She has had progressive dyspnea  She has previously undergone open heart surgery including CABG/MV repair/ring 2007  Subsequently had redo sternotomy with mechanical MVR ( 25 mm mechanical [facility]  [employer] valve)  She does have multiple cardiac stents and most recent SVG LAD PCI 4/2/2018  She remains on Plavix  She has residual 75% lesion in the proximal ramus  Additionally she has an ischemic cardiomyopathy/ chronic systolic heart failure- with ejection fraction most recently 45%  She has previously undergone [employer] permanent pacemaker for sick sinus syndrome  She also cerebral vascular disease with bilateral ICA stenosis and chronic LBBB       ED Vital Signs:   ED Triage Vitals   Temperature Pulse Respirations Blood Pressure SpO2   05/19/18 1130 05/19/18 1130 05/19/18 1130 05/19/18 1130 05/19/18 1130   97 9 °F (36 6 °C) 69 18 135/59 99 %      Temp Source Heart Rate Source Patient Position - Orthostatic VS BP Location FiO2 (%)   05/19/18 1130 05/19/18 2318 05/19/18 1130 05/19/18 1130 --   Oral Monitor Sitting Left arm       Pain Score       05/19/18 1130       No Pain        Wt Readings from Last 1 Encounters:   05/20/18 53 8 kg (118 lb 9 6 oz)       Vital Signs (abnormal):   above    Abnormal Labs/Diagnostic Test Results: PT    41 2      INR  4 30    ED Treatment:   Medication Administration - No Administrations Displayed (No Start Event Found)     None          Past Medical/Surgical History: Active Ambulatory Problems     Diagnosis Date Noted    Nonrheumatic aortic valve stenosis     Coronary artery disease involving coronary bypass graft of native heart without angina pectoris     Chronic [facility]estive [facility]) [facility]     DM2 (diabetes mellitus, type 2) ([facility])     Essential hypertension     HLD (hyperlipidemia)     History of ischemic cardiomyopathy     H/O mitral valve replacement with mechanical valve      Resolved Ambulatory Problems     Diagnosis Date Noted    No Resolved Ambulatory Problems     Past Medical History:   Diagnosis Date    Aortic valvar stenosis     CAD (coronary artery disease) of bypass graft     Carotid stenosis, asymptomatic, bilateral 04/2018    [facility]estive [facility]) [facility]     DM2 (diabetes mellitus, type 2) [facility]     H/O Hodgkin's lymphoma     History of cervical cancer     History of ischemic cardiomyopathy     History of uterine cancer     HLD (hyperlipidemia)     HTN (hypertension)     LBBB (left bundle branch block)     Mitral regurgitation     PAD (peripheral artery disease) ([facility]     Renal artery stenosis ([facility])     SSS (sick sinus syndrome) ([facility])        Admitting Diagnosis: Aortic valve stenosis, etiology of cardiac valve disease unspecified [I35 0]    Age/Sex: 81 y o  female    Assessment/Plan:     Preop TAVR- scheduled 5/22  Severe symptomatic AS     2  Redo sternotomy/mechanical MVR  IV heparin bridging once INR below 2 5  INR 3 45      3  CAD prior CABG/ MV repair repair- subsequently mechanical MVR   PCI / MAURICE to SVG LAD 4/2/2018  Residual 75% prox ramus  Asa 81, plavix 75, BB, statin     4  ICMP/chronic systolic heart failure  EF most recently 45%  Coreg/entresto/oral lasix     5  SSS/ [facility]  [employer] [facility] PPM     6  HTN- stable  Principal Problem: Aortic valve stenosis  Active Problems:    Nonrheumatic aortic valve stenosis    Coronary artery disease involving coronary bypass graft of native heart without angina pectoris    Chronic CH[facility] (congestive heart failure) ([facility]    DM2 (diabetes mellitus, type 2) ([facility])    Essential hypertension    HLD (hyperlipidemia)    History of ischemic cardiomyopathy    H/O mitral valve replacement with mechanical valve          Admission Orders:   IP     5/19  @    1036  Scheduled Meds:   Current Facility-Administered Medications:  aspirin 81 mg Oral Daily Timothy Merritt PA-C   carvedilol 3 125 mg Oral BID With Meals Timothy Merritt PA-C   cholecalciferol 2,000 Units Oral Daily Timothy Merritt PA-C   clopidogrel 75 mg Oral Daily Timothy Merritt PA-C   furosemide 40 mg Oral Daily Timothy Merritt PA-C   mupirocin  Nasal Q12H [address] Timothy Merritt PA-C   nitroglycerin 0 6 mg Sublingual Q5 Min PRN Timothy Merritt PA-C   pantoprazole 40 mg Oral Daily Timothy Merritt PA-C   pravastatin 40 mg Oral HS Timothy Merritt PA-C   sacubitril-valsartan 1 tablet Oral BID Timothy Merritt PA-C   traMADol 50 mg Oral Q6H PRN Timothy Merritt PA-C     Continuous Infusions:    PRN Meds: nitroglycerin    traMADol     Reg diet  Cons cardiology    PROGRESS  NOTE   5/20  INR 3 45  Will start IV heparin when INR <2 45  Continue to monitor INR dialy  Patient agreeable to proceed with surgery;  Informed consent signed  Continue Mupirocin 2% nasal ointment q 12 hrs  Continue topical chlorhexidine bath and mouth rise  Preoperative oxygen, beta blocker, insulin, and antibiotics ordered transcatheter aortic valve replacement scheduled for Tuesday with

## 2018-05-20 NOTE — CONSULTS
Consultation - Cardiology   Bridgett Jurado 81 y o  female MRN: [id number]  Unit/Bed#: [facility] 408-01 Encounter: [id number]    Assessment/Plan     Principal Problem: Aortic valve stenosis  Active Problems:    Nonrheumatic aortic valve stenosis    Coronary artery disease involving coronary bypass graft of native heart without angina pectoris    Chronic CHF (congestive heart failure) ([facility])    DM2 (diabetes mellitus, type 2) ([facility])    Essential hypertension    HLD (hyperlipidemia)    History of ischemic cardiomyopathy    H/O mitral valve replacement with mechanical valve      Assessment/Plan    1  Preop TAVR- scheduled 5/22  Severe symptomatic AS    2  Redo sternotomy/mechanical MVR  IV heparin bridging once INR below 2 5  INR 3 45     3  CAD prior CABG/ MV repair repair- subsequently mechanical MVR   PCI / MAURICE to SVG LAD 4/2/2018  Residual 75% prox ramus  Asa 81, plavix 75, BB, statin    4  ICMP/chronic systolic heart failure  EF most recently 45%  Coreg/entresto/oral lasix    5  SSS/ [facility] PPM    6  HTN- stable      History of Present Illness   Physician Requesting Consult: Bina Armenta DO  Reason for Consult / Principal Problem: pre TAVR IV heparin bridging    HPI: Bridgett Jurado is a 81y o  year old female who is awaiting TAVR on 5/22  She was admitted for anticipation of IV heparin bridging on CT surgical service  Her INR does not require at this time  She is followed by Dr Abelardo Rosenthal at  [place]  She has had progressive dyspnea  She has previously undergone open heart surgery including CABG/MV repair/ring 2007  Subsequently had redo sternotomy with mechanical MVR ( 25 mm mechanical [facility] valve)  She does have multiple cardiac stents and most recent SVG LAD PCI 4/2/2018  She remains on Plavix  She has residual 75% lesion in the proximal ramus  Additionally she has an ischemic cardiomyopathy/ chronic systolic heart failure- with ejection fraction most recently 45%    She has previously undergone [facility] permanent pacemaker for sick sinus syndrome  She also cerebral vascular disease with bilateral ICA stenosis and chronic LBBB  Inpatient consult to Cardiology  Consult performed by: Mariama Hendrix ordered by: Bryce Centeno          Review of Systems    Historical Information   Past Medical History:   Diagnosis Date    Aortic valvar stenosis     CAD (coronary artery disease) of bypass graft     Carotid stenosis, asymptomatic, bilateral 04/2018    50-69% b/l    [facility]est[facility]     DM2 (diabetes mellitus, type 2) ([facility]     H/O Hodgkin's lymphoma     History of cervical cancer     History of ischemic cardiomyopathy     History of uterine cancer     HLD (hyperlipidemia)     HTN (hypertension)     LBBB (left bundle branch block)     Mitral regurgitation     PAD [facility]peripheral artery disease) [facility])     Renal artery stenosis (HCC)     s/p renal artery stent    [facility]     s/p ppm     Past Surgical History:   Procedure Laterality Date    APPENDECTOMY      CARDIAC PACEMAKER PLACEMENT      CARDIAC SURGERY  09/2007    MV Repair, CABG x 2 by Dr Brodie Mauro @ [address] [place] [address]  01/2008    Redo Sternotomy, MVR #25mm [facility] Mechanical    CHOLECYSTECTOMY      COLECTOMY      RENAL ARTERY STENT      TONSILLECTOMY       History   Alcohol Use No     History   Drug Use No     History   Smoking Status    Former Smoker    Years: 15 00    Types: Cigarettes    Quit date: 2016   Smokeless Tobacco    Never Used     Family History: No family history on file      Meds/Allergies   current meds:   Current Facility-Administered Medications   Medication Dose Route Frequency    aspirin chewable tablet 81 mg  81 mg Oral Daily    carvedilol (COREG) tablet 3 125 mg  3 125 mg Oral BID With Meals    cholecalciferol (VITAMIN D3) tablet 2,000 Units  2,000 Units Oral Daily    clopidogrel (PLAVIX) tablet 75 mg  75 mg Oral Daily    furosemide (LASIX) tablet 40 mg  40 mg Oral Daily    mupirocin (BACTROBAN) 2 % nasal ointment   Nasal Q12H [address]    nitroglycerin (NITROSTAT) SL tablet 0 6 mg  0 6 mg Sublingual Q5 Min PRN    pantoprazole (PROTONIX) EC tablet 40 mg  40 mg Oral Daily    pravastatin (PRAVACHOL) tablet 40 mg  40 mg Oral HS    sacubitril-valsartan (ENTRESTO) 24-26 MG per tablet 1 tablet  1 tablet Oral BID    traMADol (ULTRAM) tablet 50 mg  50 mg Oral Q6H PRN    and PTA meds:  Prescriptions Prior to Admission   Medication    aspirin 81 mg chewable tablet    cholecalciferol (VITAMIN D3) 1,000 units tablet    clopidogrel (PLAVIX) 75 mg tablet    diphenoxylate-atropine (LOMOTIL) 2 5-0 025 mg per tablet    furosemide (LASIX) 40 mg tablet    loperamide (IMODIUM) 2 mg capsule    nitroglycerin (NITROSTAT) 0 6 mg SL tablet    pantoprazole (PROTONIX) 40 mg tablet    pravastatin (PRAVACHOL) 20 mg tablet    traMADol (ULTRAM) 50 mg tablet    warfarin (COUMADIN) 2 mg tablet     Allergies   Allergen Reactions    Contrast [Iodinated Diagnostic Agents] Shortness Of Breath and Throat Swelling    Ranolazine Shortness Of Breath and Vomiting     ranexa    Codeine GI Intolerance     vomit    Penicillin G Hives    Penicillins Throat Swelling    Sulfa Antibiotics GI Intolerance and Throat Swelling       Objective   Vitals: Blood pressure 101/50, pulse 75, temperature 98 2 °F (36 8 °C), temperature source Oral, resp  rate 18, height 4' 11" (1 499 m), weight 53 8 kg (118 lb 9 6 oz), SpO2 92 %    Orthostatic Blood Pressures      Most Recent Value   Blood Pressure  101/50 [Map 04] filed at 05/20/2018 0759   Patient Position - Orthostatic VS  Sitting filed at 05/20/2018 0759            Intake/Output Summary (Last 24 hours) at 05/20/18 0923  Last data filed at 05/20/18 0803   Gross per 24 hour   Intake             1210 ml   Output              500 ml   Net              710 ml       Invasive Devices     Peripheral Intravenous Line            Peripheral IV 05/19/18 Right Antecubital less than 1 day                Physical Exam: /50 (BP Location: Left arm) Comment: Map 65  Pulse 75   Temp 98 2 °F (36 8 °C) (Oral)   Resp 18   Ht 4' 11" (1 499 m)   Wt 53 8 kg (118 lb 9 6 oz)   SpO2 92%   BMI 23 95 kg/m²   General Appearance:    Alert, cooperative, no distress, appears stated age   Head:    Normocephalic, no scleral icterus   Eyes:    PERRL   Nose:   Nares normal, septum midline, mucosa normal, no drainage    Throat:   Lips, mucosa, and tongue normal   Neck:   Supple, symmetrical, trachea midline        Back:     Symmetric   Lungs:     Clear to auscultation bilaterally, respirations unlabored        Heart:    Regular rate and rhythm, S1 and S2 normal, + click, JASMIN   Abdomen:     Soft, non-tender, bowel sounds active all four quadrants,        Extremities:   Extremities normal, atraumatic, no cyanosis or edema   Pulses:   2+ and symmetric all extremities   Skin:   Skin color, texture, turgor normal, no rashes or lesions   Neurologic:   Alert and oriented to person place and time   No focal deficits       Lab Results:   Recent Results (from the past 72 hour(s))   Basic metabolic panel    Collection Time: 05/19/18 12:00 AM   Result Value Ref Range    Sodium 140 136 - 145 mmol/L    Potassium 3 9 3 5 - 5 3 mmol/L    Chloride 108 100 - 108 mmol/L    CO2 27 21 - 32 mmol/L    Anion Gap 5 4 - 13 mmol/L    BUN 23 5 - 25 mg/dL    Creatinine 1 14 0 60 - 1 30 mg/dL    Glucose 117 65 - 140 mg/dL    Calcium 8 8 8 3 - 10 1 mg/dL    eGFR 49 ml/min/1 73sq m   Protime-INR    Collection Time: 05/19/18 10:48 AM   Result Value Ref Range    Protime 41 2 (H) 11 8 - 14 2 seconds    INR 4 30 (H) 0 86 - 1 17   CBC and differential    Collection Time: 05/19/18 11:06 AM   Result Value Ref Range    WBC 6 93 4 31 - 10 16 Thousand/uL    RBC 3 74 (L) 3 81 - 5 12 Million/uL    Hemoglobin 10 8 (L) 11 5 - 15 4 g/dL    Hematocrit 35 0 34 8 - 46 1 %    MCV 94 82 - 98 fL    MCH 28 9 26 8 - 34 3 pg MCHC 30 9 (L) 31 4 - 37 4 g/dL    RDW 14 3 11 6 - 15 1 %    MPV 11 4 8 9 - 12 7 fL    Platelets 070 226 - 554 Thousands/uL    nRBC 0 /100 WBCs    Neutrophils Relative 75 43 - 75 %    Lymphocytes Relative 16 14 - 44 %    Monocytes Relative 6 4 - 12 %    Eosinophils Relative 2 0 - 6 %    Basophils Relative 0 0 - 1 %    Neutrophils Absolute 5 21 1 85 - 7 62 Thousands/µL    Lymphocytes Absolute 1 12 0 60 - 4 47 Thousands/µL    Monocytes Absolute 0 40 0 17 - 1 22 Thousand/µL    Eosinophils Absolute 0 16 0 00 - 0 61 Thousand/µL    Basophils Absolute 0 03 0 00 - 0 10 Thousands/µL   CBC    Collection Time: 18  5:12 AM   Result Value Ref Range    WBC 7 05 4 31 - 10 16 Thousand/uL    RBC 3 37 (L) 3 81 - 5 12 Million/uL    Hemoglobin 9 9 (L) 11 5 - 15 4 g/dL    Hematocrit 31 3 (L) 34 8 - 46 1 %    MCV 93 82 - 98 fL    MCH 29 4 26 8 - 34 3 pg    MCHC 31 6 31 4 - 37 4 g/dL    RDW 14 2 11 6 - 15 1 %    Platelets 944 102 - 453 Thousands/uL    MPV 11 4 8 9 - 12 7 fL   Basic metabolic panel    Collection Time: 18  5:12 AM   Result Value Ref Range    Sodium 139 136 - 145 mmol/L    Potassium 3 7 3 5 - 5 3 mmol/L    Chloride 107 100 - 108 mmol/L    CO2 27 21 - 32 mmol/L    Anion Gap 5 4 - 13 mmol/L    BUN 20 5 - 25 mg/dL    Creatinine 1 00 0 60 - 1 30 mg/dL    Glucose 100 65 - 140 mg/dL    Calcium 8 7 8 3 - 10 1 mg/dL    eGFR 57 ml/min/1 73sq m   Protime-INR    Collection Time: 18  5:12 AM   Result Value Ref Range    Protime 34 7 (H) 11 8 - 14 2 seconds    INR 3 45 (H) 0 86 - 1 17     [address]  [phone]     Transesophageal Echocardiogram  2D, 3D, M-mode, Doppler, and Color Doppler     Study date:  2018     Patient: Marilou Akers  MR number: [id number]  Account number: [de-identified]  : 1945  Age: 81 years  Gender: Female  Status: Inpatient  Location: Echo lab  Height: 59 in  Weight: 116 lb  BP: 106/ 50 mmHg     Indications: Aortic stenosis      Diagnoses: I35 0 - Nonrheumatic aortic (valve) stenosis     Sonographer:  Gerald Anaya RDCS  Interpreting Physician:  Radha Ramon MD  Referring Physician:  Lula Neff MD  Group:  [id number]  cc:  Melecio Nicole MD  Cardiology Fellow:  Meeta Pichardo MD     SUMMARY     LEFT VENTRICLE:  Systolic function was mildly reduced  Ejection fraction was estimated to be 45 %  There was moderate hypokinesis of the basal inferoseptal, entire inferior, and basal-mid inferolateral wall(s)      LEFT ATRIUM:  The atrium was mildly dilated      MITRAL VALVE:  A 25 mm mechanical prosthesis ([facility]) was present  The prosthesis was well-seated without stenosis and with mild central regurgitation  Mean transmitral gradient was 3 mmHg      AORTIC VALVE:  The valve was trileaflet  Leaflets exhibited markedly increased thickness, marked calcification, and moderately reduced cuspal separation  Transaortic velocity was increased due to valvular stenosis  Aortic valve perimeter and area measured 8 52 cm and 4 55 cm2, respectively  Other measurements included anteroposterior diameters of LVOT (20 mm), aortic valve annulus (21 mm),  aortic root (23 mm), sinotubular junction (21 mm) and ascending aorta (24 mm)  There was severe stenosis  There was mild to moderate regurgitation  Valve mean gradient was 30 mmHg  The aortic valve obstructive index (by VTI) was 0 26  Estimated aortic valve area (by VTI) was 0 82 cm squared      TRICUSPID VALVE:  There was moderate regurgitation  Estimated peak PA pressure was 50 mmHg  The findings suggest moderate pulmonary hypertension      HISTORY: PRIOR HISTORY: Coronary artery disease, Aortic stenosis, Congestive heart failure, Diabetes, Hypertension, Hyperlipidemia, Ischemic cardiomyopathy      PROCEDURE: The procedure was performed in the echo lab  This was a routine study   The risks and alternatives of the procedure were explained to the patient and informed consent was obtained  The transesophageal approach was used  The study  included complete 2D imaging, 3D imaging, M-mode, complete spectral Doppler, and color Doppler  The heart rate was 82 bpm, at the start of the study  An adult omniplane probe was inserted by the cardiology fellow under direct supervision  of the attending cardiologist  Intubated with ease  One intubation attempt(s)  There was no blood detected on the probe  Image quality was adequate  There were no complications during the procedure  MEDICATIONS: Benzocaine spray, topical  to oropharynx, prior to procedure  Anesthesia administered by anesthesia team      LEFT VENTRICLE: Size was normal  Systolic function was mildly reduced  Ejection fraction was estimated to be 45 %  There was moderate hypokinesis of the basal inferoseptal, entire inferior, and basal-mid inferolateral wall(s)  Wall  thickness was at the upper limits of normal  There was no evidence of concentric hypertrophy  DOPPLER: The study was not technically sufficient to allow evaluation of LV diastolic function      RIGHT VENTRICLE: The size was normal  Systolic function was normal  Wall thickness was normal      LEFT ATRIUM: The atrium was mildly dilated  No mass was present  No thrombus was identified  APPENDAGE: The size was normal  No thrombus was identified  DOPPLER: The function was normal (normal emptying velocity)      ATRIAL SEPTUM: No defect or patent foramen ovale was identified  Doppler evaluation was performed  There was no right-to-left shunt, in the baseline state      RIGHT ATRIUM: Size was normal  No thrombus was identified      MITRAL VALVE: A 25 mm mechanical prosthesis ([facility]) was present  The prosthesis was well-seated without stenosis and with mild central regurgitation      AORTIC VALVE: The valve was trileaflet  Leaflets exhibited markedly increased thickness, marked calcification, and moderately reduced cuspal separation   DOPPLER: Transaortic velocity was increased due to valvular stenosis  Aortic valve  perimeter and area measured 8 52 cm and 4 55 cm2, respectively  Other measurements included anteroposterior diameters of LVOT (20 mm), aortic valve annulus (21 mm), aortic root (23 mm), sinotubular junction (21 mm) and ascending aorta (24  mm)  There was severe stenosis  There was mild to moderate regurgitation      TRICUSPID VALVE: The valve structure was normal  There was normal leaflet separation  DOPPLER: There was moderate regurgitation  Pulmonary artery systolic pressure was moderately increased  Estimated peak PA pressure was 50 mmHg  The  findings suggest moderate pulmonary hypertension      PULMONIC VALVE: Leaflets exhibited normal thickness, no calcification, and normal cuspal separation  DOPPLER: There was trace regurgitation      PERICARDIUM: There was no pericardial effusion  The pericardium was normal in appearance      AORTA: The root exhibited normal size  There was no atheroma  There was no evidence for dissection  There was no evidence for aneurysm      MEASUREMENT TABLES       Imaging: I have personally reviewed pertinent reports  VTE Prophylaxis: Warfarin (Coumadin)    Code Status: Level 1 - Full Code  Advance Directive and Living Will:      Power of :    POLST:      Counseling / Coordination of Care  Total floor / unit time spent today 40 minutes  Greater than 50% of total time was spent with the patient and / or family counseling and / or coordination of care

## 2018-05-20 NOTE — SOCIAL WORK
CM met with Pt with an introduction and explanation of role  Pt reported residing with spouse and adult son in a 2 story home with no use of DME and 7 steps to enter  Pt reported being independent with ADLs, had VNA in the past but no hx of SNF, mental health or drug/alcohol placements  Pt denied having a living will and reported using [facility] in [place]  CM reviewed d/c planning process including the following: identifying help at home, patient preference for d/c planning needs, Discharge Lounge, [facility] Meds to Bed program, availability of treatment team to discuss questions or concerns patient and/or family may have regarding understanding medications and recognizing signs and symptoms once discharged  CM also encouraged patient to follow up with all recommended appointments after discharge  Patient advised of importance for patient and family to participate in managing patients medical well being

## 2018-05-21 ENCOUNTER — ANESTHESIA EVENT (INPATIENT)
Dept: PERIOP | Facility: HOSPITAL | Age: 73
DRG: 267 | End: 2018-05-21
Payer: MEDICARE

## 2018-05-21 PROBLEM — D64.9 ANEMIA: Status: ACTIVE | Noted: 2018-05-21

## 2018-05-21 LAB
ABO GROUP BLD: NORMAL
ANION GAP SERPL CALCULATED.3IONS-SCNC: 6 MMOL/L (ref 4–13)
APTT PPP: 57 SECONDS (ref 24–36)
BLD GP AB SCN SERPL QL: POSITIVE
BUN SERPL-MCNC: 21 MG/DL (ref 5–25)
CALCIUM SERPL-MCNC: 8.9 MG/DL (ref 8.3–10.1)
CHLORIDE SERPL-SCNC: 105 MMOL/L (ref 100–108)
CO2 SERPL-SCNC: 28 MMOL/L (ref 21–32)
CREAT SERPL-MCNC: 1.1 MG/DL (ref 0.6–1.3)
ERYTHROCYTE [DISTWIDTH] IN BLOOD BY AUTOMATED COUNT: 14.4 % (ref 11.6–15.1)
GFR SERPL CREATININE-BSD FRML MDRD: 51 ML/MIN/1.73SQ M
GLUCOSE SERPL-MCNC: 103 MG/DL (ref 65–140)
HCT VFR BLD AUTO: 34.6 % (ref 34.8–46.1)
HGB BLD-MCNC: 10.7 G/DL (ref 11.5–15.4)
INR PPP: 2 (ref 0.86–1.17)
MCH RBC QN AUTO: 28.7 PG (ref 26.8–34.3)
MCHC RBC AUTO-ENTMCNC: 30.9 G/DL (ref 31.4–37.4)
MCV RBC AUTO: 93 FL (ref 82–98)
PLATELET # BLD AUTO: 210 THOUSANDS/UL (ref 149–390)
PMV BLD AUTO: 11.9 FL (ref 8.9–12.7)
POTASSIUM SERPL-SCNC: 3.7 MMOL/L (ref 3.5–5.3)
PROTHROMBIN TIME: 22.8 SECONDS (ref 11.8–14.2)
RBC # BLD AUTO: 3.73 MILLION/UL (ref 3.81–5.12)
RH BLD: POSITIVE
SODIUM SERPL-SCNC: 139 MMOL/L (ref 136–145)
SPECIMEN EXPIRATION DATE: NORMAL
WBC # BLD AUTO: 5.6 THOUSAND/UL (ref 4.31–10.16)

## 2018-05-21 PROCEDURE — 86922 COMPATIBILITY TEST ANTIGLOB: CPT

## 2018-05-21 PROCEDURE — 85610 PROTHROMBIN TIME: CPT | Performed by: PHYSICIAN ASSISTANT

## 2018-05-21 PROCEDURE — 86921 COMPATIBILITY TEST INCUBATE: CPT

## 2018-05-21 PROCEDURE — 86901 BLOOD TYPING SEROLOGIC RH(D): CPT | Performed by: PHYSICIAN ASSISTANT

## 2018-05-21 PROCEDURE — 99232 SBSQ HOSP IP/OBS MODERATE 35: CPT | Performed by: INTERNAL MEDICINE

## 2018-05-21 PROCEDURE — 85027 COMPLETE CBC AUTOMATED: CPT | Performed by: PHYSICIAN ASSISTANT

## 2018-05-21 PROCEDURE — 99231 SBSQ HOSP IP/OBS SF/LOW 25: CPT | Performed by: THORACIC SURGERY (CARDIOTHORACIC VASCULAR SURGERY)

## 2018-05-21 PROCEDURE — 86850 RBC ANTIBODY SCREEN: CPT | Performed by: PHYSICIAN ASSISTANT

## 2018-05-21 PROCEDURE — 85730 THROMBOPLASTIN TIME PARTIAL: CPT | Performed by: PHYSICIAN ASSISTANT

## 2018-05-21 PROCEDURE — 86900 BLOOD TYPING SEROLOGIC ABO: CPT | Performed by: PHYSICIAN ASSISTANT

## 2018-05-21 PROCEDURE — 80048 BASIC METABOLIC PNL TOTAL CA: CPT | Performed by: PHYSICIAN ASSISTANT

## 2018-05-21 RX ORDER — CHLORHEXIDINE GLUCONATE 0.12 MG/ML
15 RINSE ORAL EVERY 12 HOURS SCHEDULED
Status: DISCONTINUED | OUTPATIENT
Start: 2018-05-21 | End: 2018-05-22 | Stop reason: HOSPADM

## 2018-05-21 RX ORDER — DIPHENHYDRAMINE HCL 25 MG
50 TABLET ORAL ONCE
Status: COMPLETED | OUTPATIENT
Start: 2018-05-22 | End: 2018-05-22

## 2018-05-21 RX ORDER — PREDNISONE 20 MG/1
60 TABLET ORAL ONCE
Status: COMPLETED | OUTPATIENT
Start: 2018-05-21 | End: 2018-05-21

## 2018-05-21 RX ORDER — LOPERAMIDE HYDROCHLORIDE 2 MG/1
2 CAPSULE ORAL EVERY 4 HOURS PRN
Status: DISCONTINUED | OUTPATIENT
Start: 2018-05-21 | End: 2018-05-22 | Stop reason: HOSPADM

## 2018-05-21 RX ORDER — PREDNISONE 20 MG/1
60 TABLET ORAL ONCE
Status: COMPLETED | OUTPATIENT
Start: 2018-05-22 | End: 2018-05-22

## 2018-05-21 RX ORDER — DIPHENHYDRAMINE HCL 25 MG
50 TABLET ORAL ONCE
Status: COMPLETED | OUTPATIENT
Start: 2018-05-21 | End: 2018-05-21

## 2018-05-21 RX ORDER — FAMOTIDINE 20 MG/1
20 TABLET, FILM COATED ORAL ONCE
Status: COMPLETED | OUTPATIENT
Start: 2018-05-22 | End: 2018-05-22

## 2018-05-21 RX ORDER — HEPARIN SODIUM 10000 [USP'U]/100ML
3-20 INJECTION, SOLUTION INTRAVENOUS
Status: DISCONTINUED | OUTPATIENT
Start: 2018-05-21 | End: 2018-05-22 | Stop reason: HOSPADM

## 2018-05-21 RX ORDER — FAMOTIDINE 20 MG/1
20 TABLET, FILM COATED ORAL ONCE
Status: COMPLETED | OUTPATIENT
Start: 2018-05-21 | End: 2018-05-21

## 2018-05-21 RX ORDER — ACETAMINOPHEN 325 MG/1
650 TABLET ORAL EVERY 6 HOURS PRN
Status: DISCONTINUED | OUTPATIENT
Start: 2018-05-21 | End: 2018-05-22 | Stop reason: HOSPADM

## 2018-05-21 RX ADMIN — CLOPIDOGREL BISULFATE 75 MG: 75 TABLET, FILM COATED ORAL at 08:12

## 2018-05-21 RX ADMIN — CHLORHEXIDINE GLUCONATE 15 ML: 1.2 RINSE ORAL at 21:24

## 2018-05-21 RX ADMIN — ACETAMINOPHEN 650 MG: 325 TABLET, FILM COATED ORAL at 08:48

## 2018-05-21 RX ADMIN — SACUBITRIL AND VALSARTAN 1 TABLET: 24; 26 TABLET, FILM COATED ORAL at 08:12

## 2018-05-21 RX ADMIN — VITAMIN D, TAB 1000IU (100/BT) 2000 UNITS: 25 TAB at 08:12

## 2018-05-21 RX ADMIN — PRAVASTATIN SODIUM 40 MG: 40 TABLET ORAL at 21:24

## 2018-05-21 RX ADMIN — TRAMADOL HYDROCHLORIDE 50 MG: 50 TABLET, FILM COATED ORAL at 20:26

## 2018-05-21 RX ADMIN — MUPIROCIN 1 APPLICATION: 20 OINTMENT TOPICAL at 21:24

## 2018-05-21 RX ADMIN — ASPIRIN 81 MG 81 MG: 81 TABLET ORAL at 08:12

## 2018-05-21 RX ADMIN — MUPIROCIN 1 APPLICATION: 20 OINTMENT TOPICAL at 08:11

## 2018-05-21 RX ADMIN — PANTOPRAZOLE SODIUM 40 MG: 40 TABLET, DELAYED RELEASE ORAL at 08:12

## 2018-05-21 RX ADMIN — PREDNISONE 60 MG: 20 TABLET ORAL at 17:08

## 2018-05-21 RX ADMIN — CHLORHEXIDINE GLUCONATE 15 ML: 1.2 RINSE ORAL at 08:48

## 2018-05-21 RX ADMIN — FAMOTIDINE 20 MG: 20 TABLET ORAL at 17:08

## 2018-05-21 RX ADMIN — HEPARIN SODIUM AND DEXTROSE 12 UNITS/KG/HR: 10000; 5 INJECTION INTRAVENOUS at 08:49

## 2018-05-21 RX ADMIN — LOPERAMIDE HYDROCHLORIDE 2 MG: 2 CAPSULE ORAL at 14:29

## 2018-05-21 RX ADMIN — FUROSEMIDE 40 MG: 40 TABLET ORAL at 08:11

## 2018-05-21 RX ADMIN — DIPHENHYDRAMINE HCL 50 MG: 25 TABLET ORAL at 17:08

## 2018-05-21 RX ADMIN — CARVEDILOL 3.12 MG: 3.12 TABLET, FILM COATED ORAL at 08:12

## 2018-05-21 NOTE — PROGRESS NOTES
Progress Note - Cardiothoracic Surgery   Martha Traylor 81 y o  female MRN: [id number]  Unit/Bed#: [facility] 408-01 Encounter: [id number]      24 Hour Events: No events  No complaints  Denies major cardiac symptomatology  Still agreeable to sx, all questions addressed during encounter      Medications:   Scheduled Meds:  Current Facility-Administered Medications:  acetaminophen 650 mg Oral Q6H PRN Deborah Cortez PA-C   aspirin 81 mg Oral Daily Timothy Merritt PA-C   carvedilol 3 125 mg Oral BID With Meals Earl Ward PA-C   cholecalciferol 2,000 Units Oral Daily Timothy Merritt PA-C   clopidogrel 75 mg Oral Daily Timothy Merritt PA-C   furosemide 40 mg Oral Daily Timothy Merritt PA-C   heparin (porcine) 3-20 Units/kg/hr (Order-Specific) Intravenous Titrated Deborah Cortez PA-C   mupirocin  Nasal Q12H [facility] Timothy Merritt PA-C   nitroglycerin 0 6 mg Sublingual Q5 Min PRN Timothy Merritt PA-C   pantoprazole 40 mg Oral Daily Timothy Merritt PA-C   pravastatin 40 mg Oral HS Timothy Merritt PA-C   sacubitril-valsartan 1 tablet Oral BID Timothy Merritt PA-C   traMADol 50 mg Oral Q6H PRN Timothy Merritt PA-C     Continuous Infusions:  heparin (porcine) 3-20 Units/kg/hr (Order-Specific)       Results:     Results from last 7 days  Lab Units 05/21/18  0612 05/20/18  0512 05/19/18  1106   WBC Thousand/uL 5 60 7 05 6 93   HEMOGLOBIN g/dL 10 7* 9 9* 10 8*   HEMATOCRIT % 34 6* 31 3* 35 0   PLATELETS Thousands/uL 210 196 222       Results from last 7 days  Lab Units 05/21/18  0612 05/20/18  0512 05/19/18   SODIUM mmol/L 139 139 140   POTASSIUM mmol/L 3 7 3 7 3 9   CHLORIDE mmol/L 105 107 108   CO2 mmol/L 28 27 27   BUN mg/dL 21 20 23   CREATININE mg/dL 1 10 1 00 1 14   GLUCOSE RANDOM mg/dL 103 100 117   CALCIUM mg/dL 8 9 8 7 8 8       Results from last 7 days  Lab Units 05/21/18  0612 05/20/18  0512 05/19/18  1048   INR  2 00* 3 45* 4 30*       Studies:   All pre-op testing complete    Vitals: Vitals:    05/20/18 1920 05/20/18 2332 05/21/18 0308 05/21/18 0745   BP: (!) 104/47 93/50 93/59 112/53   BP Location: Left arm Left arm Left arm Left arm   Pulse: 70 69 84 69   Resp: 18 18 18 18   Temp: 97 7 °F (36 5 °C) 97 6 °F (36 4 °C) 98 °F (36 7 °C) 98 3 °F (36 8 °C)   TempSrc: Oral Oral Oral Oral   SpO2: 96% 97% 95% 99%   Weight:       Height:           Physical Exam:  HEENT/NECK:  PERRLA  No jugular venous distention  Cardiac: Regular rate and rhythm  Pulmonary:  Breath clear bilaterally  Abdomen:  Non-tender, Non-distended  Positive bowel sounds  Lower extremities: Extremities warm/dry  Radial/PT/DP pulses 2+ bilaterally  Trace edema B/L  Neuro: Alert and oriented X 3  Sensation is grossly intact  No focal deficits  Skin: Warm/Dry, without rashes or lesions  Assessment:  Patient Active Problem List   Diagnosis    Nonrheumatic aortic valve stenosis    Coronary artery disease involving coronary bypass graft of native heart without angina pectoris    [facility]    DM2 (diabetes mellitus, type 2) [facility]    Essential hypertension    HLD (hyperlipidemia)    History of ischemic cardiomyopathy    H/O mitral valve replacement with mechanical valve    Aortic valve stenosis     Severe aortic stenosis; Ongoing TAVR workup    Plan:  Patient agreeable to proceed with surgery; Informed consent signed & on chart  Pre-op work-up complete  Start heparin gtt today for mechanical MVR  Blood type and cross match ordered; RBC prepared  Continue Mupirocin 2% nasal ointment q 12 hrs  NPO after midnight  Continue topical chlorhexidine bath and mouth rise  Discontinue heparin infusion on call to OR  Preoperative oxygen, beta blocker, insulin, and antibiotics ordered transfemoral transcatheter aortic valve replacement scheduled for 5/22 with KYE Wright /Dr Colette Torrez PA-C  DATE: May 21, 2018  TIME: 8:26 AM

## 2018-05-21 NOTE — H&P
Cardiothoracic Surgery   History and Physical  Tea Reyes 81 y o  female MRN: [id number]  Unit/Bed#: [facility] 408-01 Encounter: [id number]    Attending Physician: KYE Butler  Admitting Diagnosis: aortic stenosis    History of Present Illness: Tea Reyes is a 81y o  year old female who presents for Coumadin to heparin bridge prior to transcatheter aortic valve replacement  Patient was seen in initial consultation on April 29, 2018  During her hospitalization had all preop testing and lab work complete as well as 2nd surgeon visit  Patient was deemed a good candidate for transcatheter aortic valve replacement and is planned for transfemoral transcatheter aortic valve replacement on May 22, 2018  Patient states she has been feeling well since discharge  Patient denies major cardiac symptomatology or any changes to her medical history/need for urgent care or emergency department visits since her last encounter with our team   Please refer to prior hospitalization for further details on patient's medical history/surgical history/family history/social history      Past Medical History:  Past Medical History:   Diagnosis Date    Aortic valvar stenosis     CAD (coronary artery disease) of bypass graft     Carotid stenosis, asymptomatic, bilateral 04/2018    50-69% b/l    Chronic CH[facility]congestive [facility] failure) [facility]     Compression deformity of vertebra     L2    DM2 (diabetes mellitus, type 2) ([facility])     oral controlled    H/O Hodgkin's lymphoma     History of cervical cancer     History of ischemic cardiomyopathy     History of uterine cancer     HLD (hyperlipidemia)     HTN (hypertension)     Iliac artery stenosis, right ([facility])     common, 50%    LBBB (left bundle branch block)     Mitral regurgitation     PAD (peripheral artery disease) ([facility]     Renal artery stenosis ([facility])     s/p renal artery stent    SSS (sick sinus syndrome) ([facility])     s/p ppm     Past Surgical History:   Past Surgical History:   Procedure Laterality Date    APPENDECTOMY      CARDIAC PACEMAKER PLACEMENT      CARDIAC SURGERY  09/2007    MV Repair, CABG x 2 by Dr Maribel Hunter @ [facility]  01/2008    Redo Sternotomy, MVR #25mm St  Victor Manuel Mechanical    CHOLECYSTECTOMY      COLECTOMY      RENAL ARTERY STENT      TONSILLECTOMY       Family History:  No family history on file  Social History:  History   Alcohol Use No     History   Drug Use No     History   Smoking Status    Former Smoker    Years: 15 00    Types: Cigarettes    Quit date: 2016   Smokeless Tobacco    Never Used     Marital Status: /Civil Union    Home Medications:   Prior to Admission medications    Medication Sig Start Date End Date Taking?  Authorizing Provider   aspirin 81 mg chewable tablet Chew 1 tablet (81 mg total) daily 5/4/18  Yes Magan Wilder PA-C   cholecalciferol (VITAMIN D3) 1,000 units tablet Take 2 tablets (2,000 Units total) by mouth daily 5/4/18  Yes Magan Wilder PA-C   clopidogrel (PLAVIX) 75 mg tablet Take 1 tablet (75 mg total) by mouth daily 5/4/18  Yes Magan Wilder PA-C   diphenoxylate-atropine (LOMOTIL) 2 5-0 025 mg per tablet Take 1 tablet by mouth 4 (four) times a day as needed   Yes Historical Provider, MD   furosemide (LASIX) 40 mg tablet Take 1 tablet (40 mg total) by mouth daily 5/4/18  Yes Magan Wilder PA-C   loperamide (IMODIUM) 2 mg capsule Take 2 capsules (4 mg total) by mouth 4 (four) times a day as needed for diarrhea 5/4/18  Yes Magan Wilder PA-C   nitroglycerin (NITROSTAT) 0 6 mg SL tablet Place 0 6 mg under the tongue every 5 (five) minutes as needed for chest pain   Yes Historical Provider, MD   pantoprazole (PROTONIX) 40 mg tablet Take 1 tablet (40 mg total) by mouth daily 5/4/18  Yes Magan Wilder PA-C   pravastatin (PRAVACHOL) 20 mg tablet Take 2 tablets (40 mg total) by mouth daily at bedtime 5/4/18  Yes Magan Wilder PA-C   traMADol (ULTRAM) 50 mg tablet Take 50 mg by mouth every 6 (six) hours as needed for moderate pain   Yes Historical Provider, MD   warfarin (COUMADIN) 2 mg tablet Take 2mg daily at bedtime unless otherwise directed starting 5/5/18 5/4/18  Yes Vivien Pereyra PA-C       Allergies: Allergies   Allergen Reactions    Contrast [Iodinated Diagnostic Agents] Shortness Of Breath and Throat Swelling    Ranolazine Shortness Of Breath and Vomiting     ranexa    Codeine GI Intolerance     vomit    Penicillin G Hives    Penicillins Throat Swelling    Sulfa Antibiotics GI Intolerance and Throat Swelling       Review of Systems:  Review of Systems - History obtained from the patient  General ROS: negative  Hematological and Lymphatic ROS: negative  Respiratory ROS: negative  Cardiovascular ROS: negative  Gastrointestinal ROS: negative  Genito-Urinary ROS: negative  Musculoskeletal ROS: negative  Neurological ROS: negative  Dermatological ROS: negative  All other ROS negative    Vital Signs:     Vitals:    05/20/18 2332 05/21/18 0308 05/21/18 0745 05/21/18 1144   BP: 93/50 93/59 112/53 (!) 95/44   BP Location: Left arm Left arm Left arm Left arm   Pulse: 69 84 69 69   Resp: 18 18 18 18   Temp: 97 6 °F (36 4 °C) 98 °F (36 7 °C) 98 3 °F (36 8 °C) 97 8 °F (36 6 °C)   TempSrc: Oral Oral Oral Oral   SpO2: 97% 95% 99% 94%   Weight:       Height:         Invasive Devices     Peripheral Intravenous Line            Peripheral IV 05/19/18 Right Antecubital 2 days                Physical Exam:    HEENT/NECK:  PERRLA  No jugular venous distention  HPXVDQV:1/3 systolic ejection Murmur  No rubs/gallops  Pulmonary:  Breath clear bilaterally  Abdomen:  Non-tender, Non-distended  Positive bowel sounds  Lower extremities: Extremities warm/dry  Radial/PT/DP pules 2+ bilaterally  Trace edema B/L  Neuro: Alert and oriented X 3  Sensation is grossly intact  No focal deficits  Skin: Warm/Dry, without rashes or lesions      Lab Results:       Results from last 7 days  Lab Units 05/21/18  0612 05/20/18  0512 05/19/18  1106   WBC Thousand/uL 5 60 7 05 6 93   HEMOGLOBIN g/dL 10 7* 9 9* 10 8*   HEMATOCRIT % 34 6* 31 3* 35 0   PLATELETS Thousands/uL 210 196 222       Results from last 7 days  Lab Units 05/21/18  0612 05/20/18  0512 05/19/18   SODIUM mmol/L 139 139 140   POTASSIUM mmol/L 3 7 3 7 3 9   CHLORIDE mmol/L 105 107 108   CO2 mmol/L 28 27 27   BUN mg/dL 21 20 23   CREATININE mg/dL 1 10 1 00 1 14   GLUCOSE RANDOM mg/dL 103 100 117   CALCIUM mg/dL 8 9 8 7 8 8       Results from last 7 days  Lab Units 05/21/18  0612 05/20/18  0512 05/19/18  1048   INR  2 00* 3 45* 4 30*     Lab Results   Component Value Date    HGBA1C 6 0 04/29/2018     No results found for: CKTOTAL, CKMB, CKMBINDEX, TROPONINI    Imaging Studies:     Cardiac Cath: done & images w/ Mihir Diaz    Echo: EF 45%, AV trileaflet, mean gradient 30, max 49  Moderate AS, Mod AI, mechanical MV prosthesis with normal function  Trivial TR  3D LORA 4/30: EF 45%; Well seated mechanical mitral valve  Trileaflet AV with severe AS and Mild to moderate AI      Carotid Ultrasound 4/30: 50-69 % Bilateral carotid stenosis with antegrade vertebral flow    EKG 4/30: NSR, rate 70, LBBB    CTA TAVR 5/1: TAVR measurements, diffusely small infrarenal aorta, 50% prox right common iliac stenosis, probable occlusion of right prox right coronary stent, compression deformity on L2, emphysema    PFT 4/30: FEV 1 1 14/63% pred, FEV1/FVC 85/112% pred    I have personally reviewed pertinent films in PACS    Assessment:  Patient Active Problem List    Diagnosis Date Noted    Anemia 05/21/2018    Aortic valve stenosis 05/03/2018    Nonrheumatic aortic valve stenosis     Coronary artery disease involving coronary bypass graft of native heart without angina pectoris     Chronic CHF (congestive heart failure) ([address] )     DM2 (diabetes mellitus, type 2) (HCC)     Essential hypertension     HLD (hyperlipidemia)     History of ischemic cardiomyopathy     H/O mitral valve replacement with mechanical valve      Severe aortic stenosis; Ongoing TAVR workup    Plan:  Tea Reyes is readmitted for Coumadin to heparin bridge prior to her transcatheter aortic valve replacement  Risks and benefits of transcatheter aortic valve replacement were discussed in detail today with the patient  They understand and wish to proceed with further workup and ultimately surgical intervention  We have ordered routine preoperative laboratory and vascular studies  Pending the results of these tests, they will be scheduled for surgery 5/22 with KYE Vines  & Dr Christine LEBLANC  O  Tea Reyes was comfortable with our recommendations, and their questions were answered to their satisfaction  We will continue to evaluate the patient daily with further recommendations as work up is completed  Thank you for allowing us to participate in the care of this patient       Kavita Wong PA-C  DATE: May 21, 2018  TIME: 12:32 PM

## 2018-05-21 NOTE — PHYSICIAN ADVISOR
Current patient class: Inpatient  The patient is currently on Hospital Day: 3      The patient was admitted to the hospital  on 5/19/18 at 82 for the following diagnosis:  Aortic valve stenosis, etiology of cardiac valve disease unspecified [I35 0]       There is documentation in the medical record of an expected length of stay of at least 2 midnights  The patient is therefore expected to satisfy the 2 midnight benchmark and given the 2 midnight presumption is appropriate for INPATIENT ADMISSION  Given this expectation of a satisfying stay, CMS instructs us that the patient is most often appropriate for inpatient admission under part A provided medical necessity is documented in the chart  After review of the relevant documentation, labs, vital signs and test results, the patient is appropriate for INPATIENT ADMISSION  Admission to the hospital as an inpatient is a complex decision making process which requires the practitioner to consider the patients presenting complaint, history and physical examination and all relevant testing  With this in mind, in this case, the patient was deemed appropriate for INPATIENT ADMISSION  After review of the documentation and testing available at the time of the admission I concur with this clinical determination of medical necessity  The patient does have an inpatient admission within the previous 30 days  The patient was admitted on 4/28/18 and discharged on 5/4/18 as an inpatient  The patient therefore required readmission review  During that admission, the patient was transferred to [facility] from [facility] for severe, symptomatic aortic stenosis and had a complete workup including carotid dopplers as well as cardiology evaluation and discharged on anticoagulation therapy in stable condition with planned readmission for 5/19/2018 for TAVR procedure  Therefore, in this case the patient should be considered a RELATED INPATIENT ADMISSION   The patient had been discharged in stable condition with a completed care plan however with planned readmission  Rationale is as follows: The patient is a 81 yrs female who presented to the hospital on 5/19 for planned readmission for TAVR with cardiothoracic surgery  The patient has an extensive cardiac history including CAD with previous CABG and multiple stents, ischemic cardiomyopathy with an EF of 45%, CHF and s/p pacemaker placement as well as s/p MVR  On exam the patient was in NAD with cardiac exam notable for 4/6 systolic murmur and intact distal pulses  The patient is currently on coumadin from previous and is pending initiation of heparin drip when INR < 2 45 as it was 4 30 on admission  Hemoglobin 10 8 and 9 9 on 5/19 and 5/20 respectively  The patient is currently planned for TAVR on 5/22 secondary to severe symptomatic AS as well as initiation of heparin drip given MVR with close monitoring of INR  She has consultations to cardiology  The patient also requires close telemetry/blood pressure monitoring to ensure adequate control prior to procedure and volume status monitoring  Given the patient has severe cardiac disease with symptomatic AS requiring TAVR, she is appropriate for INPATIENT ADMISSION  Furthermore, given the current admission was a planned readmit for the TAVR procedure, the current admission should be considered RELATED to the previous       The patients vitals on arrival were ED Triage Vitals   Temperature Pulse Respirations Blood Pressure SpO2   05/19/18 1130 05/19/18 1130 05/19/18 1130 05/19/18 1130 05/19/18 1130   97 9 °F (36 6 °C) 69 18 135/59 99 %      Temp Source Heart Rate Source Patient Position - Orthostatic VS BP Location FiO2 (%)   05/19/18 1130 05/19/18 2318 05/19/18 1130 05/19/18 1130 --   Oral Monitor Sitting Left arm       Pain Score       05/19/18 1130       No Pain           Past Medical History:   Diagnosis Date    Aortic valvar stenosis     CAD (coronary artery disease) of bypass graft  Carotid stenosis, asymptomatic, bilateral 04/2018    50-69% b/l    [facility]     DM2 (diabetes mellitus, type 2) [facility]     H/O Hodgkin's lymphoma     History of cervical cancer     History of ischemic cardiomyopathy     History of uterine cancer     HLD (hyperlipidemia)     HTN (hypertension)     LBBB (left bundle branch block)     Mitral regurgitation     PAD (peripheral artery disease) (HCC)     Renal artery stenosis (HCC)     s/p renal artery stent    SSS (sick sinus syndrome) (HCC)     s/p ppm     Past Surgical History:   Procedure Laterality Date    APPENDECTOMY      CARDIAC PACEMAKER PLACEMENT      CARDIAC SURGERY  09/2007    MV Repair, CABG x 2 by Dr Anamaria Beck @ [address] [place] [address]  01/2008    Redo Sternotomy, MVR #25mm St  Victor Manuel Mechanical    CHOLECYSTECTOMY      COLECTOMY      RENAL ARTERY STENT      TONSILLECTOMY             Consults have been placed to:   IP CONSULT TO CARDIOLOGY    Vitals:    05/20/18 1920 05/20/18 2332 05/21/18 0308 05/21/18 0745   BP: (!) 104/47 93/50 93/59 112/53   BP Location: Left arm Left arm Left arm Left arm   Pulse: 70 69 84 69   Resp: 18 18 18 18   Temp: 97 7 °F (36 5 °C) 97 6 °F (36 4 °C) 98 °F (36 7 °C) 98 3 °F (36 8 °C)   TempSrc: Oral Oral Oral Oral   SpO2: 96% 97% 95% 99%   Weight:       Height:           Most recent labs:    Recent Labs      05/21/18   0612   WBC  5 60   HGB  10 7*   HCT  34 6*   PLT  210   K  3 7   NA  139   CALCIUM  8 9   BUN  21   CREATININE  1 10   INR  2 00*       Scheduled Meds:  Current Facility-Administered Medications:  acetaminophen 650 mg Oral Q6H PRN Barbara Hannah PA-C    aspirin 81 mg Oral Daily Timothy Merritt PA-C    carvedilol 3 125 mg Oral BID With Meals Timothy Merritt PA-C    chlorhexidine 15 mL Swish & Spit Q12H [facility] Barbara Hannah PA-C    cholecalciferol 2,000 Units Oral Daily Timothy Merritt PA-C    clopidogrel 75 mg Oral Daily KYE Conrad PA-C furosemide 40 mg Oral Daily Timothy Merritt PA-C    heparin (porcine) 3-20 Units/kg/hr (Order-Specific) Intravenous Titrated Yolanda Castro PA-C Last Rate: 12 Units/kg/hr (05/21/18 0849)   [START ON 5/22/2018] metoprolol tartrate 12 5 mg Oral Once Yolanda Castro PA-C    mupirocin 1 application Nasal W02X [facility] [employer] Yolanda Castro PA-C    nitroglycerin 0 6 mg Sublingual Q5 Min PRN Timothy Merritt PA-C    pantoprazole 40 mg Oral Daily Timothy Merritt PA-C    pravastatin 40 mg Oral HS Timothy Merritt PA-C    traMADol 50 mg Oral Q6H PRN Timothy Merritt PA-C      Continuous Infusions:  heparin (porcine) 3-20 Units/kg/hr (Order-Specific) Last Rate: 12 Units/kg/hr (05/21/18 0849)     PRN Meds:   acetaminophen    nitroglycerin    traMADol    Surgical procedures (if appropriate):  Procedure(s):  REPLACEMENT AORTIC VALVE TRANSCATHETER (TAVR) TRANSFEMORAL W/ 23 MM HUGHES KOKO S3 VALVE (ACCESS ON LEFT)  TRANSESOPHAGEAL ECHOCARDIOGRAM (LORA)

## 2018-05-21 NOTE — RESPIRATORY THERAPY NOTE
RT Protocol Note  Grayling Mohs 81 y o  female MRN: [id number]  Unit/Bed#: [facility] 408-01 Encounter: [id number]    Assessment    Principal Problem:     Aortic valve stenosis  Active Problems:    Nonrheumatic aortic valve stenosis    Coronary artery disease involving coronary bypass graft of native heart without angina pectoris    Chronic CHF (congestive heart failure) ([facility])    DM2 (diabetes mellitus, type 2) ([facility])    Essential hypertension    HLD (hyperlipidemia)    History of ischemic cardiomyopathy    H/O mitral valve replacement with mechanical valve    Anemia      Home Pulmonary Medications:         Past Medical History:   Diagnosis Date    Aortic valvar stenosis     CAD (coronary artery disease) of bypass graft     Carotid stenosis, asymptomatic, bilateral 04/2018    50-69% b/l    [facility]) [facility] [address] [facility]     DM2 [facility]diabetes mellitus, [facility] 2) [facility]     H/O Hodgkin's lymphoma     History of cervical cancer     History of ischemic cardiomyopathy     History of uterine cancer     HLD (hyperlipidemia)     HTN (hypertension)     LBBB (left bundle branch block)     Mitral regurgitation     PAD (peripheral artery disease) ([facility])     Renal artery stenosis ([facility])     s/p renal artery stent    SSS (sick sinus syndrome) ([facility])     s/p ppm     Social History     Social History    Marital status: /Civil Union     Spouse name: N/A    Number of children: N/A    Years of education: N/A     Social History Main Topics    Smoking status: Former Smoker     Years: 15 00     Types: Cigarettes     Quit date: 2016    Smokeless tobacco: Never Used    Alcohol use No    Drug use: No    Sexual activity: Not on file     Other Topics Concern    Not on file     Social History Narrative    No narrative on file       Subjective         Objective    Physical Exam:   Assessment Type: Assess only  General Appearance: Alert, Awake  Respiratory Pattern: Normal  Chest Assessment: Chest expansion symmetrical  Bilateral Breath Sounds: Clear  Cough: None    Vitals:  Blood pressure 112/53, pulse 69, temperature 98 3 °F (36 8 °C), temperature source Oral, resp  rate 18, height 4' 11" (1 499 m), weight 53 8 kg (118 lb 9 6 oz), SpO2 99 %  Imaging and other studies: I have personally reviewed pertinent reports  Plan    Respiratory Plan: No distress/Pulmonary history        Resp Comments: Pt evaluated per resp protocol  Pt is scheduled for a TAVR tomorrow  Pt instructed on IS  She doesnt have any pulmonary history

## 2018-05-22 ENCOUNTER — ANESTHESIA (INPATIENT)
Dept: PERIOP | Facility: HOSPITAL | Age: 73
DRG: 267 | End: 2018-05-22
Payer: MEDICARE

## 2018-05-22 ENCOUNTER — APPOINTMENT (INPATIENT)
Dept: RADIOLOGY | Facility: HOSPITAL | Age: 73
DRG: 267 | End: 2018-05-22
Payer: MEDICARE

## 2018-05-22 ENCOUNTER — APPOINTMENT (INPATIENT)
Dept: NON INVASIVE DIAGNOSTICS | Facility: HOSPITAL | Age: 73
DRG: 267 | End: 2018-05-22
Attending: THORACIC SURGERY (CARDIOTHORACIC VASCULAR SURGERY)
Payer: MEDICARE

## 2018-05-22 PROBLEM — Z95.2 S/P TAVR (TRANSCATHETER AORTIC VALVE REPLACEMENT): Status: ACTIVE | Noted: 2018-05-22

## 2018-05-22 LAB
ANION GAP SERPL CALCULATED.3IONS-SCNC: 9 MMOL/L (ref 4–13)
APTT PPP: 60 SECONDS (ref 24–36)
APTT PPP: 77 SECONDS (ref 24–36)
ATRIAL RATE: 69 BPM
BASE EXCESS BLDA CALC-SCNC: -3 MMOL/L (ref -2–3)
BASE EXCESS BLDA CALC-SCNC: 0 MMOL/L (ref -2–3)
BASE EXCESS BLDA CALC-SCNC: 1 MMOL/L (ref -2–3)
BUN SERPL-MCNC: 21 MG/DL (ref 5–25)
CA-I BLD-SCNC: 1.12 MMOL/L (ref 1.12–1.32)
CA-I BLD-SCNC: 1.14 MMOL/L (ref 1.12–1.32)
CA-I BLD-SCNC: 1.17 MMOL/L (ref 1.12–1.32)
CALCIUM SERPL-MCNC: 8.3 MG/DL (ref 8.3–10.1)
CHLORIDE SERPL-SCNC: 105 MMOL/L (ref 100–108)
CO2 SERPL-SCNC: 24 MMOL/L (ref 21–32)
CREAT SERPL-MCNC: 1.16 MG/DL (ref 0.6–1.3)
FIO2 GAS DIL.REBREATH: 50 L
GFR SERPL CREATININE-BSD FRML MDRD: 48 ML/MIN/1.73SQ M
GLUCOSE SERPL-MCNC: 105 MG/DL (ref 65–140)
GLUCOSE SERPL-MCNC: 161 MG/DL (ref 65–140)
GLUCOSE SERPL-MCNC: 170 MG/DL (ref 65–140)
GLUCOSE SERPL-MCNC: 175 MG/DL (ref 65–140)
GLUCOSE SERPL-MCNC: 186 MG/DL (ref 65–140)
GLUCOSE SERPL-MCNC: 191 MG/DL (ref 65–140)
GLUCOSE SERPL-MCNC: 205 MG/DL (ref 65–140)
GLUCOSE SERPL-MCNC: 245 MG/DL (ref 65–140)
GLUCOSE SERPL-MCNC: 82 MG/DL (ref 65–140)
GLUCOSE SERPL-MCNC: 88 MG/DL (ref 65–140)
GLUCOSE SERPL-MCNC: 90 MG/DL (ref 65–140)
HCO3 BLDA-SCNC: 23 MMOL/L (ref 22–28)
HCO3 BLDA-SCNC: 23.9 MMOL/L (ref 22–28)
HCO3 BLDA-SCNC: 25.9 MMOL/L (ref 22–28)
HCT VFR BLD AUTO: 27.9 % (ref 34.8–46.1)
HCT VFR BLD AUTO: 28 % (ref 34.8–46.1)
HCT VFR BLD CALC: 24 % (ref 34.8–46.1)
HCT VFR BLD CALC: 27 % (ref 34.8–46.1)
HCT VFR BLD CALC: 27 % (ref 34.8–46.1)
HGB BLD-MCNC: 9.1 G/DL (ref 11.5–15.4)
HGB BLD-MCNC: 9.1 G/DL (ref 11.5–15.4)
HGB BLDA-MCNC: 8.2 G/DL (ref 11.5–15.4)
HGB BLDA-MCNC: 9.2 G/DL (ref 11.5–15.4)
HGB BLDA-MCNC: 9.2 G/DL (ref 11.5–15.4)
INR PPP: 1.47 (ref 0.86–1.17)
INR PPP: 1.64 (ref 0.86–1.17)
KCT BLD-ACNC: 132 SEC (ref 89–137)
KCT BLD-ACNC: 132 SEC (ref 89–137)
KCT BLD-ACNC: 430 SEC (ref 89–137)
P AXIS: 81 DEGREES
PCO2 BLD: 24 MMOL/L (ref 21–32)
PCO2 BLD: 25 MMOL/L (ref 21–32)
PCO2 BLD: 27 MMOL/L (ref 21–32)
PCO2 BLD: 35.8 MM HG (ref 36–44)
PCO2 BLD: 42.9 MM HG (ref 36–44)
PCO2 BLD: 43.1 MM HG (ref 36–44)
PH BLD: 7.33 [PH] (ref 7.35–7.45)
PH BLD: 7.39 [PH] (ref 7.35–7.45)
PH BLD: 7.43 [PH] (ref 7.35–7.45)
PLATELET # BLD AUTO: 163 THOUSANDS/UL (ref 149–390)
PMV BLD AUTO: 12.1 FL (ref 8.9–12.7)
PO2 BLD: 170 MM HG (ref 75–129)
PO2 BLD: 244 MM HG (ref 75–129)
PO2 BLD: 390 MM HG (ref 75–129)
POTASSIUM BLD-SCNC: 3.5 MMOL/L (ref 3.5–5.3)
POTASSIUM BLD-SCNC: 3.7 MMOL/L (ref 3.5–5.3)
POTASSIUM BLD-SCNC: 4.1 MMOL/L (ref 3.5–5.3)
POTASSIUM SERPL-SCNC: 3.5 MMOL/L (ref 3.5–5.3)
POTASSIUM SERPL-SCNC: 4.6 MMOL/L (ref 3.5–5.3)
PR INTERVAL: 254 MS
PROTHROMBIN TIME: 17.9 SECONDS (ref 11.8–14.2)
PROTHROMBIN TIME: 19.5 SECONDS (ref 11.8–14.2)
QRS AXIS: -88 DEGREES
QRSD INTERVAL: 179 MS
QT INTERVAL: 621 MS
QTC INTERVAL: 666 MS
SAO2 % BLD FROM PO2: 100 % (ref 95–98)
SODIUM BLD-SCNC: 138 MMOL/L (ref 136–145)
SODIUM BLD-SCNC: 138 MMOL/L (ref 136–145)
SODIUM BLD-SCNC: 139 MMOL/L (ref 136–145)
SODIUM SERPL-SCNC: 138 MMOL/L (ref 136–145)
SPECIMEN SOURCE: ABNORMAL
SPECIMEN SOURCE: NORMAL
SPECIMEN SOURCE: NORMAL
T WAVE AXIS: 83 DEGREES
VENTRICULAR RATE: 69 BPM

## 2018-05-22 PROCEDURE — 82947 ASSAY GLUCOSE BLOOD QUANT: CPT

## 2018-05-22 PROCEDURE — 85610 PROTHROMBIN TIME: CPT | Performed by: THORACIC SURGERY (CARDIOTHORACIC VASCULAR SURGERY)

## 2018-05-22 PROCEDURE — 85018 HEMOGLOBIN: CPT | Performed by: THORACIC SURGERY (CARDIOTHORACIC VASCULAR SURGERY)

## 2018-05-22 PROCEDURE — 85730 THROMBOPLASTIN TIME PARTIAL: CPT | Performed by: PHYSICIAN ASSISTANT

## 2018-05-22 PROCEDURE — 80048 BASIC METABOLIC PNL TOTAL CA: CPT | Performed by: THORACIC SURGERY (CARDIOTHORACIC VASCULAR SURGERY)

## 2018-05-22 PROCEDURE — 82803 BLOOD GASES ANY COMBINATION: CPT

## 2018-05-22 PROCEDURE — 76377 3D RENDER W/INTRP POSTPROCES: CPT

## 2018-05-22 PROCEDURE — C1769 GUIDE WIRE: HCPCS | Performed by: THORACIC SURGERY (CARDIOTHORACIC VASCULAR SURGERY)

## 2018-05-22 PROCEDURE — 94760 N-INVAS EAR/PLS OXIMETRY 1: CPT

## 2018-05-22 PROCEDURE — C1751 CATH, INF, PER/CENT/MIDLINE: HCPCS | Performed by: THORACIC SURGERY (CARDIOTHORACIC VASCULAR SURGERY)

## 2018-05-22 PROCEDURE — C1760 CLOSURE DEV, VASC: HCPCS | Performed by: THORACIC SURGERY (CARDIOTHORACIC VASCULAR SURGERY)

## 2018-05-22 PROCEDURE — 85049 AUTOMATED PLATELET COUNT: CPT | Performed by: THORACIC SURGERY (CARDIOTHORACIC VASCULAR SURGERY)

## 2018-05-22 PROCEDURE — C1894 INTRO/SHEATH, NON-LASER: HCPCS | Performed by: THORACIC SURGERY (CARDIOTHORACIC VASCULAR SURGERY)

## 2018-05-22 PROCEDURE — 33361 REPLACE AORTIC VALVE PERQ: CPT | Performed by: THORACIC SURGERY (CARDIOTHORACIC VASCULAR SURGERY)

## 2018-05-22 PROCEDURE — 82330 ASSAY OF CALCIUM: CPT

## 2018-05-22 PROCEDURE — 02RF38Z REPLACEMENT OF AORTIC VALVE WITH ZOOPLASTIC TISSUE, PERCUTANEOUS APPROACH: ICD-10-PCS | Performed by: THORACIC SURGERY (CARDIOTHORACIC VASCULAR SURGERY)

## 2018-05-22 PROCEDURE — 93010 ELECTROCARDIOGRAM REPORT: CPT | Performed by: INTERNAL MEDICINE

## 2018-05-22 PROCEDURE — 84295 ASSAY OF SERUM SODIUM: CPT

## 2018-05-22 PROCEDURE — 99222 1ST HOSP IP/OBS MODERATE 55: CPT | Performed by: EMERGENCY MEDICINE

## 2018-05-22 PROCEDURE — 93005 ELECTROCARDIOGRAM TRACING: CPT

## 2018-05-22 PROCEDURE — 94002 VENT MGMT INPAT INIT DAY: CPT

## 2018-05-22 PROCEDURE — 71045 X-RAY EXAM CHEST 1 VIEW: CPT

## 2018-05-22 PROCEDURE — 84132 ASSAY OF SERUM POTASSIUM: CPT | Performed by: THORACIC SURGERY (CARDIOTHORACIC VASCULAR SURGERY)

## 2018-05-22 PROCEDURE — 85014 HEMATOCRIT: CPT

## 2018-05-22 PROCEDURE — 85610 PROTHROMBIN TIME: CPT | Performed by: PHYSICIAN ASSISTANT

## 2018-05-22 PROCEDURE — 33361 REPLACE AORTIC VALVE PERQ: CPT | Performed by: INTERNAL MEDICINE

## 2018-05-22 PROCEDURE — 84132 ASSAY OF SERUM POTASSIUM: CPT

## 2018-05-22 PROCEDURE — 85014 HEMATOCRIT: CPT | Performed by: THORACIC SURGERY (CARDIOTHORACIC VASCULAR SURGERY)

## 2018-05-22 PROCEDURE — 02HV33Z INSERTION OF INFUSION DEVICE INTO SUPERIOR VENA CAVA, PERCUTANEOUS APPROACH: ICD-10-PCS | Performed by: THORACIC SURGERY (CARDIOTHORACIC VASCULAR SURGERY)

## 2018-05-22 PROCEDURE — 93312 ECHO TRANSESOPHAGEAL: CPT

## 2018-05-22 PROCEDURE — 85347 COAGULATION TIME ACTIVATED: CPT

## 2018-05-22 PROCEDURE — 82948 REAGENT STRIP/BLOOD GLUCOSE: CPT

## 2018-05-22 DEVICE — PERCLOSE PROGLIDE™ SUTURE-MEDIATED CLOSURE SYSTEM
Type: IMPLANTABLE DEVICE | Site: GROIN | Status: FUNCTIONAL
Brand: PERCLOSE PROGLIDE™

## 2018-05-22 DEVICE — TAVR SAPIEN 3 CMNDR DLV SYS 23MM: Type: IMPLANTABLE DEVICE | Site: HEART | Status: FUNCTIONAL

## 2018-05-22 RX ORDER — HEPARIN SODIUM 10000 [USP'U]/100ML
3-20 INJECTION, SOLUTION INTRAVENOUS
Status: DISCONTINUED | OUTPATIENT
Start: 2018-05-22 | End: 2018-05-25

## 2018-05-22 RX ORDER — ONDANSETRON 2 MG/ML
INJECTION INTRAMUSCULAR; INTRAVENOUS AS NEEDED
Status: DISCONTINUED | OUTPATIENT
Start: 2018-05-22 | End: 2018-05-22 | Stop reason: SURG

## 2018-05-22 RX ORDER — POTASSIUM CHLORIDE 14.9 MG/ML
20 INJECTION INTRAVENOUS ONCE AS NEEDED
Status: COMPLETED | OUTPATIENT
Start: 2018-05-22 | End: 2018-05-22

## 2018-05-22 RX ORDER — SODIUM CHLORIDE, SODIUM LACTATE, POTASSIUM CHLORIDE, CALCIUM CHLORIDE 600; 310; 30; 20 MG/100ML; MG/100ML; MG/100ML; MG/100ML
INJECTION, SOLUTION INTRAVENOUS CONTINUOUS PRN
Status: DISCONTINUED | OUTPATIENT
Start: 2018-05-22 | End: 2018-05-22 | Stop reason: SURG

## 2018-05-22 RX ORDER — CARVEDILOL 3.12 MG/1
3.12 TABLET ORAL 2 TIMES DAILY WITH MEALS
Status: DISCONTINUED | OUTPATIENT
Start: 2018-05-23 | End: 2018-05-26 | Stop reason: HOSPADM

## 2018-05-22 RX ORDER — POTASSIUM CHLORIDE 14.9 MG/ML
20 INJECTION INTRAVENOUS
Status: DISCONTINUED | OUTPATIENT
Start: 2018-05-22 | End: 2018-05-23

## 2018-05-22 RX ORDER — CHLORHEXIDINE GLUCONATE 0.12 MG/ML
15 RINSE ORAL 2 TIMES DAILY
Status: DISCONTINUED | OUTPATIENT
Start: 2018-05-22 | End: 2018-05-23

## 2018-05-22 RX ORDER — MIDAZOLAM HYDROCHLORIDE 1 MG/ML
INJECTION INTRAMUSCULAR; INTRAVENOUS AS NEEDED
Status: DISCONTINUED | OUTPATIENT
Start: 2018-05-22 | End: 2018-05-22 | Stop reason: SURG

## 2018-05-22 RX ORDER — HEPARIN SODIUM 1000 [USP'U]/ML
INJECTION, SOLUTION INTRAVENOUS; SUBCUTANEOUS AS NEEDED
Status: DISCONTINUED | OUTPATIENT
Start: 2018-05-22 | End: 2018-05-22 | Stop reason: SURG

## 2018-05-22 RX ORDER — VANCOMYCIN HYDROCHLORIDE 1 G/200ML
1000 INJECTION, SOLUTION INTRAVENOUS EVERY 12 HOURS
Status: COMPLETED | OUTPATIENT
Start: 2018-05-22 | End: 2018-05-23

## 2018-05-22 RX ORDER — ONDANSETRON 2 MG/ML
4 INJECTION INTRAMUSCULAR; INTRAVENOUS EVERY 6 HOURS PRN
Status: DISCONTINUED | OUTPATIENT
Start: 2018-05-22 | End: 2018-05-26 | Stop reason: HOSPADM

## 2018-05-22 RX ORDER — CALCIUM CHLORIDE 100 MG/ML
1 INJECTION INTRAVENOUS; INTRAVENTRICULAR ONCE
Status: DISCONTINUED | OUTPATIENT
Start: 2018-05-22 | End: 2018-05-23

## 2018-05-22 RX ORDER — BISACODYL 10 MG
10 SUPPOSITORY, RECTAL RECTAL DAILY PRN
Status: DISCONTINUED | OUTPATIENT
Start: 2018-05-22 | End: 2018-05-26 | Stop reason: HOSPADM

## 2018-05-22 RX ORDER — MAGNESIUM HYDROXIDE 1200 MG/15ML
LIQUID ORAL AS NEEDED
Status: DISCONTINUED | OUTPATIENT
Start: 2018-05-22 | End: 2018-05-22 | Stop reason: HOSPADM

## 2018-05-22 RX ORDER — ATORVASTATIN CALCIUM 20 MG/1
20 TABLET, FILM COATED ORAL EVERY EVENING
Status: DISCONTINUED | OUTPATIENT
Start: 2018-05-22 | End: 2018-05-22

## 2018-05-22 RX ORDER — ACETAMINOPHEN 325 MG/1
650 TABLET ORAL EVERY 4 HOURS PRN
Status: DISCONTINUED | OUTPATIENT
Start: 2018-05-22 | End: 2018-05-26 | Stop reason: HOSPADM

## 2018-05-22 RX ORDER — FENTANYL CITRATE 50 UG/ML
INJECTION, SOLUTION INTRAMUSCULAR; INTRAVENOUS AS NEEDED
Status: DISCONTINUED | OUTPATIENT
Start: 2018-05-22 | End: 2018-05-22 | Stop reason: SURG

## 2018-05-22 RX ORDER — FENTANYL CITRATE 50 UG/ML
50 INJECTION, SOLUTION INTRAMUSCULAR; INTRAVENOUS
Status: DISCONTINUED | OUTPATIENT
Start: 2018-05-22 | End: 2018-05-23

## 2018-05-22 RX ORDER — PANTOPRAZOLE SODIUM 40 MG/1
40 TABLET, DELAYED RELEASE ORAL
Status: DISCONTINUED | OUTPATIENT
Start: 2018-05-22 | End: 2018-05-23 | Stop reason: SDUPTHER

## 2018-05-22 RX ORDER — TRAMADOL HYDROCHLORIDE 50 MG/1
50 TABLET ORAL EVERY 6 HOURS PRN
Status: DISCONTINUED | OUTPATIENT
Start: 2018-05-22 | End: 2018-05-26 | Stop reason: HOSPADM

## 2018-05-22 RX ORDER — LIDOCAINE HYDROCHLORIDE 10 MG/ML
INJECTION, SOLUTION INFILTRATION; PERINEURAL AS NEEDED
Status: DISCONTINUED | OUTPATIENT
Start: 2018-05-22 | End: 2018-05-22 | Stop reason: SURG

## 2018-05-22 RX ORDER — WARFARIN SODIUM 1 MG/1
2 TABLET ORAL
Status: COMPLETED | OUTPATIENT
Start: 2018-05-22 | End: 2018-05-22

## 2018-05-22 RX ORDER — ROCURONIUM BROMIDE 10 MG/ML
INJECTION, SOLUTION INTRAVENOUS AS NEEDED
Status: DISCONTINUED | OUTPATIENT
Start: 2018-05-22 | End: 2018-05-22 | Stop reason: SURG

## 2018-05-22 RX ORDER — GLYCOPYRROLATE 0.2 MG/ML
INJECTION INTRAMUSCULAR; INTRAVENOUS AS NEEDED
Status: DISCONTINUED | OUTPATIENT
Start: 2018-05-22 | End: 2018-05-22 | Stop reason: SURG

## 2018-05-22 RX ORDER — PROTAMINE SULFATE 10 MG/ML
INJECTION, SOLUTION INTRAVENOUS AS NEEDED
Status: DISCONTINUED | OUTPATIENT
Start: 2018-05-22 | End: 2018-05-22 | Stop reason: SURG

## 2018-05-22 RX ORDER — POLYETHYLENE GLYCOL 3350 17 G/17G
17 POWDER, FOR SOLUTION ORAL DAILY
Status: DISCONTINUED | OUTPATIENT
Start: 2018-05-22 | End: 2018-05-23

## 2018-05-22 RX ORDER — FENTANYL CITRATE 50 UG/ML
50 INJECTION, SOLUTION INTRAMUSCULAR; INTRAVENOUS ONCE
Status: DISCONTINUED | OUTPATIENT
Start: 2018-05-22 | End: 2018-05-23

## 2018-05-22 RX ORDER — SODIUM CHLORIDE, SODIUM GLUCONATE, SODIUM ACETATE, POTASSIUM CHLORIDE, MAGNESIUM CHLORIDE, SODIUM PHOSPHATE, DIBASIC, AND POTASSIUM PHOSPHATE .53; .5; .37; .037; .03; .012; .00082 G/100ML; G/100ML; G/100ML; G/100ML; G/100ML; G/100ML; G/100ML
50 INJECTION, SOLUTION INTRAVENOUS CONTINUOUS
Status: DISCONTINUED | OUTPATIENT
Start: 2018-05-22 | End: 2018-05-23

## 2018-05-22 RX ORDER — HEPARIN SODIUM 5000 [USP'U]/ML
5000 INJECTION, SOLUTION INTRAVENOUS; SUBCUTANEOUS EVERY 8 HOURS SCHEDULED
Status: DISCONTINUED | OUTPATIENT
Start: 2018-05-23 | End: 2018-05-23

## 2018-05-22 RX ORDER — CIPROFLOXACIN 2 MG/ML
400 INJECTION, SOLUTION INTRAVENOUS ONCE
Status: DISCONTINUED | OUTPATIENT
Start: 2018-05-22 | End: 2018-05-22 | Stop reason: HOSPADM

## 2018-05-22 RX ORDER — PROPOFOL 10 MG/ML
INJECTION, EMULSION INTRAVENOUS AS NEEDED
Status: DISCONTINUED | OUTPATIENT
Start: 2018-05-22 | End: 2018-05-22 | Stop reason: SURG

## 2018-05-22 RX ORDER — VANCOMYCIN HYDROCHLORIDE 1 G/200ML
1000 INJECTION, SOLUTION INTRAVENOUS ONCE
Status: DISCONTINUED | OUTPATIENT
Start: 2018-05-22 | End: 2018-05-22 | Stop reason: HOSPADM

## 2018-05-22 RX ORDER — ASPIRIN 81 MG/1
81 TABLET, CHEWABLE ORAL DAILY
Status: DISCONTINUED | OUTPATIENT
Start: 2018-05-22 | End: 2018-05-26 | Stop reason: HOSPADM

## 2018-05-22 RX ORDER — PRAVASTATIN SODIUM 40 MG
40 TABLET ORAL
Status: DISCONTINUED | OUTPATIENT
Start: 2018-05-23 | End: 2018-05-26 | Stop reason: HOSPADM

## 2018-05-22 RX ORDER — SODIUM CHLORIDE 9 MG/ML
INJECTION, SOLUTION INTRAVENOUS CONTINUOUS PRN
Status: DISCONTINUED | OUTPATIENT
Start: 2018-05-22 | End: 2018-05-22 | Stop reason: SURG

## 2018-05-22 RX ADMIN — ONDANSETRON 4 MG: 2 INJECTION INTRAMUSCULAR; INTRAVENOUS at 09:18

## 2018-05-22 RX ADMIN — SODIUM CHLORIDE 1 MG/HR: 0.9 INJECTION, SOLUTION INTRAVENOUS at 08:35

## 2018-05-22 RX ADMIN — PROTAMINE SULFATE 10 MG: 10 INJECTION, SOLUTION INTRAVENOUS at 09:18

## 2018-05-22 RX ADMIN — MIDAZOLAM 2 MG: 1 INJECTION INTRAMUSCULAR; INTRAVENOUS at 07:31

## 2018-05-22 RX ADMIN — PROPOFOL 50 MG: 10 INJECTION, EMULSION INTRAVENOUS at 07:46

## 2018-05-22 RX ADMIN — ROCURONIUM BROMIDE 50 MG: 10 INJECTION INTRAVENOUS at 07:46

## 2018-05-22 RX ADMIN — PROTAMINE SULFATE 10 MG: 10 INJECTION, SOLUTION INTRAVENOUS at 09:17

## 2018-05-22 RX ADMIN — PROTAMINE SULFATE 10 MG: 10 INJECTION, SOLUTION INTRAVENOUS at 09:20

## 2018-05-22 RX ADMIN — IOHEXOL 66 ML: 350 INJECTION, SOLUTION INTRAVENOUS at 09:38

## 2018-05-22 RX ADMIN — ASPIRIN 81 MG 81 MG: 81 TABLET ORAL at 12:09

## 2018-05-22 RX ADMIN — SODIUM CHLORIDE 6 MG/HR: 9 INJECTION, SOLUTION INTRAVENOUS at 10:00

## 2018-05-22 RX ADMIN — LIDOCAINE HYDROCHLORIDE 50 ML: 10 INJECTION, SOLUTION INFILTRATION; PERINEURAL at 07:46

## 2018-05-22 RX ADMIN — MUPIROCIN 1 APPLICATION: 20 OINTMENT TOPICAL at 20:54

## 2018-05-22 RX ADMIN — FAMOTIDINE 20 MG: 20 TABLET, FILM COATED ORAL at 06:18

## 2018-05-22 RX ADMIN — CHLORHEXIDINE GLUCONATE 15 ML: 1.2 RINSE ORAL at 12:09

## 2018-05-22 RX ADMIN — FENTANYL CITRATE 50 MCG: 50 INJECTION, SOLUTION INTRAMUSCULAR; INTRAVENOUS at 07:31

## 2018-05-22 RX ADMIN — INSULIN HUMAN 5 UNITS: 100 INJECTION, SOLUTION PARENTERAL at 09:29

## 2018-05-22 RX ADMIN — SODIUM CHLORIDE, SODIUM GLUCONATE, SODIUM ACETATE, POTASSIUM CHLORIDE, MAGNESIUM CHLORIDE, SODIUM PHOSPHATE, DIBASIC, AND POTASSIUM PHOSPHATE 50 ML/HR: .53; .5; .37; .037; .03; .012; .00082 INJECTION, SOLUTION INTRAVENOUS at 10:00

## 2018-05-22 RX ADMIN — SODIUM CHLORIDE, SODIUM GLUCONATE, SODIUM ACETATE, POTASSIUM CHLORIDE, MAGNESIUM CHLORIDE, SODIUM PHOSPHATE, DIBASIC, AND POTASSIUM PHOSPHATE 50 ML/HR: .53; .5; .37; .037; .03; .012; .00082 INJECTION, SOLUTION INTRAVENOUS at 21:18

## 2018-05-22 RX ADMIN — HEPARIN SODIUM 11000 UNITS: 1000 INJECTION INTRAVENOUS; SUBCUTANEOUS at 08:58

## 2018-05-22 RX ADMIN — MUPIROCIN 1 APPLICATION: 20 OINTMENT TOPICAL at 12:09

## 2018-05-22 RX ADMIN — SODIUM CHLORIDE 4 UNITS/HR: 9 INJECTION, SOLUTION INTRAVENOUS at 10:00

## 2018-05-22 RX ADMIN — HEPARIN SODIUM 12 UNITS/KG/HR: 10000 INJECTION, SOLUTION INTRAVENOUS at 17:28

## 2018-05-22 RX ADMIN — SODIUM CHLORIDE, SODIUM GLUCONATE, SODIUM ACETATE, POTASSIUM CHLORIDE, MAGNESIUM CHLORIDE, SODIUM PHOSPHATE, DIBASIC, AND POTASSIUM PHOSPHATE 500 ML: .53; .5; .37; .037; .03; .012; .00082 INJECTION, SOLUTION INTRAVENOUS at 20:00

## 2018-05-22 RX ADMIN — PREDNISONE 60 MG: 20 TABLET ORAL at 06:17

## 2018-05-22 RX ADMIN — ACETAMINOPHEN 650 MG: 325 TABLET, FILM COATED ORAL at 14:37

## 2018-05-22 RX ADMIN — SODIUM CHLORIDE, SODIUM LACTATE, POTASSIUM CHLORIDE, AND CALCIUM CHLORIDE: .6; .31; .03; .02 INJECTION, SOLUTION INTRAVENOUS at 07:12

## 2018-05-22 RX ADMIN — NEOSTIGMINE METHYLSULFATE 3 MG: 1 INJECTION, SOLUTION INTRAMUSCULAR; INTRAVENOUS; SUBCUTANEOUS at 09:34

## 2018-05-22 RX ADMIN — GLYCOPYRROLATE 0.4 MG: 0.2 INJECTION, SOLUTION INTRAMUSCULAR; INTRAVENOUS at 09:34

## 2018-05-22 RX ADMIN — Medication 5 UNITS/HR: at 08:54

## 2018-05-22 RX ADMIN — VANCOMYCIN HYDROCHLORIDE 1 G: 1 INJECTION, POWDER, LYOPHILIZED, FOR SOLUTION INTRAVENOUS at 08:39

## 2018-05-22 RX ADMIN — PROTAMINE SULFATE 10 MG: 10 INJECTION, SOLUTION INTRAVENOUS at 09:15

## 2018-05-22 RX ADMIN — DIPHENHYDRAMINE HCL 50 MG: 25 TABLET ORAL at 06:18

## 2018-05-22 RX ADMIN — METOPROLOL TARTRATE 12.5 MG: 25 TABLET ORAL at 06:17

## 2018-05-22 RX ADMIN — WARFARIN SODIUM 2 MG: 1 TABLET ORAL at 18:17

## 2018-05-22 RX ADMIN — ACETAMINOPHEN 650 MG: 325 TABLET, FILM COATED ORAL at 06:20

## 2018-05-22 RX ADMIN — FENTANYL CITRATE 250 MCG: 50 INJECTION, SOLUTION INTRAMUSCULAR; INTRAVENOUS at 07:46

## 2018-05-22 RX ADMIN — POTASSIUM CHLORIDE 20 MEQ: 200 INJECTION, SOLUTION INTRAVENOUS at 11:21

## 2018-05-22 RX ADMIN — CHLORHEXIDINE GLUCONATE 15 ML: 1.2 RINSE ORAL at 18:17

## 2018-05-22 RX ADMIN — VANCOMYCIN HYDROCHLORIDE 1000 MG: 1 INJECTION, SOLUTION INTRAVENOUS at 20:53

## 2018-05-22 RX ADMIN — SODIUM CHLORIDE: 0.9 INJECTION, SOLUTION INTRAVENOUS at 08:23

## 2018-05-22 NOTE — ANESTHESIA PROCEDURE NOTES
Introducer/[place]-Jc  Performed by: Freda Hargrove by: Neeru Amend   Date/Time: 5/22/2018 8:23 AM  Indications: vascular access  Location details: left internal jugular  Catheter size: 9 Fr  Patient position: Trendelenburg  Anesthesia: see MAR for details (GA)    Sedation:  Patient sedated: yes (GA)  Vitals: Vital signs were monitored during sedation  Assessment: blood return through all ports and free fluid flow  Skin prep agent dried: skin prep agent completely dried prior to procedure  Sterile barriers: all five maximum sterile barriers used - cap, mask, sterile gown, sterile gloves, and large sterile sheet  Hand hygiene: hand hygiene performed prior to central venous catheter insertion  Ultrasound guidance: yes  Consent: Verbal consent obtained  Written consent obtained  Risks and benefits: risks, benefits and alternatives were discussed  Consent given by: patient  Patient understanding: patient states understanding of the procedure being performed  Patient consent: the patient's understanding of the procedure matches consent given  Procedure consent: procedure consent matches procedure scheduled  Imaging studies: imaging studies available  Required items: required blood products, implants, devices, and special equipment available  Patient identity confirmed: arm band and verbally with patient  Time out: Immediately prior to procedure a "time out" was called to verify the correct patient, procedure, equipment, support staff and site/side marked as required  Reason All Sterile Barriers Not Used: All elements of maximal sterile barrier technique not followed for medical reasons  Pre-procedure: landmarks identified  Number of attempts: 2  Successful placement: yes  Post-procedure: line sutured and dressing applied  Patient tolerance: Patient tolerated the procedure well with no immediate complications  Comments: Right IJ attempted prior to Left IJ  Unable to pass wire through Right, resistance met  Proceed to Left, similar issue  Glidewire and Fluoro utilized for guidance  Easy passage using glide, Dr Dianne Mcelroy present and assisted in placement of MINE

## 2018-05-22 NOTE — ANESTHESIA POSTPROCEDURE EVALUATION
Post-Op Assessment Note      CV Status:  Stable    Mental Status:  Lethargic    Hydration Status:  Stable    PONV Controlled:  None    Airway Patency:  Patent and adequate  Airway: intubated    Post Op Vitals Reviewed: Yes          Staff: CRNA, other anesthesia staff       Comments: vss   patient fully monitored throughout transport to ICU  ETT hand off to RT on vent now  Patient status unchanged from OR  Nicardipine started in route  Report to AP and RN              BP   126/56   Temp      Pulse 69   Resp 24   SpO2 100

## 2018-05-22 NOTE — ANESTHESIA PROCEDURE NOTES
Arterial Line Insertion  Date/Time: 5/22/2018 9:42 AM  Performed by: Sheila Rogers by: Veronica La   Consent: Verbal consent obtained  Written consent obtained  Risks and benefits: risks, benefits and alternatives were discussed  Consent given by: patient  Patient understanding: patient states understanding of the procedure being performed  Patient consent: the patient's understanding of the procedure matches consent given  Procedure consent: procedure consent matches procedure scheduled  Required items: required blood products, implants, devices, and special equipment available  Patient identity confirmed: verbally with patient and arm band  Time out: Immediately prior to procedure a "time out" was called to verify the correct patient, procedure, equipment, support staff and site/side marked as required  Preparation: Patient was prepped and draped in the usual sterile fashion  Indications: multiple ABGs and hemodynamic monitoring  Orientation:  LeftLocation: brachial artery  Anesthesia: local infiltration    Anesthesia:  Local Anesthetic: lidocaine 1% without epinephrine  Anesthetic total: 1 mL    Sedation:  Patient sedated: yes  Sedation type: anxiolysis  Sedatives: see MAR for details, fentanyl and midazolam  Vitals: Vital signs were monitored during sedation    Mendoza's test normal: yes  Needle gauge: 20 (4fr)  Seldinger technique: Seldinger technique used  Number of attempts: 2  Post-procedure: chlorhexidine patch applied  Post-procedure CNS: normal and unchanged  Patient tolerance: Patient tolerated the procedure well with no immediate complications

## 2018-05-22 NOTE — ANESTHESIA PROCEDURE NOTES
Procedure Performed: LORA Anesthesia  Start Time:  5/22/2018 8:39 AM  End Time:   5/22/2018 9:40 AM      Preanesthesia Checklist    Patient identified, IV assessed, risks and benefits discussed, monitors and equipment assessed, procedure being performed at surgeon's request and anesthesia consent obtained  Procedure    Diagnostic Indications for LORA:  assessment of surgical repair and hemodynamic monitoring  Type of LORA: interventional LORA with real time guidance of intracardiac procedure  Images Saved: ultrasound permanent image saved  Physician Requesting Echo: Evelia Appiah  Location performed: OR  Intubated  Bite block not placed  Heart visualized  Insertion of LORA Probe:  Atraumatic  Probe Type:  Multiplane  Modalities:  3D, color flow mapping and continuous wave Doppler  Echocardiographic and Doppler Measurements    PREPROCEDURE    LEFT VENTRICLE:  Systolic Function: severely impaired  Ejection Fraction: 35%  Cavity size: dilated  RIGHT VENTRICLE:  Systolic Function: severely impaired  Cavity size moderately dilated  No hypertrophy              AORTIC VALVE:  Leaflets: trileaflet  Leaflet motions restricted  Stenosis: severe  Mean Gradient: 51 mmHg  Peak Gradient: 26 mmHg  Regurgitation: mild  MITRAL VALVE:  Leaflets: mechanical valve  Leaflet Motions: normal  Regurgitation: none  TRICUSPID VALVE:  Leaflets: normal  Leaflet Motions: normal  Stenosis: none  Regurgitation: trace  PULMONIC VALVE:  Leaflets: unable to visualize heart  ASCENDING AORTA:  Size:  normal  Dissection not present  AORTIC ARCH:  Size:  normal  dissection not present  Grade 1: normal to mild intimal thickening              RIGHT ATRIUM:  Size:  normal  No spontaneous echo contrast     LEFT ATRIUM:  Size: normal  No spontaneous echo contrast     LEFT ATRIAL APPENDAGE:  Size: normal  No spontaneous echo contrast         ATRIAL SEPTUM:  Intra-atrial septal morphology: normal          VENTRICULAR SEPTUM:  Intra-ventricular septum morphology: normal              OTHER FINDINGS:  Pericardium:  normal  Pleural Effusion:  none  POSTPROCEDURE    LEFT VENTRICLE: Unchanged   RIGHT VENTRICLE: Unchanged   AORTIC VALVE:   Leaflets: bioprosthetic  Stenosis: none  Mean Gradient: 5 mmHg  Regurgitation: none  MITRAL VALVE: Unchanged   TRICUSPID VALVE: Unchanged   ATRIA: Unchanged   AORTA: Unchanged   REMOVAL:  Probe Removal: atraumatic

## 2018-05-22 NOTE — OP NOTE
OPERATIVE REPORT  PATIENT NAME: Ankita Recinos    :  1945  MRN: [id number]  Pt Location: [facility] OR ROOM 02    SURGERY DATE: 2018    SURGEON: Guadlupe Galeazzi, MD     ASSISTANT: Reyna Mixon DO     CO-SURGEON: Dr Sindhu Lin DIAGNOSIS Symptomatic severe aortic stenosis  POSTOPERATIVE DIAGNOSIS Symptomatic severe aortic stenosis  NYHA CLASS: 3    CCS CLASS: 3    PROCEDURE Transcatheter aortic valve replacement with a 23 mm Sibley KOKO 3 bioprosthetic valve via a percutaneous left transfemoral approach  ANESTHESIA Dr Mariza Cartagena, general endotracheal anesthesia with transesophageal echocardiogram guidance  CARDIOPULMONARY BYPASS TIME 0  PACKS/TUBES/DRAINS None  ESTIMATED BLOOD LOSS: 35 mL    OPERATIVE TECHNIQUE The patient was taken to the operating room and placed supine on the operating table  Following the satisfactory induction of general anesthesia and placement of monitoring lines, the patient was prepped and draped in the usual sterile fashion  A time-out procedure was performed  The right common femoral artery was accessed percutaneously using Seldinger technique and fluoroscopy  The right common femoral vein was accessed in a similar fashion and was cannulated with a 6 Western Jeanie sheath  The femoral artery was cannulated with a 7 Arabic sheath  A pigtail catheter was inserted and advanced through the right common femoral artery sheath into the right coronary cusp  Using a series of injections, the angle of deployment was determined  Through the right common femoral vein sheath, a balloon tip temporary pacing catheter was inserted into the right ventricle and its capture was confirmed  The left common femoral artery was accessed percutaneously using Seldinger technique and fluoroscopy  Two (2) Perclose sutures were deployed in the standard fashion The patient was systemically heparinized   The left common femoral artery was then accessed with an extra-stiff wire and the sheath was inserted through the left common femoral artery and advanced into the aorta  The aortic valve was crossed with a wire  The balloon was inserted through the sheath and positioned in the aortic annulus  During an episode of rapid pacing, balloon valvuloplasty was performed  The balloon was subsequently removed  A 23 mm KOKO 3 valve was then advanced through the sheath into the aorta and positioned onto the deployment balloon in the aorta and then advanced around the aortic arch and through the annulus of the aortic valve to the appropriate level  At this point, the catheter was desheathed in the standard fashion  The valve was positioned appropriately using a combination of fluoroscopy and transesophageal echocardiogram guidance  During an episode of rapid pacing, balloon deployment of the 23 mm KOKO 3 valve was performed  Following deployment, the position of the valve was confirmed by fluoroscopy and echocardiography and its position appeared appropriate with no perivalvular leak  The valve delivery system was subsequently removed and the sheath was removed from the left common femoral artery while the Perclose sutures were secured and direct pressure was held  Protamine was administered with normalization of the ACT  Pressure was released from the left groin and there was no active bleeding and no evidence of hematoma development  The common femoral artery sheath was removed from the right following deployment of a closure device  The common femoral vein sheath was removed from the right and pressure was held  Sponge, needle, and instrument counts were reported as correct by the nursing staff  There was no qualified surgical resident available to assist  As the attending surgeon, I was present and scrubbed for all critical portions of this procedure   Final transesophageal echocardiogram demonstrated a well-positioned bioprosthetic transcatheter aortic valve with normal function and no perivalvular leak           SIGNATURECoalmita Marroquin MD  DATE: May 22, 2018  TIME: 9:29 AM

## 2018-05-22 NOTE — CONSULTS
Consult - Cardiothoracic [address] [place] 81 y o  female MRN: [id number]  Unit/Bed#: [facility] 413-01 Encounter: [id number]      Physician Requesting Consult: Faye Christianson DO    Reason for Consult / Principal Problem: Aortic valve stenosis    HPI: Nadine Fong is a 81 y o  female who presents to B for coumadin to heparin bridge regarding severe AS prior to TAVR  It was felt to be a high risk candidate for traditional AVR and was worked up for TAVR  Pt presents to the ICU post-op day # 0 TF TAVR in stable condition  Cardene @6  Insulin gtt    Left brachial vito  Left IJ philip cath    History obtained from chart review due to patient being intubated and sedated  PMH:   Past Medical History:   Diagnosis Date    Aortic valvar stenosis     CAD (coronary artery disease) of bypass graft     Carotid stenosis, asymptomatic, bilateral 04/2018    50-69% b/l    Chronic [facility]congestive [facility] failure) [facility])     Compression deformity of vertebra     L2    DM2 (diabetes mellitus, type 2) ([facility])     oral controlled    H/O Hodgkin's lymphoma     History of cervical cancer     History of ischemic cardiomyopathy     History of uterine cancer     HLD (hyperlipidemia)     HTN (hypertension)     Iliac artery stenosis, right (HCC)     common, 50%    LBBB (left bundle branch block)     Mitral regurgitation     PAD (peripheral artery disease) ([facility])     Renal artery stenosis ([facility])     s/p renal artery stent    SSS (sick sinus syndrome) ([facility])     s/p ppm       PSH:   Past Surgical History:   Procedure Laterality Date    APPENDECTOMY      CARDIAC PACEMAKER PLACEMENT      CARDIAC SURGERY  09/2007    MV Repair, CABG x 2 by Dr Elisa Jin @ [address] [facility]  01/2008    Redo Sternotomy, MVR #25mm St  Victor Manuel Mechanical    CHOLECYSTECTOMY      COLECTOMY      RENAL ARTERY STENT      TONSILLECTOMY         Family History: History reviewed  No pertinent family history      Social History:   History Smoking Status    Former Smoker    Years: 15 00    Types: Cigarettes    Quit date: 2016   Smokeless Tobacco    Never Used    History   Alcohol Use No      Marital Status: /Civil Union    ROS: ROS unable to be obtained secondary to intubation and sedation  Allergies: Allergies   Allergen Reactions    Contrast [Iodinated Diagnostic Agents] Shortness Of Breath and Throat Swelling    Ranolazine Shortness Of Breath and Vomiting     ranexa    Codeine GI Intolerance     vomit    Penicillin G Hives    Penicillins Throat Swelling    Sulfa Antibiotics GI Intolerance and Throat Swelling       Home Medications:   Prior to Admission medications    Medication Sig Start Date End Date Taking?  Authorizing Provider   aspirin 81 mg chewable tablet Chew 1 tablet (81 mg total) daily 5/4/18  Yes Saran Long PA-C   cholecalciferol (VITAMIN D3) 1,000 units tablet Take 2 tablets (2,000 Units total) by mouth daily 5/4/18  Yes Saran Long PA-C   clopidogrel (PLAVIX) 75 mg tablet Take 1 tablet (75 mg total) by mouth daily 5/4/18  Yes Saran Long PA-C   diphenoxylate-atropine (LOMOTIL) 2 5-0 025 mg per tablet Take 1 tablet by mouth 4 (four) times a day as needed   Yes Historical Provider, MD   furosemide (LASIX) 40 mg tablet Take 1 tablet (40 mg total) by mouth daily 5/4/18  Yes Saran Long PA-C   loperamide (IMODIUM) 2 mg capsule Take 2 capsules (4 mg total) by mouth 4 (four) times a day as needed for diarrhea 5/4/18  Yes Saran Long PA-C   nitroglycerin (NITROSTAT) 0 6 mg SL tablet Place 0 6 mg under the tongue every 5 (five) minutes as needed for chest pain   Yes Historical Provider, MD   pantoprazole (PROTONIX) 40 mg tablet Take 1 tablet (40 mg total) by mouth daily 5/4/18  Yes Saran Long PA-C   pravastatin (PRAVACHOL) 20 mg tablet Take 2 tablets (40 mg total) by mouth daily at bedtime 5/4/18  Yes Saran Long PA-C   traMADol (ULTRAM) 50 mg tablet Take 50 mg by mouth every 6 (six) hours as needed for moderate pain   Yes Historical Provider, MD   warfarin (COUMADIN) 2 mg tablet Take 2mg daily at bedtime unless otherwise directed starting 5/5/18 5/4/18  Yes Carmel Carias PA-C       Inpatient Medications:  Scheduled Meds:  Current Facility-Administered Medications:  acetaminophen 650 mg Rectal Q4H PRN Tyrel Diego, DO   acetaminophen 650 mg Oral Q4H PRN Tyrel Diego, DO   aspirin 81 mg Oral Daily Tyrel Diego, DO   atorvastatin 20 mg Oral QPM Jose R Sutton DO   bisacodyl 10 mg Rectal Daily PRN Tyrel Diego DO   calcium chloride 1 g Intravenous Once Tyrel Diego DO   [START ON 5/23/2018] carvedilol 3 125 mg Oral BID With Meals Tyrel Diego DO   chlorhexidine 15 mL Swish & Spit BID Jose R Sutton DO   fentanyl citrate (PF) 50 mcg Intravenous Once Tyrel Diego DO   fentanyl citrate (PF) 50 mcg Intravenous Q1H PRN Tyrel Diego DO   [START ON 5/23/2018] heparin (porcine) 5,000 Units Subcutaneous Q8H [address], DO   insulin lispro 1-5 Units Subcutaneous TID AC Jose R Sutton DO   insulin lispro 1-5 Units Subcutaneous HS Tyrel Diego DO   insulin regular (HumuLIN R,NovoLIN R) infusion 0 3-21 Units/hr Intravenous Titrated Tyrel Diego DO   multi-electrolyte 50 mL/hr Intravenous Continuous Jose R Sutton DO   mupirocin 1 application Nasal [address] Jose R Sutton DO   niCARdipine 1-15 mg/hr Intravenous Titrated Tyrel Diego DO   ondansetron 4 mg Intravenous Q6H PRN Jose R Sutton DO   pantoprazole 40 mg Oral Daily Before Breakfast Tyrel Diego DO   polyethylene glycol 17 g Oral Daily Jose R Sutton DO   potassium chloride 20 mEq Intravenous Once PRN Tyrel Diego,    potassium chloride 20 mEq Intravenous Q1H PRN Jose R Sutton,    potassium chloride 20 mEq Intravenous Q30 Min PRN Tyrel Diego DO   [START ON 5/23/2018] pravastatin 40 mg Oral HS Tyrel Diego DO vancomycin 1,000 mg Intravenous Q12H Jose R Sutton DO   warfarin 2 mg Oral Once (warfarin) Rachna Candelaria DO     Continuous Infusions:  insulin regular (HumuLIN R,NovoLIN R) infusion 0 3-21 Units/hr   multi-electrolyte 50 mL/hr   niCARdipine 1-15 mg/hr     PRN Meds:    acetaminophen 650 mg Q4H PRN   acetaminophen 650 mg Q4H PRN   bisacodyl 10 mg Daily PRN   fentanyl citrate (PF) 50 mcg Q1H PRN   ondansetron 4 mg Q6H PRN   potassium chloride 20 mEq Once PRN   potassium chloride 20 mEq Q1H PRN   potassium chloride 20 mEq Q30 Min PRN       VTE Pharmacologic Prophylaxis: Warfarin (Coumadin)  VTE Mechanical Prophylaxis: sequential compression device    Invasive lines and devices: Invasive Devices     Central Venous Catheter Line            Introducer 18 less than 1 day          Peripheral Intravenous Line            Peripheral IV 18 Right Antecubital 2 days          Arterial Line            Arterial Line 18 Left Brachial less than 1 day          Drain            Urethral Catheter Non-latex; Temperature probe 16 Fr  less than 1 day          Airway            ETT  Cuffed;Oral;Inflated; Hi-Lo 7 mm less than 1 day                Vitals:   Vitals:    18 1908 18 2326 18 0313 18 0900   BP: 124/56 127/60 119/53    BP Location: Left arm Left arm Left arm    Pulse: 70 70 70    Resp: 18 18 18    Temp: 98 3 °F (36 8 °C) 98 2 °F (36 8 °C) 98 2 °F (36 8 °C)    TempSrc: Oral Oral Oral    SpO2: 97% 95% 94% (P) 100%   Weight:       Height:                 Temperature: Temp (24hrs), Av 1 °F (36 7 °C), Min:97 8 °F (36 6 °C), Max:98 3 °F (36 8 °C)  Current: Temperature: 98 2 °F (36 8 °C)    Weights: IBW: 43 2 kg  Body mass index is 23 95 kg/m²  Hemodynamic Monitoring:      Respiratory/Ventilator Settings:   Vent Mode: (P) AC/VC      16/450/60%/5              SpO2: SpO2: (P) 100 %    Physical Exam:    Constitutional: Appears well-developed and well-nourished     HENT: Normocephalic and atraumatic  Intubated  Right small hematoma anterior to SCM  Eyes: No scleral icterus  Neck: Neck supple  Patient is intubated  Cardiovascular: reg rate and paced rhythm  Pulmonary/Chest: Breath sounds clear bilaterally  No wheezes or rales  Abdominal: Soft  No distension  B/L groins without hematoma  Ecchymosis across RLQ  Musculoskeletal: no edema  Neurological: Patient is sedated  Skin: Skin is warm and dry  No rash noted  Psychiatric: Patient is sedated and intubated  PV: + DP pulses bilaterally  Labs:     Results from last 7 days  Lab Units 18  0918  0839 18  0618  0518  1106   WBC Thousand/uL  --   --  5 60 7 05 6 93   HEMOGLOBIN g/dL  --   --  10 7* 9 9* 10 8*   I STAT HEMOGLOBIN g/dl 8 2* 9 2*  --   --   --    HEMATOCRIT %  --   --  34 6* 31 3* 35 0   PLATELETS Thousands/uL  --   --  210 196 222   NEUTROS PCT %  --   --   --   --  75   MONOS PCT %  --   --   --   --  6       Results from last 7 days  Lab Units 18  0927 18  0839 18  0618   SODIUM mmol/L  --   --  139 139 140   POTASSIUM mmol/L  --   --  3 7 3 7 3 9   CHLORIDE mmol/L  --   --  105 107 108   CO2 mmol/L  --   --  28 27 27   BUN mg/dL  --   --  21 20 23   CREATININE mg/dL  --   --  1 10 1 00 1 14   CALCIUM mg/dL  --   --  8 9 8 7 8 8   GLUCOSE RANDOM mg/dL  --   --  103 100 117   GLUCOSE, ISTAT mg/dl 245* 205*  --   --   --        Results from last 7 days  Lab Units 18  0454 18  0050 18  1757 18  0618  05   INR  1 47*  --   --  2 00* 3 45*   PTT seconds  --  77* 57*  --   --        /35 8/170/23 9/100%  CXR: lines and tubes in good position  b/l breast implants  I have personally reviewed pertinent films in PACS  EKG: paced at 70bpm  This was personally reviewed by myself       Impression:  Patient Active Problem List   Diagnosis    Nonrheumatic aortic valve stenosis    Coronary artery disease involving coronary bypass graft of native heart without angina pectoris    Chronic [facility]congestive [facility]) [facility] [address] [facility]    DM2 (diabetes mellitus, type 2) ([facility] )    Essential hypertension    HLD (hyperlipidemia)    History of ischemic cardiomyopathy    H/O mitral valve replacement with mechanical valve    Aortic valve stenosis    Anemia       Plan:    Neuro: D/C sedation  PRN dilaudid and percocet for pain  CV: Wean cardene drip as able  MAP goal >65  SBP goal <130  CI>2 2  Volume resuscitation as needed  Monitor rhythm  Lung: Check STAT post-op ABG and CXR  Wean vent to extubate  GI: GI prophylaxis  FEN: NPO  Replete K >4 0 and Mag >2 0  : Luke  Monitor UOP with goal >40/hour  ID: Prophylactic post-op abx  Maintain normothermia  Heme: Check STAT post-op H/H and platelets  Monitor incision site and  invasive lines for bleeding  Monitor abd ecchymosis  Endo: Insulin gtt    Peripheral Vascular: Monitor distal pulses Q 1 hour  Disposition: ICU Care    Counseling / Coordination of Care  Total Critical Care time spent 26 minutes excluding procedures, teaching and family updates        SIGNATURE: Yas Abarca PA-C  DATE: May 22, 2018  TIME: 9:56 AM

## 2018-05-22 NOTE — ANESTHESIA PREPROCEDURE EVALUATION
Review of Systems/Medical History  Patient summary reviewed  Chart reviewed      Cardiovascular  EKG reviewed, Exercise tolerance (METS): <4,  Pacemaker/AICD (A-Paced), Hyperlipidemia, Hypertension , Valvular heart disease , aortic valve stenosis and mitral regurgitation, Valve replacement mitral valve  replacement, CAD , CAD status: 3VD, History of CABG, Cardiac stents  Dysrhythmias , PND, ULLOA, PVD,   Comment: Sick sinus syndrome status post pacemaker, chronic diastolic heart failure, 2v CABG and mitral valve repair in September 2007, re-do sternotomy and mitral valve replacement in January 2008, since status post stent to SVG, EF 45%    ,  Pulmonary  Smoker ( 15 pk-yr, quit 2 years ago) ,        GI/Hepatic         Comment: Status post renal artery stenting     Endo/Other  Diabetes well controlled type 2 Diet controlled,      GYN      Comment: Hx of cervical and uterine cancer     Hematology      Comment: Hodgkin's lymphoma in remission    Coumadin therapy,currently admitted for Heparin Bridge Musculoskeletal       Neurology   Psychology           Physical Exam    Airway    Mallampati score: I  TM Distance: >3 FB  Neck ROM: full     Dental       Cardiovascular      Pulmonary      Other Findings        Anesthesia Plan  ASA Score- 4     Anesthesia Type- general with ASA Monitors  Additional Monitors: pulmonary artery catheter, central venous line and arterial line  Airway Plan: ETT  Plan Factors-    Induction- intravenous  Postoperative Plan-     Informed Consent- Anesthetic plan and risks discussed with patient  I personally reviewed this patient with the CRNA  Discussed and agreed on the Anesthesia Plan with the CRNA  Charlotte Becerra LEFT VENTRICLE:  Systolic function was mildly reduced  Ejection fraction was estimated to be 45 %  There was moderate hypokinesis of the basal inferoseptal, entire inferior, and basal-mid inferolateral wall(s)  LEFT ATRIUM:  The atrium was mildly dilated       MITRAL VALVE:  A 25 mm mechanical prosthesis (St  [employer]) was present  The prosthesis was well-seated without stenosis and with mild central regurgitation  Mean transmitral gradient was 3 mmHg  AORTIC VALVE:  The valve was trileaflet  Leaflets exhibited markedly increased thickness, marked calcification, and moderately reduced cuspal separation  Transaortic velocity was increased due to valvular stenosis  Aortic valve perimeter and area measured 8 52 cm and 4 55 cm2, respectively  Other measurements included anteroposterior diameters of LVOT (20 mm), aortic valve annulus (21 mm),  aortic root (23 mm), sinotubular junction (21 mm) and ascending aorta (24 mm)  There was severe stenosis  There was mild to moderate regurgitation  Valve mean gradient was 30 mmHg  The aortic valve obstructive index (by VTI) was 0 26  Estimated aortic valve area (by VTI) was 0 82 cm squared  TRICUSPID VALVE:  There was moderate regurgitation  Estimated peak PA pressure was 50 mmHg  The findings suggest moderate pulmonary hypertension

## 2018-05-23 ENCOUNTER — APPOINTMENT (INPATIENT)
Dept: NON INVASIVE DIAGNOSTICS | Facility: HOSPITAL | Age: 73
DRG: 267 | End: 2018-05-23
Payer: MEDICARE

## 2018-05-23 ENCOUNTER — APPOINTMENT (INPATIENT)
Dept: RADIOLOGY | Facility: HOSPITAL | Age: 73
DRG: 267 | End: 2018-05-23
Payer: MEDICARE

## 2018-05-23 DIAGNOSIS — I35.0 SEVERE AORTIC STENOSIS: Primary | ICD-10-CM

## 2018-05-23 LAB
ANION GAP SERPL CALCULATED.3IONS-SCNC: 7 MMOL/L (ref 4–13)
APTT PPP: 73 SECONDS (ref 24–36)
ATRIAL RATE: 69 BPM
ATRIAL RATE: 69 BPM
ATRIAL RATE: 70 BPM
BUN SERPL-MCNC: 18 MG/DL (ref 5–25)
CALCIUM SERPL-MCNC: 8.4 MG/DL (ref 8.3–10.1)
CHLORIDE SERPL-SCNC: 109 MMOL/L (ref 100–108)
CO2 SERPL-SCNC: 27 MMOL/L (ref 21–32)
CREAT SERPL-MCNC: 1.14 MG/DL (ref 0.6–1.3)
ERYTHROCYTE [DISTWIDTH] IN BLOOD BY AUTOMATED COUNT: 14.6 % (ref 11.6–15.1)
GFR SERPL CREATININE-BSD FRML MDRD: 49 ML/MIN/1.73SQ M
GLUCOSE SERPL-MCNC: 111 MG/DL (ref 65–140)
GLUCOSE SERPL-MCNC: 112 MG/DL (ref 65–140)
GLUCOSE SERPL-MCNC: 146 MG/DL (ref 65–140)
GLUCOSE SERPL-MCNC: 149 MG/DL (ref 65–140)
GLUCOSE SERPL-MCNC: 153 MG/DL (ref 65–140)
GLUCOSE SERPL-MCNC: 154 MG/DL (ref 65–140)
GLUCOSE SERPL-MCNC: 159 MG/DL (ref 65–140)
GLUCOSE SERPL-MCNC: 187 MG/DL (ref 65–140)
GLUCOSE SERPL-MCNC: 201 MG/DL (ref 65–140)
HCT VFR BLD AUTO: 26.9 % (ref 34.8–46.1)
HGB BLD-MCNC: 8.4 G/DL (ref 11.5–15.4)
INR PPP: 1.47 (ref 0.86–1.17)
MAGNESIUM SERPL-MCNC: 2.1 MG/DL (ref 1.6–2.6)
MCH RBC QN AUTO: 29.1 PG (ref 26.8–34.3)
MCHC RBC AUTO-ENTMCNC: 31.2 G/DL (ref 31.4–37.4)
MCV RBC AUTO: 93 FL (ref 82–98)
P AXIS: 64 DEGREES
P AXIS: 66 DEGREES
P AXIS: 77 DEGREES
PLATELET # BLD AUTO: 156 THOUSANDS/UL (ref 149–390)
PMV BLD AUTO: 11.3 FL (ref 8.9–12.7)
POTASSIUM SERPL-SCNC: 3.9 MMOL/L (ref 3.5–5.3)
PR INTERVAL: 196 MS
PR INTERVAL: 225 MS
PR INTERVAL: 258 MS
PROTHROMBIN TIME: 17.9 SECONDS (ref 11.8–14.2)
QRS AXIS: 78 DEGREES
QRS AXIS: 79 DEGREES
QRS AXIS: 98 DEGREES
QRSD INTERVAL: 138 MS
QT INTERVAL: 483 MS
QT INTERVAL: 496 MS
QT INTERVAL: 513 MS
QTC INTERVAL: 518 MS
QTC INTERVAL: 532 MS
QTC INTERVAL: 554 MS
RBC # BLD AUTO: 2.89 MILLION/UL (ref 3.81–5.12)
SODIUM SERPL-SCNC: 143 MMOL/L (ref 136–145)
T WAVE AXIS: -82 DEGREES
T WAVE AXIS: 263 DEGREES
T WAVE AXIS: 266 DEGREES
VENTRICULAR RATE: 69 BPM
VENTRICULAR RATE: 69 BPM
VENTRICULAR RATE: 70 BPM
WBC # BLD AUTO: 12.35 THOUSAND/UL (ref 4.31–10.16)

## 2018-05-23 PROCEDURE — 80048 BASIC METABOLIC PNL TOTAL CA: CPT | Performed by: THORACIC SURGERY (CARDIOTHORACIC VASCULAR SURGERY)

## 2018-05-23 PROCEDURE — G8988 SELF CARE GOAL STATUS: HCPCS

## 2018-05-23 PROCEDURE — 82948 REAGENT STRIP/BLOOD GLUCOSE: CPT

## 2018-05-23 PROCEDURE — G8987 SELF CARE CURRENT STATUS: HCPCS

## 2018-05-23 PROCEDURE — 99231 SBSQ HOSP IP/OBS SF/LOW 25: CPT | Performed by: THORACIC SURGERY (CARDIOTHORACIC VASCULAR SURGERY)

## 2018-05-23 PROCEDURE — 97535 SELF CARE MNGMENT TRAINING: CPT

## 2018-05-23 PROCEDURE — 71045 X-RAY EXAM CHEST 1 VIEW: CPT

## 2018-05-23 PROCEDURE — 93005 ELECTROCARDIOGRAM TRACING: CPT

## 2018-05-23 PROCEDURE — 93010 ELECTROCARDIOGRAM REPORT: CPT | Performed by: INTERNAL MEDICINE

## 2018-05-23 PROCEDURE — 85027 COMPLETE CBC AUTOMATED: CPT | Performed by: THORACIC SURGERY (CARDIOTHORACIC VASCULAR SURGERY)

## 2018-05-23 PROCEDURE — 93306 TTE W/DOPPLER COMPLETE: CPT | Performed by: INTERNAL MEDICINE

## 2018-05-23 PROCEDURE — 83735 ASSAY OF MAGNESIUM: CPT | Performed by: THORACIC SURGERY (CARDIOTHORACIC VASCULAR SURGERY)

## 2018-05-23 PROCEDURE — 97166 OT EVAL MOD COMPLEX 45 MIN: CPT

## 2018-05-23 PROCEDURE — 85730 THROMBOPLASTIN TIME PARTIAL: CPT | Performed by: THORACIC SURGERY (CARDIOTHORACIC VASCULAR SURGERY)

## 2018-05-23 PROCEDURE — 94760 N-INVAS EAR/PLS OXIMETRY 1: CPT

## 2018-05-23 PROCEDURE — G8989 SELF CARE D/C STATUS: HCPCS

## 2018-05-23 PROCEDURE — 85610 PROTHROMBIN TIME: CPT | Performed by: THORACIC SURGERY (CARDIOTHORACIC VASCULAR SURGERY)

## 2018-05-23 PROCEDURE — 93306 TTE W/DOPPLER COMPLETE: CPT

## 2018-05-23 RX ORDER — SODIUM CHLORIDE, SODIUM GLUCONATE, SODIUM ACETATE, POTASSIUM CHLORIDE, MAGNESIUM CHLORIDE, SODIUM PHOSPHATE, DIBASIC, AND POTASSIUM PHOSPHATE .53; .5; .37; .037; .03; .012; .00082 G/100ML; G/100ML; G/100ML; G/100ML; G/100ML; G/100ML; G/100ML
500 INJECTION, SOLUTION INTRAVENOUS ONCE
Status: COMPLETED | OUTPATIENT
Start: 2018-05-23 | End: 2018-05-22

## 2018-05-23 RX ORDER — FUROSEMIDE 40 MG/1
40 TABLET ORAL DAILY
Status: DISCONTINUED | OUTPATIENT
Start: 2018-05-23 | End: 2018-05-26 | Stop reason: HOSPADM

## 2018-05-23 RX ORDER — WARFARIN SODIUM 1 MG/1
2 TABLET ORAL
Status: COMPLETED | OUTPATIENT
Start: 2018-05-23 | End: 2018-05-23

## 2018-05-23 RX ORDER — CLOPIDOGREL BISULFATE 75 MG/1
75 TABLET ORAL DAILY
Status: DISCONTINUED | OUTPATIENT
Start: 2018-05-23 | End: 2018-05-26 | Stop reason: HOSPADM

## 2018-05-23 RX ORDER — DOCUSATE SODIUM 100 MG/1
100 CAPSULE, LIQUID FILLED ORAL 2 TIMES DAILY
Status: DISCONTINUED | OUTPATIENT
Start: 2018-05-23 | End: 2018-05-26 | Stop reason: HOSPADM

## 2018-05-23 RX ORDER — PANTOPRAZOLE SODIUM 40 MG/1
40 TABLET, DELAYED RELEASE ORAL DAILY
Status: DISCONTINUED | OUTPATIENT
Start: 2018-05-24 | End: 2018-05-26 | Stop reason: HOSPADM

## 2018-05-23 RX ORDER — POTASSIUM CHLORIDE 20 MEQ/1
20 TABLET, EXTENDED RELEASE ORAL ONCE
Status: COMPLETED | OUTPATIENT
Start: 2018-05-23 | End: 2018-05-23

## 2018-05-23 RX ORDER — POTASSIUM CHLORIDE 750 MG/1
10 TABLET, EXTENDED RELEASE ORAL DAILY
Status: DISCONTINUED | OUTPATIENT
Start: 2018-05-23 | End: 2018-05-26 | Stop reason: HOSPADM

## 2018-05-23 RX ADMIN — ACETAMINOPHEN 650 MG: 325 TABLET, FILM COATED ORAL at 02:16

## 2018-05-23 RX ADMIN — MUPIROCIN 1 APPLICATION: 20 OINTMENT TOPICAL at 08:16

## 2018-05-23 RX ADMIN — POTASSIUM CHLORIDE 20 MEQ: 1500 TABLET, EXTENDED RELEASE ORAL at 05:07

## 2018-05-23 RX ADMIN — ACETAMINOPHEN 650 MG: 325 TABLET, FILM COATED ORAL at 20:27

## 2018-05-23 RX ADMIN — CARVEDILOL 3.12 MG: 3.12 TABLET, FILM COATED ORAL at 17:36

## 2018-05-23 RX ADMIN — CLOPIDOGREL BISULFATE 75 MG: 75 TABLET ORAL at 08:20

## 2018-05-23 RX ADMIN — CHLORHEXIDINE GLUCONATE 15 ML: 1.2 RINSE ORAL at 08:16

## 2018-05-23 RX ADMIN — VANCOMYCIN HYDROCHLORIDE 1000 MG: 1 INJECTION, SOLUTION INTRAVENOUS at 08:00

## 2018-05-23 RX ADMIN — TRAMADOL HYDROCHLORIDE 50 MG: 50 TABLET, FILM COATED ORAL at 13:01

## 2018-05-23 RX ADMIN — PANTOPRAZOLE SODIUM 40 MG: 40 TABLET, DELAYED RELEASE ORAL at 06:28

## 2018-05-23 RX ADMIN — FUROSEMIDE 40 MG: 40 TABLET ORAL at 08:20

## 2018-05-23 RX ADMIN — DOCUSATE SODIUM 100 MG: 100 CAPSULE, LIQUID FILLED ORAL at 17:36

## 2018-05-23 RX ADMIN — PRAVASTATIN SODIUM 40 MG: 40 TABLET ORAL at 22:00

## 2018-05-23 RX ADMIN — HEPARIN SODIUM 12 UNITS/KG/HR: 10000 INJECTION, SOLUTION INTRAVENOUS at 10:20

## 2018-05-23 RX ADMIN — WARFARIN SODIUM 2 MG: 1 TABLET ORAL at 17:37

## 2018-05-23 RX ADMIN — TRAMADOL HYDROCHLORIDE 50 MG: 50 TABLET, FILM COATED ORAL at 17:36

## 2018-05-23 RX ADMIN — CARVEDILOL 3.12 MG: 3.12 TABLET, FILM COATED ORAL at 07:59

## 2018-05-23 RX ADMIN — INSULIN LISPRO 1 UNITS: 100 INJECTION, SOLUTION INTRAVENOUS; SUBCUTANEOUS at 22:02

## 2018-05-23 RX ADMIN — ASPIRIN 81 MG 81 MG: 81 TABLET ORAL at 08:17

## 2018-05-23 NOTE — CASE MANAGEMENT
Continued Stay Review    Date: 05/23/2018    Vital Signs: /60 (BP Location: Left arm)   Pulse 75   Temp 98 5 °F (36 9 °C) (Oral)   Resp 18   Ht 4' 11" (1 499 m)   Wt 56 1 kg (123 lb 10 9 oz)   SpO2 98%   BMI 24 98 kg/m²     Medications:   Scheduled Meds:   Current Facility-Administered Medications:  acetaminophen 650 mg Oral Q4H PRN Mathew Para, DO    aspirin 81 mg Oral Daily Jose R Sutton, DO    bisacodyl 10 mg Rectal Daily PRN Mathew Para, DO    carvedilol 3 125 mg Oral BID With Meals Mathew Para, DO    clopidogrel 75 mg Oral Daily Maceymark Amanda, PA-SANTIAGO    docusate sodium 100 mg Oral BID Macey Amanda, CONNER    furosemide 40 mg Oral Daily Corey Hughes PA-C    heparin (porcine) 3-20 Units/kg/hr (Order-Specific) Intravenous Titrated Gladis Ornelas PA-C Last Rate: 12 Units/kg/hr (05/23/18 1020)   insulin lispro 1-5 Units Subcutaneous TID AC Corey Hughes PA-C    insulin lispro 1-5 Units Subcutaneous HS Macey CONNER Amanda    ondansetron 4 mg Intravenous Q6H PRN Mathew Para,     [START ON 5/24/2018] pantoprazole 40 mg Oral Daily Macey Vasiliy, PA-SANTIAGO    potassium chloride 10 mEq Oral Daily Macey CONNER Amanda    pravastatin 40 mg Oral HS Jose R Sutton,     traMADol 50 mg Oral Q6H PRN Lakeisha Solomon PA-C    warfarin 2 mg Oral Once (warfarin) Macey Amanda PA-C      Continuous Infusions:   heparin (porcine) 3-20 Units/kg/hr (Order-Specific) Last Rate: 12 Units/kg/hr (05/23/18 1020)     Abnormal Labs/Diagnostic Results:     Age/Sex: 81 y o  female     Assessment/Plan:   Assessment:      Patient Active Problem List   Diagnosis    Nonrheumatic aortic valve stenosis    Coronary artery disease involving coronary bypass graft of native heart without angina pectoris    [facility]ive [facility] failure) [facility] [address] [facility]    DM2 (diabetes mellitus, type 2) ([facility] )    Essential hypertension    HLD (hyperlipidemia)    History of ischemic cardiomyopathy    H/O mitral valve replacement with mechanical valve    Aortic valve stenosis    Anemia    S/P TAVR (transcatheter aortic valve replacement)         Aortic stenosis, Non-Rheumatic  S/P transfemoral transcatheter aortic valve replacement; POD # 1     Plan:     1  Cardiac:   A-Paced; HR/BP well-controlled  Coreg, 3 125 mg PO BID  Continue ASA and Statin therapy  Central IV access no longer required; Remove central venous catheter today  ASA/Plavix/Coumadin; INR 1 47, Coumadin, 2 mg PO today  Consult cardiology for postoperative medical management  Follow up transthoracic echocardiogram today  Continue DVT prophylaxis     2  Pulmonary:   Extubated  CXR findings: No effusion/pneumothorax  Good Room air oxygen saturation; Continue incentive spirometry/Coughing/Deep breathing exercises     3  Renal:   Intake/Output net: +1600 mL/24 hours  Post op Creatinine stable; Follow up labs prn              Resume preop Lasix 40 mg PO daily     4  Neuro:  Neurologically intact; No active issues  Incisional pain well-controlled; Continue prn Tylenol     5  GI:  Tolerating clear liquid diet, without evidence of dysphagia; Initiate TLC 2 3 gm sodium diet with consistent carbohydrate modifier  Maintain 1800 mL daily fluid restriction   Continue daily stool softeners and prn suppository  Continue GI prophylaxis     6  Endo:         No history of diabetes; Glucose well-controlled  Discontinue continuous insulin infusion and add Insulin sliding scale coverage     7   Disposition:  Transfer from ICU to telemetry today, Anticipate discharge to home , Follow up postoperative echocardiogram     VTE Pharmacologic Prophylaxis: Sequential compression device (Venodyne)  and Heparin  VTE Mechanical Prophylaxis: sequential compression device

## 2018-05-23 NOTE — OP NOTE
OPERATIVE REPORT  PATIENT NAME: Tea Reyes    :  1945  MRN: [id number]  Pt Location: [facility] 02    SURGERY DATE: 2018    SURGEON: Andres Rockwell MD     ASSISTANT: Christine Lay DO     CO-SURGEON: Tal Vasquez MD     PREOPERATIVE DIAGNOSIS Symptomatic severe aortic stenosis  POSTOPERATIVE DIAGNOSIS Symptomatic severe aortic stenosis  NYHA CLASS: 3    CCS CLASS: 3    PROCEDURE Transcatheter aortic valve replacement with a 23 mm Sibley KOKO 3 bioprosthetic valve via a percutaneous left transfemoral approach  ANESTHESIA Dr Marco Antonio Belcher  general endotracheal anesthesia with transesophageal echocardiogram guidance  CARDIOPULMONARY BYPASS TIME 0  PACKS/TUBES/DRAINS None  ESTIMATED BLOOD LOSS: 35 mL    OPERATIVE TECHNIQUE The patient was taken to the operating room and placed supine on the operating table  Following the satisfactory induction of general anesthesia and placement of monitoring lines, the patient was prepped and draped in the usual sterile fashion  A time-out procedure was performed  The right common femoral artery was accessed percutaneously using Seldinger technique and fluoroscopy  The right common femoral vein was accessed in a similar fashion and was cannulated with a 6 Western Jeanie sheath  The femoral artery was cannulated with a 7 Albanian sheath  A pigtail catheter was inserted and advanced through the right common femoral artery sheath into the right coronary cusp  Using a series of injections, the angle of deployment was determined  Through the right common femoral vein sheath, a balloon tip temporary pacing catheter was inserted into the right ventricle and its capture was confirmed  The bilateral common femoral artery was accessed percutaneously using Seldinger technique and fluoroscopy  Two (2) Perclose sutures were deployed in the standard fashion The patient was systemically heparinized   The left common femoral artery was then accessed with an extra-stiff wire and the sheath was inserted through the left common femoral artery and advanced into the aorta  The aortic valve was crossed with a wire  The balloon was inserted through the sheath and positioned in the aortic annulus  During an episode of rapid pacing, balloon valvuloplasty was performed  The balloon was subsequently removed  A 23 mm KOKO 3 valve was then advanced through the sheath into the aorta and positioned onto the deployment balloon in the aorta and then advanced around the aortic arch and through the annulus of the aortic valve to the appropriate level  At this point, the catheter was desheathed in the standard fashion  The valve was positioned appropriately using a combination of fluoroscopy and transesophageal echocardiogram guidance  During an episode of rapid pacing, balloon deployment of the 23 mm KOKO 3 valve was performed  Following deployment, the position of the valve was confirmed by fluoroscopy and echocardiography and its position appeared appropriate with no perivalvular leak  The valve delivery system was subsequently removed and the sheath was removed from the left common femoral artery while the Perclose sutures were secured and direct pressure was held  Protamine was administered with normalization of the ACT  Pressure was released from the right groin and there was no active bleeding and no evidence of hematoma development  The common femoral artery sheath was removed from the right following deployment of a closure device  The common femoral vein sheath was removed from the right and pressure was held  Sponge, needle, and instrument counts were reported as correct by the nursing staff  There was no qualified surgical resident available to assist  As the attending surgeon, I was present and scrubbed for all critical portions of this procedure   Final transesophageal echocardiogram demonstrated a well-positioned bioprosthetic transcatheter aortic valve with normal function and no perivalvular leak           SIGNATURE: Pardeep Allison MD  DATE: May 23, 2018  TIME: 4:46 AM

## 2018-05-23 NOTE — OCCUPATIONAL THERAPY NOTE
OccupationalTherapy Evaluation     Patient Name: Shruti DONALDSON Date: 5/23/2018  Problem List  Patient Active Problem List   Diagnosis    Nonrheumatic aortic valve stenosis    Coronary artery disease involving coronary bypass graft of native heart without angina pectoris    Chronic [facility]estive [facility] failure) [facility]    DM[facility]di[facility] mellitus[facility] 2) [facility]    Essential hypertension    HLD (hyperlipidemia)    History of ischemic cardiomyopathy    H/O mitral valve replacement with mechanical valve    Aortic valve stenosis    Anemia    S/P TAVR (transcatheter aortic valve replacement)     Past Medical History  Past Medical History:   Diagnosis Date    Aortic valvar stenosis     CAD (coronary artery disease) of bypass graft     Carotid stenosis, asymptomatic, bilateral 04/2018    50-69% b/l    [facility]estive [facility] failure) [facility])     Compression deformity of vertebra     L2    DM2 (diabetes mellitus, type 2) ([facility])     oral controlled    H/O Hodgkin's lymphoma     History of cervical cancer     History of ischemic cardiomyopathy     History of uterine cancer     HLD (hyperlipidemia)     HTN (hypertension)     Iliac artery stenosis, right (HCC)     common, 50%    LBBB (left bundle branch block)     Mitral regurgitation     PAD (peripheral artery disease) ([facility])     Renal artery stenosis (HCC)     s/p renal artery stent    SSS (sick sinus syndrome) ([facility] )     s/p ppm     Past Surgical History  Past Surgical History:   Procedure Laterality Date    APPENDECTOMY      CARDIAC PACEMAKER PLACEMENT      CARDIAC SURGERY  09/2007    MV Repair, CABG x 2 by Dr Carlin Boone @ [address] [place] [address]  01/2008    Redo Sternotomy, MVR #25mm St  [facility]    CHOLECYSTECTOMY      COLECTOMY      LA ECHO TRANSESOPHAG R-T 2D W/PRB IMG ACQUISJ I&R N/A 5/22/2018    Procedure: TRANSESOPHAGEAL ECHOCARDIOGRAM (LORA);   Surgeon: Parrish Raya DO;  Location: [facility];  Service: Cardiac Surgery    MO REPLACE AORTIC VALVE OPENFEMORAL ARTERY APPROACH N/A 5/22/2018    Procedure: REPLACEMENT AORTIC VALVE TRANSCATHETER (TAVR) TRANSFEMORAL W/ 23 MM HUGHES KOKO S3 VALVE (ACCESS ON LEFT); Surgeon: Carl Cesar DO;  Location: [facility];  Service: Cardiac Surgery    RENAL ARTERY STENT      TONSILLECTOMY           05/23/18 1233   Note Type   Note type Eval/Treat   Restrictions/Precautions   Weight Bearing Precautions Per Order No   Other Precautions Cardiac/sternal;Fall Risk;Multiple lines   Pain Assessment   Pain Assessment No/denies pain   Pain Score No Pain   Home Living   Type of Home House   Home Layout Two level   Bathroom Shower/Tub Tub/shower unit   Bathroom Toilet Standard   Home Equipment Cane   Prior Function   Level of [place] Independent with ADLs and functional mobility   Lives With Spouse; Son   Ozzy Help From Family   ADL Assistance Independent   IADLs Independent   Falls in the last 6 months 0   Vocational Retired   Lifestyle   Autonomy Pt I with ADLs and IADLs  No DME use  Reciprocal Relationships Pt lives w/ spouse and son who are able to assist as needed   Service to Others Retired   Intrinsic Gratification Pt active PTA- enjoys knitting     Psychosocial   Psychosocial (WDL) WDL   ADL   Where Assessed Chair   Eating Assistance 7  Independent   Grooming Assistance 5  Supervision/Setup   UB Bathing Assistance 5  Supervision/Setup   LB Bathing Assistance 5  Supervision/Setup   UB Dressing Assistance 5  Supervision/Setup   LB Dressing Assistance 5  Supervision/Setup   Toileting Assistance  5  Supervision/Setup   Functional Assistance 5  Supervision/Setup   Bed Mobility   Additional Comments Pt seated OOB upon arrival   Transfers   Sit to Stand 5  Supervision   Additional items Impulsive;Verbal cues   Stand to Sit 5  Supervision   Additional items Impulsive;Verbal cues   Toilet transfer 5  Supervision   Additional items Increased time required   Functional Mobility Additional items Rolling walker   Balance   Static Sitting Good   Dynamic Sitting Fair +   Static Standing Fair   Dynamic Standing Fair -   Activity Tolerance   Activity Tolerance Patient tolerated treatment well   Nurse Made Aware Okay to see per RN   RUE Assessment   RUE Assessment WFL   LUE Assessment   LUE Assessment WFL   Hand Function   Gross Motor Coordination Functional   Fine Motor Coordination Functional   Cognition   Overall Cognitive Status WFL   Arousal/Participation Alert; Responsive; Cooperative   Attention Attends with cues to redirect   Orientation Level Oriented X4   Memory Within functional limits   Following Commands Follows one step commands without difficulty   Comments Pt pleasant and agreeable to session  She can be impulsive w/ decreased safety awareness during mobility  Assessment   Limitation Decreased endurance;Decreased high-level ADLs   Prognosis Good   Assessment Pt is a 81 y o  female who was [facility] to [facility]19/2018 with Aortic valve stenosis  Pt is s/p TAVR on 5/23/18  Pt's PMH significant for CAD, CHF, compression deformity of vertebra (L2), DM2, Hodgkin's lymphoma, hx of cervical cancer, ischemic cardiomyopathy, HLD, HTN, PAD, s/p renal artery stent, SSS s/p ppm, hx of MV repair and CABG x2  At baseline pt was I with ADLs and IADLs  Pt lives w/ spouse and son in 2 level home w/ bed and bath upstairs  Currently pt requires S for overall ADLS and S for functional mobility/transfers w/ RW  Pt's current limitations include decreased endurance and decreased high level ADLs  Will f/u w/ 1x treatment session for review of cardiac education packet  Rec home w/ increased family support upon d/c  Goals   Patient Goals to go home   Short Term Goal #1 Pt will engage in cardiac education using the Recovering After Cardiac Rehabilitation packet w/ G participation and G carryover     Short Term Goal #2 Pt will demonstrate 100% carryover of precautions s/p review w/o cues w/ mod I w/ G tolerance/participation t/o functional ADL/IADL/leisure tasks   Plan   Treatment Interventions Cardiac education   OT Frequency Eval only   Additional Treatment Session   Start Time 1225   End Time 1233   Treatment Assessment Pt participated in additional OT session focusing on cardiac education  Provided pt with Recovering After Cardiac Surgery packet and educated pt regarding; cardiac precautions, lifiting restrictions, safe activity engagement, energy conservation, lifestyle modifications, stress management and cardiac rehabilitation programs  Educated pt on differences in restrictions/precautions w/ TAVR procedure  Pt's questions were addressed after discussion of the packet  Provided pt with contact information for OT department if questions arrise  Considering pt is functioning at a S level and has family support to assist as needed at home, rec home w/ increased family support upon d/c  Rec pt cont participation in ADLs and mobility w/ nrsg and restorative staff while in the hospital  Pt would benefit from PT consult for assessment of safety w/ stairs and need for RW  No further acute OT needs warranted at this time  D/C OT     Recommendation   Recommendation PT consult   OT Discharge Recommendation Home with family support   Barthel Index   Feeding 10   Bathing 0   Grooming Score 5   Dressing Score 10   Bladder Score 10   Bowels Score 10   Toilet Use Score 5   Transfers (Bed/Chair) Score 10   Mobility (Level Surface) Score 10   Stairs Score 0   Barthel Index Score 70   Modified [place] Scale   Modified [place] Scale 3       Farrah Altamirano OTR/L

## 2018-05-23 NOTE — PROGRESS NOTES
Progress Note - Cardiothoracic Surgery   Aiyana Mass 81 y o  female MRN: [id number]  Unit/Bed#: [facility] 413-01 Encounter: [id number]  Aortic stenosis, Non-Rheumatic  S/P transfemoral transcatheter aortic valve replacement; POD # 1    24 Hour Events: No events overnight  Heparin infusion started last night  Weaned to room air       Medications:   Scheduled Meds:  Current Facility-Administered Medications:  acetaminophen 650 mg Rectal Q4H PRN Melony Burgosr, DO    acetaminophen 650 mg Oral Q4H PRN Melony Burgosr, DO    aspirin 81 mg Oral Daily Jose R Sutton, DO    bisacodyl 10 mg Rectal Daily PRN Melony Tovar, DO    calcium chloride 1 g Intravenous Once Melony Tovar, DO    carvedilol 3 125 mg Oral BID With Meals Melony Tovar, DO    chlorhexidine 15 mL Swish & Spit BID Jose R Sutton, DO    fentanyl citrate (PF) 50 mcg Intravenous Once Melony Tovar, DO    fentanyl citrate (PF) 50 mcg Intravenous Q1H PRN Melony Tovar, DO    heparin (porcine) 3-20 Units/kg/hr (Order-Specific) Intravenous Titrated Teddy Flores PA-C Last Rate: 12 Units/kg/hr (05/22/18 1728)   insulin regular (HumuLIN R,NovoLIN R) infusion 0 3-21 Units/hr Intravenous Titrated Jose R Sutton, DO Last Rate: 0 5 Units/hr (05/23/18 0600)   mupirocin 1 application Nasal N61P [address] Jose R Sutton, DO    ondansetron 4 mg Intravenous Q6H PRN Jose R Sutton, DO    pantoprazole 40 mg Oral Daily Before Breakfast Jose R Sutton, DO    polyethylene glycol 17 g Oral Daily Jose R Sutton, DO    potassium chloride 20 mEq Intravenous Q1H PRN Jose R Sutton, DO    potassium chloride 20 mEq Intravenous Q30 Min PRN Melony Tovar, DO    pravastatin 40 mg Oral HS Jose R Sutton, DO    traMADol 50 mg Oral Q6H PRN Kieran Dumont PA-C    vancomycin 1,000 mg Intravenous Q12H Jose R Sutton, DO Last Rate: Stopped (05/23/18 0000)     Continuous Infusions:  heparin (porcine) 3-20 Units/kg/hr (Order-Specific) Last Rate: 12 Units/kg/hr (05/22/18 1728)   insulin regular (HumuLIN R,NovoLIN R) infusion 0 3-21 Units/hr Last Rate: 0 5 Units/hr (05/23/18 0600)     PRN Meds:   acetaminophen    acetaminophen    bisacodyl    fentanyl citrate (PF)    ondansetron    potassium chloride    potassium chloride    traMADol    Vitals:   Vitals:    05/23/18 0300 05/23/18 0400 05/23/18 0500 05/23/18 0600   BP: (!) 112/48 (!) 111/49     Pulse: 72 70 72 70   Resp: 18 16 19 19   Temp: 98 2 °F (36 8 °C) 98 2 °F (36 8 °C) 98 6 °F (37 °C)    TempSrc:  Probe     SpO2: 95% 98% 98% 98%   Weight:    56 1 kg (123 lb 10 9 oz)   Height:           Telemetry: A-Paced; Heart Rate: 70    Hemodynamics:       Respiratory:   SpO2: SpO2: 98 %, SpO2 Activity: SpO2 Activity: At Rest; Room Air    Intake/Output:   I/O       05/21 0701 - 05/22 0700 05/22 0701 - 05/23 0700    P  O  1150 240    I V  (mL/kg)  3034 2 (54 1)    IV Piggyback  200    Total Intake(mL/kg) 1150 (21 4) 3474 2 (61 9)    Urine (mL/kg/hr) 1100 (0 9) 1870 (1 4)    Total Output 1100 1870    Net +50 +1604 2                Weights:   Weight (last 2 days)     Date/Time   Weight    05/23/18 0600  56 1 (123 68)            Admit weight: 53 5    Results:     Results from last 7 days  Lab Units 05/23/18  0320 05/22/18  1737 05/22/18  1020 05/22/18  0955  05/21/18  0612 05/20/18  0512   WBC Thousand/uL 12 35*  --   --   --   --  5 60 7 05   HEMOGLOBIN g/dL 8 4* 9 1*  --  9 1*  --  10 7* 9 9*   I STAT HEMOGLOBIN g/dl  --   --  9 2*  --   < >  --   --    HEMATOCRIT % 26 9* 28 0*  --  27 9*  --  34 6* 31 3*   PLATELETS Thousands/uL 156  --   --  163  --  210 196   < > = values in this interval not displayed      Results from last 7 days  Lab Units 05/23/18  0320 05/22/18  1750  05/22/18  0955  05/21/18  0612   SODIUM mmol/L 143  --   --  138  --  139   POTASSIUM mmol/L 3 9 4 6  --  3 5  --  3 7   CHLORIDE mmol/L 109*  --   --  105  --  105   CO2 mmol/L 27  --   --  24  --  28   BUN mg/dL 18  --   --  21  -- 21   CREATININE mg/dL 1 14  --   --  1 16  --  1 10   GLUCOSE RANDOM mg/dL 154*  --   --  191*  --  103   GLUCOSE, ISTAT   --   --   < >  --   < >  --    CALCIUM mg/dL 8 4  --   --  8 3  --  8 9   < > = values in this interval not displayed  Results from last 7 days  Lab Units 05/23/18  0534 05/23/18  0320 05/22/18  2126 05/22/18  0958 05/22/18  0454 05/22/18  0050   INR  1 47*  --   --  1 64* 1 47*  --    PTT seconds  --  73* 60*  --   --  77*     Coumadin mg 2 2 0  0 0       Point of care glucose: 111-153    Studies:  CXR: Lungs well-expanded  No effusion/pneumothorax  EKG: A paced; no ischemic changes noted  Invasive Lines/Tubes:  Invasive Devices     Central Venous Catheter Line            Introducer 05/22/18 less than 1 day          Peripheral Intravenous Line            Peripheral IV 05/19/18 Right Antecubital 3 days          Drain            Urethral Catheter Non-latex; Temperature probe 16 Fr  less than 1 day                Physical Exam:    HEENT/NECK:  PERRLA  No jugular venous distention  Cardiac: Regular rate and rhythm  No rubs/murmurs/gallops  Pulmonary:  Breath sounds slightly diminished at the bases bilaterally  Abdomen:  Non-tender, Non-distended  Positive bowel sounds  Incisions: Inguinal incision dressed with Mepilex AG  No erythema or drainage  Lower extremities: Extremities warm/dry  Radial/PT/DP pulses 2+ bilaterally  Trace edema B/L  Neuro: Alert and oriented X 3  Sensation is grossly intact  No focal deficits  Skin: Warm/Dry, without rashes or lesions      Assessment:  Patient Active Problem List   Diagnosis    Nonrheumatic aortic valve stenosis    Coronary artery disease involving coronary bypass graft of native heart without angina pectoris    [facility]    DM[facility]di[facility] type 2) ([facility]    Essential hypertension    HLD (hyperlipidemia)    History of ischemic cardiomyopathy    H/O mitral valve replacement with mechanical valve    Aortic valve stenosis    Anemia    S/P TAVR (transcatheter aortic valve replacement)       Aortic stenosis, Non-Rheumatic  S/P transfemoral transcatheter aortic valve replacement; POD # 1    Plan:    1  Cardiac:   A-Paced; HR/BP well-controlled  Coreg, 3 125 mg PO BID  Continue ASA and Statin therapy  Central IV access no longer required; Remove central venous catheter today  ASA/Plavix/Coumadin; INR 1 47, Coumadin, 2 mg PO today  Consult cardiology for postoperative medical management  Follow up transthoracic echocardiogram today  Continue DVT prophylaxis    2  Pulmonary:   Extubated  CXR findings: No effusion/pneumothorax  Good Room air oxygen saturation; Continue incentive spirometry/Coughing/Deep breathing exercises    3  Renal:   Intake/Output net: +1600 mL/24 hours  Post op Creatinine stable; Follow up labs prn   Resume preop Lasix 40 mg PO daily    4  Neuro:  Neurologically intact; No active issues  Incisional pain well-controlled; Continue prn Tylenol    5  GI:  Tolerating clear liquid diet, without evidence of dysphagia; Initiate TLC 2 3 gm sodium diet with consistent carbohydrate modifier  Maintain 1800 mL daily fluid restriction   Continue daily stool softeners and prn suppository  Continue GI prophylaxis    6  Endo:    No history of diabetes; Glucose well-controlled  Discontinue continuous insulin infusion and add Insulin sliding scale coverage    7   Disposition:  Transfer from ICU to telemetry today, Anticipate discharge to home , Follow up postoperative echocardiogram    VTE Pharmacologic Prophylaxis: Sequential compression device (Venodyne)  and Heparin  VTE Mechanical Prophylaxis: sequential compression device    Collaborative rounds completed with BENJI Hurtado , and Paulette Curtis RN    SIGNATURE: Naldo Rivera PA-C  DATE: May 23, 2018  TIME: 6:58 AM

## 2018-05-24 PROBLEM — D69.6 THROMBOCYTOPENIA (HCC): Status: ACTIVE | Noted: 2018-05-24

## 2018-05-24 LAB
ABO GROUP BLD BPU: NORMAL
ANION GAP SERPL CALCULATED.3IONS-SCNC: 6 MMOL/L (ref 4–13)
APTT PPP: 84 SECONDS (ref 24–36)
BPU ID: NORMAL
BUN SERPL-MCNC: 20 MG/DL (ref 5–25)
CALCIUM SERPL-MCNC: 8.7 MG/DL (ref 8.3–10.1)
CHLORIDE SERPL-SCNC: 105 MMOL/L (ref 100–108)
CO2 SERPL-SCNC: 29 MMOL/L (ref 21–32)
CREAT SERPL-MCNC: 1.24 MG/DL (ref 0.6–1.3)
CROSSMATCH: NORMAL
ERYTHROCYTE [DISTWIDTH] IN BLOOD BY AUTOMATED COUNT: 15 % (ref 11.6–15.1)
GFR SERPL CREATININE-BSD FRML MDRD: 44 ML/MIN/1.73SQ M
GLUCOSE SERPL-MCNC: 102 MG/DL (ref 65–140)
GLUCOSE SERPL-MCNC: 106 MG/DL (ref 65–140)
GLUCOSE SERPL-MCNC: 142 MG/DL (ref 65–140)
GLUCOSE SERPL-MCNC: 99 MG/DL (ref 65–140)
GLUCOSE SERPL-MCNC: 99 MG/DL (ref 65–140)
HCT VFR BLD AUTO: 27.6 % (ref 34.8–46.1)
HGB BLD-MCNC: 8.8 G/DL (ref 11.5–15.4)
INR PPP: 1.75 (ref 0.86–1.17)
MCH RBC QN AUTO: 28.9 PG (ref 26.8–34.3)
MCHC RBC AUTO-ENTMCNC: 31.9 G/DL (ref 31.4–37.4)
MCV RBC AUTO: 91 FL (ref 82–98)
PLATELET # BLD AUTO: 121 THOUSANDS/UL (ref 149–390)
PMV BLD AUTO: 11.8 FL (ref 8.9–12.7)
POTASSIUM SERPL-SCNC: 4.1 MMOL/L (ref 3.5–5.3)
PROTHROMBIN TIME: 20.5 SECONDS (ref 11.8–14.2)
RBC # BLD AUTO: 3.04 MILLION/UL (ref 3.81–5.12)
SODIUM SERPL-SCNC: 140 MMOL/L (ref 136–145)
UNIT DISPENSE STATUS: NORMAL
UNIT PRODUCT CODE: NORMAL
UNIT RH: NORMAL
WBC # BLD AUTO: 8.55 THOUSAND/UL (ref 4.31–10.16)

## 2018-05-24 PROCEDURE — 85610 PROTHROMBIN TIME: CPT | Performed by: PHYSICIAN ASSISTANT

## 2018-05-24 PROCEDURE — 85730 THROMBOPLASTIN TIME PARTIAL: CPT | Performed by: THORACIC SURGERY (CARDIOTHORACIC VASCULAR SURGERY)

## 2018-05-24 PROCEDURE — 80048 BASIC METABOLIC PNL TOTAL CA: CPT | Performed by: PHYSICIAN ASSISTANT

## 2018-05-24 PROCEDURE — 99231 SBSQ HOSP IP/OBS SF/LOW 25: CPT | Performed by: THORACIC SURGERY (CARDIOTHORACIC VASCULAR SURGERY)

## 2018-05-24 PROCEDURE — 85027 COMPLETE CBC AUTOMATED: CPT | Performed by: PHYSICIAN ASSISTANT

## 2018-05-24 PROCEDURE — 99232 SBSQ HOSP IP/OBS MODERATE 35: CPT | Performed by: INTERNAL MEDICINE

## 2018-05-24 PROCEDURE — 82948 REAGENT STRIP/BLOOD GLUCOSE: CPT

## 2018-05-24 RX ORDER — WARFARIN SODIUM 1 MG/1
2 TABLET ORAL
Status: DISCONTINUED | OUTPATIENT
Start: 2018-05-24 | End: 2018-05-24

## 2018-05-24 RX ORDER — OXYCODONE HYDROCHLORIDE 5 MG/1
5 TABLET ORAL ONCE
Status: COMPLETED | OUTPATIENT
Start: 2018-05-24 | End: 2018-05-24

## 2018-05-24 RX ORDER — TRAMADOL HYDROCHLORIDE 50 MG/1
50 TABLET ORAL ONCE
Status: COMPLETED | OUTPATIENT
Start: 2018-05-24 | End: 2018-05-24

## 2018-05-24 RX ORDER — WARFARIN SODIUM 4 MG/1
4 TABLET ORAL
Status: COMPLETED | OUTPATIENT
Start: 2018-05-24 | End: 2018-05-24

## 2018-05-24 RX ADMIN — DOCUSATE SODIUM 100 MG: 100 CAPSULE, LIQUID FILLED ORAL at 09:17

## 2018-05-24 RX ADMIN — CARVEDILOL 3.12 MG: 3.12 TABLET, FILM COATED ORAL at 17:13

## 2018-05-24 RX ADMIN — PANTOPRAZOLE SODIUM 40 MG: 40 TABLET, DELAYED RELEASE ORAL at 09:15

## 2018-05-24 RX ADMIN — TRAMADOL HYDROCHLORIDE 50 MG: 50 TABLET, FILM COATED ORAL at 13:02

## 2018-05-24 RX ADMIN — FUROSEMIDE 40 MG: 40 TABLET ORAL at 09:00

## 2018-05-24 RX ADMIN — PRAVASTATIN SODIUM 40 MG: 40 TABLET ORAL at 21:09

## 2018-05-24 RX ADMIN — ASPIRIN 81 MG 81 MG: 81 TABLET ORAL at 09:15

## 2018-05-24 RX ADMIN — CLOPIDOGREL BISULFATE 75 MG: 75 TABLET ORAL at 09:16

## 2018-05-24 RX ADMIN — TRAMADOL HYDROCHLORIDE 50 MG: 50 TABLET, FILM COATED ORAL at 03:38

## 2018-05-24 RX ADMIN — HEPARIN SODIUM 12 UNITS/KG/HR: 10000 INJECTION, SOLUTION INTRAVENOUS at 13:07

## 2018-05-24 RX ADMIN — WARFARIN SODIUM 4 MG: 4 TABLET ORAL at 17:13

## 2018-05-24 RX ADMIN — TRAMADOL HYDROCHLORIDE 50 MG: 50 TABLET, FILM COATED ORAL at 09:17

## 2018-05-24 RX ADMIN — TRAMADOL HYDROCHLORIDE 50 MG: 50 TABLET, FILM COATED ORAL at 19:50

## 2018-05-24 RX ADMIN — CARVEDILOL 3.12 MG: 3.12 TABLET, FILM COATED ORAL at 09:16

## 2018-05-24 RX ADMIN — ACETAMINOPHEN 650 MG: 325 TABLET, FILM COATED ORAL at 17:13

## 2018-05-24 RX ADMIN — DOCUSATE SODIUM 100 MG: 100 CAPSULE, LIQUID FILLED ORAL at 17:13

## 2018-05-24 RX ADMIN — OXYCODONE HYDROCHLORIDE 5 MG: 5 TABLET ORAL at 05:40

## 2018-05-24 RX ADMIN — POTASSIUM CHLORIDE 10 MEQ: 750 TABLET, EXTENDED RELEASE ORAL at 09:15

## 2018-05-24 NOTE — PROGRESS NOTES
Progress Note - Cardiothoracic Surgery   Nadine Fong 81 y o  female MRN: [id number]  Unit/Bed#: [facility] 429-01 Encounter: [id number]  Aortic stenosis, Non-Rheumatic  S/P transfemoral transcatheter aortic valve replacement; POD # 2    24 Hour Events: C/o pain overnight, given Oxy 5mg PO x1, has Tramadol 50mg PRN ordered & states sometimes takes 100mg at home  Otherwise no other events  No more complaints  Tolerating diet  (+) flatus/BM  Ambulating well  Pain controlled      Medications:   Scheduled Meds:  Current Facility-Administered Medications:  acetaminophen 650 mg Oral Q4H PRN Adline Golds, DO    aspirin 81 mg Oral Daily [facility]    bisacodyl 10 mg Rectal Daily PRN Adline Golds, DO    carvedilol 3 125 mg Oral BID With Meals [facility], DO    clopidogrel 75 mg Oral Daily [facility], PA-S[facility]    docusate sodium 100 mg Oral BID [facility], [facility]    furosemide 40 mg Oral Daily Corey Hughes PA-C    heparin (porcine) 3-20 Units/kg/hr (Order-Specific) [facility]venous [facility] Last Rate: 12 Units/kg/hr (05/23/18 1020)   insulin lispro 1-5 Units Subcutaneous TID AC Corey Hughes PA-C    insulin lispro 1-5 Units Subcutaneous HS [facility], [place]    ondansetron 4 mg Intravenous Q6H PRN Faye Jaimes, DO    pantoprazole 40 mg Oral Daily [facility], PA-[facility]    potassium chloride 10 mEq Oral Daily [facility], [facility]    pravastatin 40 mg Oral HS Jose R Jenny, DO    traMADol 50 mg Oral Q6H PRN Johanna Cotton PA-C      Continuous Infusions:  heparin (porcine) 3-20 Units/kg/hr (Order-Specific) Last Rate: 12 Units/kg/hr (05/23/18 1020)     PRN Meds:   acetaminophen    bisacodyl    ondansetron    traMADol    Vitals:   Vitals:    05/24/18 0002 05/24/18 0333 05/24/18 0700 05/24/18 0752   BP: 103/50 106/50  138/63   BP Location: Left arm Left arm  Left arm   Pulse: 69 79  85   Resp: 18 18  20   Temp: 98 2 °F (36 8 °C) 98 6 °F (37 °C)  98 °F (36 7 °C)   TempSrc: Oral Oral  Oral   SpO2: 98% 96%  97%   Weight:   55 kg (121 lb 4 1 oz)    Height:         Bp x24hrs: 100-130/50-60    Telemetry: Paced; Heart Rate: 78    Respiratory:   SpO2: SpO2: 97 %, SpO2 Activity: SpO2 Activity: At Rest, SpO2 Device: O2 Device: None (Room air); Room Air    Intake/Output:   I/O       05/22 0701 - 05/23 0700 05/23 0701 - 05/24 0700 05/24 0701 - 05/25 0700    P  O  240 620     I V  (mL/kg) 3034 2 (54 1) 175 (3 2)     IV Piggyback 200 200     Total Intake(mL/kg) 3474 2 (61 9) 995 (18 1)     Urine (mL/kg/hr) 1870 (1 4) 1595 (1 2)     Stool  0 (0)     Total Output 1870 1595      Net +1604 2 -600             Unmeasured Stool Occurrence  1 x         UOP - 350cc/8hr; 1595cc/24hrs    Stool - 1/24hrs    Weights:   Weight (last 2 days)     Date/Time   Weight    05/24/18 0700  55 (121 25)    05/23/18 0600  56 1 (123 68)            Admit weight: 53 5kg - up 1 5kg from admission weight    Results:     Results from last 7 days  Lab Units 05/24/18 0538 05/23/18 0320 05/22/18  1737  05/22/18  0955  05/21/18  0612   WBC Thousand/uL 8 55 12 35*  --   --   --   --  5 60   HEMOGLOBIN g/dL 8 8* 8 4* 9 1*  --  9 1*  --  10 7*   I STAT HEMOGLOBIN   --   --   --   < >  --   < >  --    HEMATOCRIT % 27 6* 26 9* 28 0*  --  27 9*  --  34 6*   PLATELETS Thousands/uL 121* 156  --   --  163  --  210   < > = values in this interval not displayed  Results from last 7 days  Lab Units 05/24/18  0538 05/23/18 0320 05/22/18  1750 05/22/18  0955   SODIUM mmol/L 140 143  --   --  138   POTASSIUM mmol/L 4 1 3 9 4 6  --  3 5   CHLORIDE mmol/L 105 109*  --   --  105   CO2 mmol/L 29 27  --   --  24   BUN mg/dL 20 18  --   --  21   CREATININE mg/dL 1 24 1 14  --   --  1 16   GLUCOSE RANDOM mg/dL 106 154*  --   --  191*   GLUCOSE, ISTAT   --   --   --   < >  --    CALCIUM mg/dL 8 7 8 4  --   --  8 3   < > = values in this interval not displayed      Results from last 7 days  Lab Units 05/24/18  0538 05/23/18  0534 05/23/18  0320 05/22/18 2126 05/22/18  0958   INR  1 75* 1 47*  --   --  1 64*   PTT seconds 84*  --  73* 60*  --      Coumadin mg  2         Point of care glucose: 29 - 740 - 962 - 114    Studies:  Echocardiogram: EF 45%, LVH at ULN, LA mildly dilated, mechanical MVR w/ normal function, TAVR sell seated w/ trave AI & no PVL (mean 5 5, MINE 1 5cm2), mod TR, mild to mod increase PA systolic pressure    Invasive Lines/Tubes:  Invasive Devices     Peripheral Intravenous Line            Peripheral IV 05/23/18 Right Hand less than 1 day                Physical Exam:    HEENT/NECK:  PERRLA  No jugular venous distention  Cardiac: Regular rate and rhythm  No rubs/murmurs/gallops  Pulmonary:  Breath sounds slightly diminished at the bases bilaterally  Abdomen:  Non-tender, Non-distended  Positive bowel sounds  Incisions: Inguinal puncture site is clean, dry, and intact  No hematoma  Lower extremities: Extremities warm/dry  Radial/PT/DP pulses 2+ bilaterally  Trace edema B/L  Neuro: Alert and oriented X 3  Sensation is grossly intact  No focal deficits  Skin: Warm/Dry, without rashes or lesions  Assessment:  Patient Active Problem List   Diagnosis    Nonrheumatic aortic valve stenosis    Coronary artery disease involving coronary bypass graft of native heart without angina pectoris    [facility]est[facility] failure) [facility] [address] [facility]    DM2 (diabetes mellitus, type 2) [facility] [address] [facility]    Essential hypertension    HLD (hyperlipidemia)    History of ischemic cardiomyopathy    H/O mitral valve replacement with mechanical valve    Aortic valve stenosis    Anemia    S/P TAVR (transcatheter aortic valve replacement)       Aortic stenosis, Non-Rheumatic  S/P transfemoral transcatheter aortic valve replacement; POD # 2    Plan:    1   Cardiac:   NSR; HR/BP well-controlled  Coreg, 3 125 mg PO BID  Continue ASA and Statin therapy  Patient wants PICC -- will discuss w/ attending  ASA/Plavix   INR 1 75, dose Coumadin 2mg tonight, continue heparin gtt  Follow up transthoracic echocardiogram today  Continue DVT prophylaxis    2  Pulmonary:   Good Room air oxygen saturation; Continue incentive spirometry/Coughing/Deep breathing exercises    3  Renal:   Intake/Output net: (-)600 mL/24 hours   Continue diuresis    Lasix 40mg IV QDay    Potassium Chloride 10mEq PO QDay  Post op Creatinine stable; Follow up labs prn    4  Neuro:  Neurologically intact; No active issues  Incisional pain well-controlled; Continue prn Tylenol & Tramadol (home med)    5  GI:  Tolerating TLC 2 3 gm sodium diet  Maintain 1800 mL daily fluid restriction   Continue stool softeners and prn suppository  Continue GI prophylaxis    6  Endo:    No history of diabetes; Glucose well-controlled with insulin sliding scale coverage    7   Disposition:  Ambulating independently, Anticipate discharge to home once INR therapeutic     VTE Pharmacologic Prophylaxis: Heparin and Warfarin (Coumadin)  VTE Mechanical Prophylaxis: sequential compression device    Collaborative rounds completed with KYE Crockett , and Eve Serna RN    SIGNATURE: Rossi Hui PA-C  DATE: May 24, 2018  TIME: 8:15 AM

## 2018-05-24 NOTE — PROGRESS NOTES
Cardiology Progress Note - Kathy Smith 81 y o  female MRN: [id number]    Unit/Bed#: [facility] 429-01 Encounter: [id number]      Assessment:  1  Severe aortic stenosis - POD #2 s/p transfemoral TAVR  2  Mitral valve disease s/p mechanical MVR  3  CAD s/p remote CABG with recent PCI to SVG to LAD  4  Ischemic cardiomyopathy - EF 45%  5  Chronic combined CHF  6  Sick sinus syndrome s/p PPM  7  LBBB  8   Bilateral carotid artery stenosis  9  Dyslipidemia     Plan:  1  INR noted - continue IV heparin and warfarin - goal INR 2 5 to 3 5  2  Blood pressure controlled  3  Volume status status stable on current diuretic regimen  4  On appropriate medication regimen for #4 including Entresto (on hold)/carvedilol  5  DAPT given recent PCI    Subjective:   Patient seen and examined  No significant events overnight  Objective:     Vitals: Blood pressure 138/63, pulse 85, temperature 98 °F (36 7 °C), temperature source Oral, resp  rate 20, height 4' 11" (1 499 m), weight 55 kg (121 lb 4 1 oz), SpO2 97 %  , Body mass index is 24 49 kg/m² ,   Orthostatic Blood Pressures      Most Recent Value   Blood Pressure  138/63 filed at 05/24/2018 9528   Patient Position - Orthostatic VS  Sitting filed at 05/24/2018 7942            Intake/Output Summary (Last 24 hours) at 05/24/18 8631  Last data filed at 05/24/18 0600   Gross per 24 hour   Intake              862 ml   Output             1520 ml   Net             -658 ml           Physical Exam:    GEN: Kathy Smith appears well, alert and oriented x 3, pleasant and cooperative   HEENT: pupils equal, round, and reactive to light; extraocular muscles intact  NECK: supple, no carotid bruits   HEART: regular rhythm, crisp prosthetic heart tones, no murmur   LUNGS: clear to auscultation bilaterally; no wheezes, rales, or rhonchi   ABDOMEN: normal bowel sounds, soft, no tenderness, no distention  EXTREMITIES: peripheral pulses normal; no clubbing, cyanosis, or edema  NEURO: no focal findings   SKIN: normal without suspicious lesions on exposed skin    Medications:      Current Facility-Administered Medications:     acetaminophen (TYLENOL) tablet 650 mg, 650 mg, Oral, Q4H PRN, Dorma Elver, DO, 650 mg at 05/23/18 2027    aspirin chewable tablet 81 mg, 81 mg, Oral, Daily, Dorma Elver, DO, 81 mg at 05/23/18 5997    bisacodyl (DULCOLAX) rectal suppository 10 mg, 10 mg, Rectal, Daily PRN, Dorma Elver, DO    carvedilol (COREG) tablet 3 125 mg, 3 125 mg, Oral, BID With Meals, Parrish Raya, DO, 3 125 mg at 05/23/18 1736    clopidogrel (PLAVIX) tablet 75 mg, 75 mg, Oral, Daily, Gema Dumont PA-C, 75 mg at 05/23/18 0820    docusate sodium (COLACE) capsule 100 mg, 100 mg, Oral, BID, CHAU Marlow-SANTIAGO, 100 mg at 05/23/18 1736    furosemide (LASIX) tablet 40 mg, 40 mg, Oral, Daily, ABHILASH MarlowC, 40 mg at 05/23/18 0820    heparin (porcine) 25,000 units in 250 mL infusion (premix), 3-20 Units/kg/hr (Order-Specific), Intravenous, Titrated, Jess Rojas PA-C, Last Rate: 6 mL/hr at 05/23/18 1020, 12 Units/kg/hr at 05/23/18 1020    insulin lispro (HumaLOG) 100 units/mL subcutaneous injection 1-5 Units, 1-5 Units, Subcutaneous, TID AC **AND** Fingerstick Glucose (POCT), , , TID AC, Gema Dumont PA-C    insulin lispro (HumaLOG) 100 units/mL subcutaneous injection 1-5 Units, 1-5 Units, Subcutaneous, HS, Gema Dumont PA-C, 1 Units at 05/23/18 2202    ondansetron (ZOFRAN) injection 4 mg, 4 mg, Intravenous, Q6H PRN, Dorma Elver, DO    pantoprazole (PROTONIX) EC tablet 40 mg, 40 mg, Oral, Daily, ABHILASH MarlowC    potassium chloride (K-DUR,KLOR-CON) CR tablet 10 mEq, 10 mEq, Oral, Daily, Gema Dumont PA-C    pravastatin (PRAVACHOL) tablet 40 mg, 40 mg, Oral, HS, Jose R Sutton DO, 40 mg at 05/23/18 2200    traMADol (ULTRAM) tablet 50 mg, 50 mg, Oral, Q6H PRN, Guillermina Ngo PA-C, 50 mg at 05/24/18 0338    traMADol (ULTRAM) tablet 50 mg, 50 mg, Oral, Once, Melissa Riedel, PA-C    warfarin (COUMADIN) tablet 2 mg, 2 mg, Oral, Once (warfarin), Melissa Riedel, PA-C     Labs & Results:          Results from last 7 days  Lab Units 05/24/18 0538 05/23/18 0320 05/22/18  1737  05/22/18  0955  05/21/18  0612   WBC Thousand/uL 8 55 12 35*  --   --   --   --  5 60   HEMOGLOBIN g/dL 8 8* 8 4* 9 1*  --  9 1*  --  10 7*   I STAT HEMOGLOBIN   --   --   --   < >  --   < >  --    HEMATOCRIT % 27 6* 26 9* 28 0*  --  27 9*  --  34 6*   PLATELETS Thousands/uL 121* 156  --   --  163  --  210   < > = values in this interval not displayed  Results from last 7 days  Lab Units 05/24/18 0538 05/23/18 0320 05/22/18  1750 05/22/18  1020 05/22/18  0955   SODIUM mmol/L 140 143  --   --  138   POTASSIUM mmol/L 4 1 3 9 4 6  --  3 5   CHLORIDE mmol/L 105 109*  --   --  105   CO2 mmol/L 29 27  --   --  24   BUN mg/dL 20 18  --   --  21   CREATININE mg/dL 1 24 1 14  --   --  1 16   CALCIUM mg/dL 8 7 8 4  --   --  8 3   GLUCOSE RANDOM mg/dL 106 154*  --   --  191*   GLUCOSE, ISTAT mg/dl  --   --   --  170*  --        Results from last 7 days  Lab Units 05/24/18  0538 05/23/18  0534 05/23/18  0320 05/22/18  2126 05/22/18  0958   INR  1 75* 1 47*  --   --  1 64*   PTT seconds 84*  --  73* 60*  --        Results from last 7 days  Lab Units 05/23/18 0320   MAGNESIUM mg/dL 2 1       Counseling / Coordination of Care  Total floor / unit time spent today 20 minutes  Greater than 50% of total time was spent with the patient and / or family counseling and / or coordination of care

## 2018-05-24 NOTE — SOCIAL WORK
MCG Guide Used for Initial Round:  Aortic Valve Replacement, Transcatheter RRG  Optimal GLOS: 3  Hospital Day: 5  DC Readiness: Discharge readiness is indicated by patient meeting Recovery Milestones, including ALL of the following:   Hemodynamic stability   Mechanical ventilation absent   Chest pain, dyspnea, or anginal equivalent absent   Dangerous arrhythmia absent   New neurologic deficit absent   Renal function at baseline or acceptable for next level of care   Oxygenation at baseline   Vascular access site without evidence of infection, aneurysm, or growing hematoma   Pacer wires absent   Access sheaths absent   Ambulatory or at baseline   Oral hydration, medications, and diet   Discharge plans and education understood    Identified Barriers: Pain, INR not therapeutic, Heparin bridge to coumadin  Discussion Date (Time): 05/24/18 with Catrina Ponce CT surgery

## 2018-05-24 NOTE — DISCHARGE INSTRUCTIONS
Transcatheter Aortic Valve Replacement   WHAT YOU NEED TO KNOW:   · A TAVR is a procedure to replace your aortic valve  It is done without removing your old valve  The aortic valve is between the left ventricle and the aorta  The left ventricle is the lower left chamber of your heart  The aorta is a blood vessel that pumps blood to your brain and body  The aortic valve opens and closes to let blood flow from your heart to the rest of your body  · TAVR may be used to replace your aortic valve if open heart surgery is not safe for you  Your valve will be replaced with a tissue valve  The tissue may be taken from an animal, such as a pig or cow  DISCHARGE INSTRUCTIONS:   Call 911 for any of the following:   · You have any of the following signs of a heart attack:      ¨ Squeezing, pressure, or pain in your chest that lasts longer than 5 minutes or returns    ¨ Discomfort or pain in your back, neck, jaw, stomach, or arm     ¨ Trouble breathing    ¨ Nausea or vomiting    ¨ Lightheadedness or a sudden cold sweat, especially with chest pain or trouble breathing    · You have any of the following signs of a stroke:      ¨ Numbness or drooping on one side of your face     ¨ Weakness in an arm or leg    ¨ Confusion or difficulty speaking    ¨ Dizziness, a severe headache, or vision loss    · You feel lightheaded, short of breath, and have chest pain  · You cough up blood  · You have trouble breathing  Seek care immediately if:   · Blood soaks through your bandage  · Your stitches come apart  · Your wound gets swollen quickly  · Your heart is beating faster or slower than usual      · Your arm or leg feels numb, cool, or looks pale  · Your arm or leg feels warm, tender, and painful  It may look swollen and red  · You feel weak or dizzy  Contact your healthcare provider if:   · You have a fever or chills  · Your wound is red, swollen, or draining pus      · Your wound looks more bruised or there is new bruising near your wound  · You have nausea or are vomiting  · Your skin is itchy, swollen, or you have a rash  · You have questions or concerns about your condition or care  Medicines: You may need any of the following:  · Blood thinners    help prevent blood clots  Examples of blood thinners include heparin and warfarin  Clots can cause strokes, heart attacks, and death  The following are general safety guidelines to follow while you are taking a blood thinner:    ¨ Watch for bleeding and bruising while you take blood thinners  Watch for bleeding from your gums or nose  Watch for blood in your urine and bowel movements  Use a soft washcloth on your skin, and a soft toothbrush to brush your teeth  This can keep your skin and gums from bleeding  If you shave, use an electric shaver  Do not play contact sports  ¨ Tell your dentist and other healthcare providers that you take anticoagulants  Wear a bracelet or necklace that says you take this medicine  ¨ Do not start or stop any medicines unless your healthcare provider tells you to  Many medicines cannot be used with blood thinners  ¨ Tell your healthcare provider right away if you forget to take the medicine, or if you take too much  ¨ Warfarin  is a blood thinner that you may need to take  The following are things you should be aware of if you take warfarin  § Foods and medicines can affect the amount of warfarin in your blood  Do not make major changes to your diet while you take warfarin  Warfarin works best when you eat about the same amount of vitamin K every day  Vitamin K is found in green leafy vegetables and certain other foods  Ask for more information about what to eat when you are taking warfarin  § You will need to see your healthcare provider for follow-up visits when you are on warfarin  You will need regular blood tests  These tests are used to decide how much medicine you need      · Antiplatelets , such as aspirin, help prevent blood clots  Take your antiplatelet medicine exactly as directed  These medicines make it more likely for you to bleed or bruise  If you are told to take aspirin, do not take acetaminophen or ibuprofen instead  · Acetaminophen  helps decrease your pain  This medicine is available without a doctor's order  Ask how much medicine is safe to take, and how often to take it  Acetaminophen can cause liver damage if not taken correctly  · Take your medicine as directed  Contact your healthcare provider if you think your medicine is not helping or if you have side effects  Tell him or her if you are allergic to any medicine  Keep a list of the medicines, vitamins, and herbs you take  Include the amounts, and when and why you take them  Bring the list or the pill bottles to follow-up visits  Carry your medicine list with you in case of an emergency  Care for your wound as directed:  Ask your healthcare provider when your wound can get wet  Carefully wash around the wound with soap and water  Do not scrub your wound  You can let soap and water gently run over your wound  Gently pat dry the area and put on new, clean bandages as directed  Check your wound every day for signs of infection such as swelling, pus, or redness  Change your bandages when they get wet or dirty  Do not put lotions or powders on your wound  Do not take a bath or swim until your healthcare provider says it is okay  These activities can increase your risk for a wound infection  Activity:  Do not lift anything heavier than 5 pounds or do vigorous activities such as sports  These actions will put too much stress on your wound and heart  Take short walks around the house several times a day  This will help prevent blood clots  Ask your healthcare provider what other activities are safe for you to do  Also ask when you can return to your normal activities and work or school  Self-care:   · Eat heart healthy foods    You may need to eat foods that are low in salt, fat, or cholesterol  Healthy foods include fruits, vegetables, whole-grain breads, low-fat dairy products, beans, lean meats, and fish  Ask your healthcare provider for more information about a heart healthy diet  · Do not smoke  Nicotine and other chemicals in cigarettes and cigars can cause heart and lung damage  Nicotine can also slow healing  Ask your healthcare provider for information if you currently smoke and need help to quit  E-cigarettes or smokeless tobacco still contain nicotine  Talk to your healthcare provider before you use these products  · Do not drink alcohol  Ask your cardiologist if it is safe for you to drink alcohol  Alcohol can increase your risk for high blood pressure, diabetes, and coronary artery disease  · Maintain a healthy weight  Ask your healthcare provider how much you should weigh  Extra weight can increase the stress on your heart  Ask him to help you create a weight loss plan if you are overweight  · Exercise as directed after you recover  Your healthcare provider can help you create an exercise plan that is right for you  Exercise will help keep your heart healthy  Ask your healthcare provider if you need antibiotics before procedures:  Some procedures may allow bacteria to get into your blood and travel to your heart  This can cause an infection in your heart and prevent you from healing  You may need antibiotics before certain procedures that happen in the next 6 months to prevent infection  This may also include certain dental procedures  Ask your healthcare provider for more information  Follow up with your healthcare provider as directed: You may need to return for tests  These tests will make sure your valve is working correctly  Write down your questions so you remember to ask them during your visits    © 2017 [employer] Information is for End User's use only and may not be sold, redistributed or otherwise used for commercial purposes  All illustrations and images included in CareNotes® are the copyrighted property of [employer]  or Alvin Perez  The above information is an  only  It is not intended as medical advice for individual conditions or treatments  Talk to your doctor, nurse or pharmacist before following any medical regimen to see if it is safe and effective for you  Heart Healthy Diet   WHAT YOU NEED TO KNOW:   What is a heart healthy diet? A heart healthy diet is an eating plan low in total fat, unhealthy fats, and sodium (salt)  A heart healthy diet helps decrease your risk for heart disease and stroke  Limit the amount of fat you eat to 25% to 35% of your total daily calories  Limit sodium to less than 2,300 mg each day  What is healthy fat, and where is it found? Healthy fats can help improve cholesterol levels  The risk for heart disease is decreased when cholesterol levels are normal  Choose healthy fats, such as the following:  · Unsaturated fat  is found in foods such as soybean, canola, olive, corn, and safflower oils  It is also found in soft tub margarine that is made with liquid vegetable oil  · Omega-3 fat  is found in certain fish, such as salmon, tuna, and trout, and in walnuts and flaxseed  What is unhealthy fat, and where is it found? Unhealthy fats can cause unhealthy cholesterol levels in your blood and increase your risk of heart disease  Limit unhealthy fats, such as the following:  · Cholesterol  is found in animal foods, such as eggs and lobster, and in dairy products made from whole milk  Limit cholesterol to less than 300 milligrams (mg) each day  You may need to limit cholesterol to 200 mg each day if you have heart disease  · Saturated fat  is found in meats, such as cuellar and hamburger  It is also found in chicken or turkey skin, whole milk, and butter  Limit saturated fat to less than 7% of your total daily calories  Limit saturated fat to less than 6% if you have heart disease or are at increased risk for it  · Trans fat  is found in packaged foods, such as potato chips and cookies  It is also in hard margarine, some fried foods, and shortening  Avoid trans fats as much as possible  What can I eat and drink on a heart healthy diet? Ask your dietitian or healthcare provider how many servings to have from each of the following food groups:  · Grains:      ¨ Whole-wheat breads, cereals, and pastas, and brown rice    ¨ Low-fat, low-sodium crackers and chips    · Vegetables:      ¨ Broccoli, green beans, green peas, and spinach    ¨ Collards, kale, and lima beans    ¨ Carrots, sweet potatoes, tomatoes, and peppers    ¨ Canned vegetables with no salt added    · Fruits:      ¨ Bananas, peaches, pears, and pineapple    ¨ Grapes, raisins, and dates    ¨ Oranges, tangerines, grapefruit, orange juice, and grapefruit juice    ¨ Apricots, mangoes, melons, and papaya    ¨ Raspberries and strawberries    ¨ Canned fruit with no added sugar    · Low-fat dairy products:      ¨ Nonfat (skim) milk, 1% milk, and low-fat almond, cashew, or soy milks fortified with calcium    ¨ Low-fat cheese, regular or frozen yogurt, and cottage cheese    · Meats and proteins , such as lean cuts of beef and pork (loin, leg, round), skinless chicken and turkey, legumes, soy products, egg whites, and nuts  Which foods and drinks do I need to limit or avoid?   Ask your dietitian or healthcare provider about these and other foods that are high in unhealthy fat, sodium, and sugar:  · Snack or packaged foods , such as frozen dinners, cookies, macaroni and cheese, and cereals with more than 300 mg of sodium per serving    · Canned or dry mixes  for cakes, soups, sauces, or gravies    · Vegetables with added sodium , such as instant potatoes, vegetables with added sauces, or regular canned vegetables    · Other foods high in sodium , such as ketchup, barbecue sauce, salad dressing, pickles, olives, soy sauce, and miso    · High-fat dairy foods  such as whole or 2% milk, cream cheese, or sour cream, and cheeses     · High-fat protein foods  such as high-fat cuts of beef (T-bone steaks, ribs), chicken or turkey with skin, and organ meats, such as liver    · Cured or smoked meats , such as hot dogs, cuellar, and sausage    · Unhealthy fats and oils , such as butter, stick margarine, shortening, and cooking oils such as coconut or palm oil    · Food and drinks high in sugar , such as soft drinks (soda), sports drinks, sweetened tea, candy, cake, cookies, pies, and doughnuts  What other diet guidelines should I follow? · Eat more foods containing omega-3 fats  Eat fish high in omega-3 fats at least 2 times a week  · Limit alcohol  Too much alcohol can damage your heart and raise your blood pressure  Women should limit alcohol to 1 drink a day  Men should limit alcohol to 2 drinks a day  A drink of alcohol is 12 ounces of beer, 5 ounces of wine, or 1½ ounces of liquor  · Choose low-sodium foods  High-sodium foods can lead to high blood pressure  Add little or no salt to food you prepare  Use herbs and spices in place of salt  · Eat more fiber  to help lower cholesterol levels  Eat at least 5 servings of fruits and vegetables each day  Eat 3 ounces of whole-grain foods each day  Legumes (beans) are also a good source of fiber  What lifestyle guidelines should I follow? · Do not smoke  Nicotine and other chemicals in cigarettes and cigars can cause lung and heart damage  Ask your healthcare provider for information if you currently smoke and need help to quit  E-cigarettes or smokeless tobacco still contain nicotine  Talk to your healthcare provider before you use these products  · Exercise regularly  to help you maintain a healthy weight and improve your blood pressure and cholesterol levels  Ask your healthcare provider about the best exercise plan for you   Do not start an exercise program without asking your healthcare provider  CARE AGREEMENT:   You have the right to help plan your care  Discuss treatment options with your caregivers to decide what care you want to receive  You always have the right to refuse treatment  The above information is an  only  It is not intended as medical advice for individual conditions or treatments  Talk to your doctor, nurse or pharmacist before following any medical regimen to see if it is safe and effective for you  © 2017 [facility] Information is for End User's use only and may not be sold, redistributed or otherwise used for commercial purposes  All illustrations and images included in CareNotes® are the copyrighted property of [employer]  or Alvin Perez

## 2018-05-24 NOTE — RESTORATIVE TECHNICIAN NOTE
Restorative Specialist Mobility Note       Activity: Ambulate in cerna, Chair     Assistive Device: Front wheel walker

## 2018-05-25 LAB
APTT PPP: 80 SECONDS (ref 24–36)
GLUCOSE SERPL-MCNC: 127 MG/DL (ref 65–140)
GLUCOSE SERPL-MCNC: 143 MG/DL (ref 65–140)
GLUCOSE SERPL-MCNC: 150 MG/DL (ref 65–140)
GLUCOSE SERPL-MCNC: 151 MG/DL (ref 65–140)
INR PPP: 2.28 (ref 0.86–1.17)
PROTHROMBIN TIME: 25.2 SECONDS (ref 11.8–14.2)

## 2018-05-25 PROCEDURE — 85730 THROMBOPLASTIN TIME PARTIAL: CPT | Performed by: THORACIC SURGERY (CARDIOTHORACIC VASCULAR SURGERY)

## 2018-05-25 PROCEDURE — 82948 REAGENT STRIP/BLOOD GLUCOSE: CPT

## 2018-05-25 PROCEDURE — 99232 SBSQ HOSP IP/OBS MODERATE 35: CPT | Performed by: THORACIC SURGERY (CARDIOTHORACIC VASCULAR SURGERY)

## 2018-05-25 PROCEDURE — 99231 SBSQ HOSP IP/OBS SF/LOW 25: CPT | Performed by: INTERNAL MEDICINE

## 2018-05-25 PROCEDURE — 85610 PROTHROMBIN TIME: CPT | Performed by: PHYSICIAN ASSISTANT

## 2018-05-25 RX ORDER — WARFARIN SODIUM 1 MG/1
2 TABLET ORAL
Status: COMPLETED | OUTPATIENT
Start: 2018-05-25 | End: 2018-05-25

## 2018-05-25 RX ADMIN — CLOPIDOGREL BISULFATE 75 MG: 75 TABLET ORAL at 08:47

## 2018-05-25 RX ADMIN — TRAMADOL HYDROCHLORIDE 50 MG: 50 TABLET, FILM COATED ORAL at 02:01

## 2018-05-25 RX ADMIN — FUROSEMIDE 40 MG: 40 TABLET ORAL at 08:47

## 2018-05-25 RX ADMIN — TRAMADOL HYDROCHLORIDE 50 MG: 50 TABLET, FILM COATED ORAL at 17:44

## 2018-05-25 RX ADMIN — ASPIRIN 81 MG 81 MG: 81 TABLET ORAL at 08:47

## 2018-05-25 RX ADMIN — PRAVASTATIN SODIUM 40 MG: 40 TABLET ORAL at 21:45

## 2018-05-25 RX ADMIN — PANTOPRAZOLE SODIUM 40 MG: 40 TABLET, DELAYED RELEASE ORAL at 08:47

## 2018-05-25 RX ADMIN — ACETAMINOPHEN 650 MG: 325 TABLET, FILM COATED ORAL at 13:05

## 2018-05-25 RX ADMIN — ACETAMINOPHEN 650 MG: 325 TABLET, FILM COATED ORAL at 06:15

## 2018-05-25 RX ADMIN — DOCUSATE SODIUM 100 MG: 100 CAPSULE, LIQUID FILLED ORAL at 17:03

## 2018-05-25 RX ADMIN — INSULIN LISPRO 1 UNITS: 100 INJECTION, SOLUTION INTRAVENOUS; SUBCUTANEOUS at 17:00

## 2018-05-25 RX ADMIN — POTASSIUM CHLORIDE 10 MEQ: 750 TABLET, EXTENDED RELEASE ORAL at 08:46

## 2018-05-25 RX ADMIN — INSULIN LISPRO 1 UNITS: 100 INJECTION, SOLUTION INTRAVENOUS; SUBCUTANEOUS at 12:58

## 2018-05-25 RX ADMIN — WARFARIN SODIUM 2 MG: 1 TABLET ORAL at 17:03

## 2018-05-25 RX ADMIN — TRAMADOL HYDROCHLORIDE 50 MG: 50 TABLET, FILM COATED ORAL at 08:47

## 2018-05-25 RX ADMIN — DOCUSATE SODIUM 100 MG: 100 CAPSULE, LIQUID FILLED ORAL at 08:47

## 2018-05-25 RX ADMIN — CARVEDILOL 3.12 MG: 3.12 TABLET, FILM COATED ORAL at 08:46

## 2018-05-25 RX ADMIN — CARVEDILOL 3.12 MG: 3.12 TABLET, FILM COATED ORAL at 17:03

## 2018-05-25 NOTE — PROGRESS NOTES
Progress Note - Cardiothoracic Surgery   Corey Alva 81 y o  female MRN: [id number]  Unit/Bed#: [facility] 429-01 Encounter: [id number]  Aortic stenosis, Non-Rheumatic  S/P transfemoral transcatheter aortic valve replacement; POD # 3      24 Hour Events: No events  No complaints  Tolerating diet  (+) flatus, (-) BM  Ambulating well  Pain control improved      Medications:   Scheduled Meds:  Current Facility-Administered Medications:  acetaminophen 650 mg Oral Q4H PRN Willem Barragan, DO    aspirin 81 mg Oral Daily Jose R [place], DO    bisacodyl 10 mg Rectal Daily PRN Willem Barragan, DO    carvedilol 3 125 mg Oral BID With Meals Willem Barragan, DO    clopidogrel 75 mg Oral Daily Rebbeca Parth, PA-SANTIAGO    docusate sodium 100 mg Oral BID Rebbeca Parth, PA-C    furosemide 40 mg Oral Daily Rebbeca Parth, PA-C    heparin (porcine) 3-20 Units/kg/hr (Order-Specific) Intravenous Titrated Anurag Daniel PA-C Last Rate: 12 Units/kg/hr (05/24/18 1307)   insulin lispro 1-5 Units Subcutaneous TID AC Corey Hughes PA-C    insulin lispro 1-5 Units Subcutaneous HS Rebbeca Parth, PA-SANTIAGO    ondansetron 4 mg Intravenous Q6H PRN Willem Barragan, DO    pantoprazole 40 mg Oral Daily Rebbeca Parth, PA-C    potassium chloride 10 mEq Oral Daily Rebbeca Parth, PA-C    pravastatin 40 mg Oral HS Jose R Sutton, DO    traMADol 50 mg Oral Q6H PRN Betsey Fuller PA-C      Continuous Infusions:  heparin (porcine) 3-20 Units/kg/hr (Order-Specific) Last Rate: 12 Units/kg/hr (05/24/18 1307)     PRN Meds:   acetaminophen    bisacodyl    ondansetron    traMADol    Vitals:   Vitals:    05/25/18 0001 05/25/18 0236 05/25/18 0600 05/25/18 0748   BP: (!) 103/49 128/58  111/54   BP Location: Left arm Left arm  Left arm   Pulse: 70 81  81   Resp: 18 18 18   Temp: 98 2 °F (36 8 °C) 98 3 °F (36 8 °C)  99 2 °F (37 3 °C)   TempSrc: Oral Oral  Oral   SpO2: 94% 98%  97%   Weight:   55 3 kg (121 lb 14 6 oz)    Height:         Bp x24hrs: /40-60    Telemetry: Paced; Heart Rate: 75    Respiratory:   SpO2: SpO2: 97 %, SpO2 Activity: SpO2 Activity: At Rest, SpO2 Device: O2 Device: None (Room air); Room Air    Intake/Output:   I/O       05/23 0701 - 05/24 0700 05/24 0701 - 05/25 0700 05/25 0701 - 05/26 0700    P  O  620 700 180    I V  (mL/kg) 175 (3 2)      IV Piggyback 200      Total Intake(mL/kg) 995 (18 1) 700 (12 7) 180 (3 3)    Urine (mL/kg/hr) 1595 (1 2) 1300 (1)     Stool 0 (0) 0 (0)     Total Output 1595 1300      Net -600 -600 +180           Unmeasured Stool Occurrence 1 x 0 x         UOP - 400cc/8hr; 1300cc/24hrs    Weights:   Weight (last 2 days)     Date/Time   Weight    05/25/18 0600  55 3 (121 91)    05/24/18 0700  55 (121 25)    05/23/18 0600  56 1 (123 68)            Admit weight: 53 5kg - up 2kg from admission weight    Results:   NO NEW CBC OR BMP TODAY    Results from last 7 days  Lab Units 05/24/18 0538 05/23/18  0320 05/22/18  1737  05/22/18  0955  05/21/18  0612   WBC Thousand/uL 8 55 12 35*  --   --   --   --  5 60   HEMOGLOBIN g/dL 8 8* 8 4* 9 1*  --  9 1*  --  10 7*   I STAT HEMOGLOBIN   --   --   --   < >  --   < >  --    HEMATOCRIT % 27 6* 26 9* 28 0*  --  27 9*  --  34 6*   PLATELETS Thousands/uL 121* 156  --   --  163  --  210   < > = values in this interval not displayed  Results from last 7 days  Lab Units 05/24/18  0538 05/23/18  0320 05/22/18  1750 05/22/18  0955   SODIUM mmol/L 140 143  --   --  138   POTASSIUM mmol/L 4 1 3 9 4 6  --  3 5   CHLORIDE mmol/L 105 109*  --   --  105   CO2 mmol/L 29 27  --   --  24   BUN mg/dL 20 18  --   --  21   CREATININE mg/dL 1 24 1 14  --   --  1 16   GLUCOSE RANDOM mg/dL 106 154*  --   --  191*   GLUCOSE, ISTAT   --   --   --   < >  --    CALCIUM mg/dL 8 7 8 4  --   --  8 3   < > = values in this interval not displayed      Results from last 7 days  Lab Units 05/25/18  0452 05/24/18  0538 05/23/18  0534 05/23/18  0320   INR  2 28* 1 75* 1 47*  --    PTT seconds 80* 84*  -- 73*     Coumadin mg  4 2  5/22 - 2      Point of care glucose: 143 - 142 - 99 - 102    Studies:  No new studies    Invasive Lines/Tubes:  Invasive Devices     Peripheral Intravenous Line            Peripheral IV 05/23/18 Right Hand 1 day                Physical Exam:    HEENT/NECK:  PERRLA  No jugular venous distention  Cardiac: Regular rate and rhythm  No rubs/murmurs/gallops  Pulmonary:  Breath sounds slightly diminished at the bases bilaterally  Abdomen:  Non-tender, Non-distended  Positive bowel sounds  Incisions: Groin incision is clean, dry, and intact  No hematoma  Lower extremities: Extremities warm/dry  Radial/PT/DP pulses 2+ bilaterally  Trace edema B/L  Neuro: Alert and oriented X 3  Sensation is grossly intact  No focal deficits  Skin: Warm/Dry, without rashes or lesions  Assessment:  Patient Active Problem List   Diagnosis    Nonrheumatic aortic valve stenosis    Coronary artery disease involving coronary bypass graft of native heart without angina pectoris    [facility]estive [facility] failure) [facility] [address] [facility]    DM2 (diabetes mellitus, type 2) ([facility]    Essential hypertension    HLD (hyperlipidemia)    History of ischemic cardiomyopathy    H/O mitral valve replacement with mechanical valve    Aortic valve stenosis    Anemia    S/P TAVR (transcatheter aortic valve replacement)    Thrombocytopenia (HCC)       Aortic stenosis, Non-Rheumatic  S/P transfemoral transcatheter aortic valve replacement; POD # 3    Plan:    1  Cardiac:   NSR; HR/BP well-controlled  Coreg 3 125 mg PO BID   Continue Plavix  Continue ASA and Statin therapy  INR 2 25, dose Coumadin 2mg tonight, discuss w/ attending SQ Lovenox to bridge til INR therapeutic  Maintain central IV access today for heparin gtt  Continue DVT prophylaxis    2  Pulmonary:   Good Room air oxygen saturation; Continue incentive spirometry/Coughing/Deep breathing exercises    3   Renal:   Intake/Output net: (-)600 mL/24 hours  Continue diuresis   Lasix 40 mg IV QD  Potassium Chloride 20 mEq PO QD    4  Neuro:  Neurologically intact; No active issues  Incisional pain well-controlled; Continue prn Percocet    5  GI:  Tolerating TLC 2 3 gm sodium diet  Maintain 1800 mL daily fluid restriction   Continue stool softeners and prn suppository  Continue GI prophylaxis    6  Endo:    History of DM2, control w/ diet, continue SSIC during hospitalization    7   Disposition:  Ambulating independently, Anticipate discharge to home once approved by attending     VTE Pharmacologic Prophylaxis: Heparin and Warfarin (Coumadin)  VTE Mechanical Prophylaxis: sequential compression device    Collaborative rounds completed with KYE Casiano , and lianna RN    SIGNATURE: Kj Bills PA-C  DATE: May 25, 2018  TIME: 8:16 AM

## 2018-05-25 NOTE — PROGRESS NOTES
Cardiology Progress Note - Andrzej Dos Santos 81 y o  female MRN: [id number]    Unit/Bed#: [facility] 429-01 Encounter: [id number]      Assessment:  1  Severe aortic stenosis - POD #2 s/p transfemoral TAVR  2  Mitral valve disease s/p mechanical MVR  3  CAD s/p remote CABG with recent PCI to SVG to LAD  4  Ischemic cardiomyopathy - EF 45%  5  Chronic combined CHF  6  Sick sinus syndrome s/p PPM  7  LBBB  8   Bilateral carotid artery stenosis  9  Dyslipidemia     Plan:  1  INR noted - continue IV heparin and warfarin - goal INR 2 5 to 3 5  2  Blood pressure controlled  3  Volume status status stable on current diuretic regimen  4  On appropriate medication regimen for #4 including Entresto - resume/carvedilol  5  DAPT given recent PCI    Subjective:   Patient seen and examined  No significant events overnight  Objective:     Vitals: Blood pressure 111/54, pulse 81, temperature 99 2 °F (37 3 °C), temperature source Oral, resp  rate 18, height 4' 11" (1 499 m), weight 55 3 kg (121 lb 14 6 oz), SpO2 97 %  , Body mass index is 24 62 kg/m² ,   Orthostatic Blood Pressures      Most Recent Value   Blood Pressure  111/54 [Map 78] filed at 05/25/2018 4204   Patient Position - Orthostatic VS  Sitting filed at 05/25/2018 0748            Intake/Output Summary (Last 24 hours) at 05/25/18 8996  Last data filed at 05/25/18 0919   Gross per 24 hour   Intake              880 ml   Output             1600 ml   Net             -720 ml           Physical Exam:    GEN: Andrzej Dos Santos appears well, alert and oriented x 3, pleasant and cooperative   HEENT: pupils equal, round, and reactive to light; extraocular muscles intact  NECK: supple, no carotid bruits   HEART: regular rhythm, crisp prosthetic heart tones, no murmur   LUNGS: clear to auscultation bilaterally; no wheezes, rales, or rhonchi   ABDOMEN: normal bowel sounds, soft, no tenderness, no distention  EXTREMITIES: peripheral pulses normal; no clubbing, cyanosis, or edema  NEURO: no focal findings   SKIN: normal without suspicious lesions on exposed skin    Medications:      Current Facility-Administered Medications:     acetaminophen (TYLENOL) tablet 650 mg, 650 mg, Oral, Q4H PRN, Gary Keto, DO, 650 mg at 05/25/18 0615    aspirin chewable tablet 81 mg, 81 mg, Oral, Daily, Gary Keto, DO, 81 mg at 05/25/18 0847    bisacodyl (DULCOLAX) rectal suppository 10 mg, 10 mg, Rectal, Daily PRN, Gary Keto, DO    carvedilol (COREG) tablet 3 125 mg, 3 125 mg, Oral, BID With Meals, Gary Keto, DO, 3 125 mg at 05/25/18 0846    clopidogrel (PLAVIX) tablet 75 mg, 75 mg, Oral, Daily, CHAU Borja Se-C, 75 mg at 05/25/18 0847    docusate sodium (COLACE) capsule 100 mg, 100 mg, Oral, BID, Huong Casanova PA-C, 100 mg at 05/25/18 0847    furosemide (LASIX) tablet 40 mg, 40 mg, Oral, Daily, Huong Casanova PA-C, 40 mg at 05/25/18 0847    heparin (porcine) 25,000 units in 250 mL infusion (premix), 3-20 Units/kg/hr (Order-Specific), Intravenous, Titrated, Arnulfo Ward PA-C, Last Rate: 6 mL/hr at 05/24/18 1307, 12 Units/kg/hr at 05/24/18 1307    insulin lispro (HumaLOG) 100 units/mL subcutaneous injection 1-5 Units, 1-5 Units, Subcutaneous, TID AC **AND** Fingerstick Glucose (POCT), , , TID AC, Huong Casanova PA-C    insulin lispro (HumaLOG) 100 units/mL subcutaneous injection 1-5 Units, 1-5 Units, Subcutaneous, HS, CHAU Borja Se-C, 1 Units at 05/23/18 2202    ondansetron (ZOFRAN) injection 4 mg, 4 mg, Intravenous, Q6H PRN, Gary Keto, DO    pantoprazole (PROTONIX) EC tablet 40 mg, 40 mg, Oral, Daily, Huong Casanova PA-C, 40 mg at 05/25/18 0847    potassium chloride (K-DUR,KLOR-CON) CR tablet 10 mEq, 10 mEq, Oral, Daily, CHAU Borja Se-C, 10 mEq at 05/25/18 0846    pravastatin (PRAVACHOL) tablet 40 mg, 40 mg, Oral, HS, Jose R Sutton DO, 40 mg at 05/24/18 2109    traMADol (ULTRAM) tablet 50 mg, 50 mg, Oral, Q6H PRN, [facility] Child[facility], 50 mg at 05/25/18 0847     Labs & Results:          Results from last 7 days  Lab Units 05/24/18  0538 05/23/18  0320 05/22/18  1737  05/22/18  0955  05/21/18  0612   WBC Thousand/uL 8 55 12 35*  --   --   --   --  5 60   HEMOGLOBIN g/dL 8 8* 8 4* 9 1*  --  9 1*  --  10 7*   I STAT HEMOGLOBIN   --   --   --   < >  --   < >  --    HEMATOCRIT % 27 6* 26 9* 28 0*  --  27 9*  --  34 6*   PLATELETS Thousands/uL 121* 156  --   --  163  --  210   < > = values in this interval not displayed  Results from last 7 days  Lab Units 05/24/18  0538 05/23/18 0320 05/22/18  1750 05/22/18  1020 05/22/18  0955   SODIUM mmol/L 140 143  --   --  138   POTASSIUM mmol/L 4 1 3 9 4 6  --  3 5   CHLORIDE mmol/L 105 109*  --   --  105   CO2 mmol/L 29 27  --   --  24   BUN mg/dL 20 18  --   --  21   CREATININE mg/dL 1 24 1 14  --   --  1 16   CALCIUM mg/dL 8 7 8 4  --   --  8 3   GLUCOSE RANDOM mg/dL 106 154*  --   --  191*   GLUCOSE, ISTAT mg/dl  --   --   --  170*  --        Results from last 7 days  Lab Units 05/25/18  0452 05/24/18  0538 05/23/18  0534 05/23/18  0320   INR  2 28* 1 75* 1 47*  --    PTT seconds 80* 84*  --  73*       Results from last 7 days  Lab Units 05/23/18  0320   MAGNESIUM mg/dL 2 1       Counseling / Coordination of Care  Total floor / unit time spent today 20 minutes  Greater than 50% of total time was spent with the patient and / or family counseling and / or coordination of care

## 2018-05-26 VITALS
OXYGEN SATURATION: 96 % | WEIGHT: 119.05 LBS | RESPIRATION RATE: 18 BRPM | BODY MASS INDEX: 24 KG/M2 | HEART RATE: 71 BPM | DIASTOLIC BLOOD PRESSURE: 56 MMHG | SYSTOLIC BLOOD PRESSURE: 118 MMHG | TEMPERATURE: 98.3 F | HEIGHT: 59 IN

## 2018-05-26 LAB
GLUCOSE SERPL-MCNC: 105 MG/DL (ref 65–140)
INR PPP: 2.9 (ref 0.86–1.17)
PROTHROMBIN TIME: 30.4 SECONDS (ref 11.8–14.2)

## 2018-05-26 PROCEDURE — 82948 REAGENT STRIP/BLOOD GLUCOSE: CPT

## 2018-05-26 PROCEDURE — 99238 HOSP IP/OBS DSCHRG MGMT 30/<: CPT | Performed by: THORACIC SURGERY (CARDIOTHORACIC VASCULAR SURGERY)

## 2018-05-26 PROCEDURE — 85610 PROTHROMBIN TIME: CPT | Performed by: PHYSICIAN ASSISTANT

## 2018-05-26 RX ORDER — ASPIRIN 81 MG/1
81 TABLET, CHEWABLE ORAL DAILY
Qty: 30 TABLET | Refills: 2 | Status: SHIPPED | OUTPATIENT
Start: 2018-05-26

## 2018-05-26 RX ORDER — CLOPIDOGREL BISULFATE 75 MG/1
75 TABLET ORAL DAILY
Qty: 30 TABLET | Refills: 2 | Status: SHIPPED | OUTPATIENT
Start: 2018-05-26

## 2018-05-26 RX ORDER — WARFARIN SODIUM 1 MG/1
TABLET ORAL
Qty: 60 TABLET | Refills: 2 | Status: SHIPPED | OUTPATIENT
Start: 2018-05-26

## 2018-05-26 RX ORDER — PRAVASTATIN SODIUM 20 MG
40 TABLET ORAL
Qty: 60 TABLET | Refills: 2 | Status: SHIPPED | OUTPATIENT
Start: 2018-05-26

## 2018-05-26 RX ORDER — DOCUSATE SODIUM 100 MG/1
100 CAPSULE, LIQUID FILLED ORAL 2 TIMES DAILY
Qty: 60 CAPSULE | Refills: 0 | COMMUNITY
Start: 2018-05-26 | End: 2018-06-20

## 2018-05-26 RX ORDER — POTASSIUM CHLORIDE 750 MG/1
10 TABLET, EXTENDED RELEASE ORAL DAILY
Qty: 30 TABLET | Refills: 2 | Status: SHIPPED | OUTPATIENT
Start: 2018-05-27

## 2018-05-26 RX ORDER — ACETAMINOPHEN 325 MG/1
TABLET ORAL
Qty: 30 TABLET | Refills: 0 | COMMUNITY
Start: 2018-05-26

## 2018-05-26 RX ORDER — CARVEDILOL 3.12 MG/1
3.12 TABLET ORAL 2 TIMES DAILY WITH MEALS
Qty: 60 TABLET | Refills: 2 | Status: SHIPPED | OUTPATIENT
Start: 2018-05-26

## 2018-05-26 RX ORDER — PANTOPRAZOLE SODIUM 40 MG/1
40 TABLET, DELAYED RELEASE ORAL DAILY
Qty: 30 TABLET | Refills: 0 | Status: SHIPPED | OUTPATIENT
Start: 2018-05-26 | End: 2022-03-04

## 2018-05-26 RX ORDER — FUROSEMIDE 40 MG/1
40 TABLET ORAL DAILY
Qty: 30 TABLET | Refills: 2 | Status: SHIPPED | OUTPATIENT
Start: 2018-05-26

## 2018-05-26 RX ADMIN — ACETAMINOPHEN 650 MG: 325 TABLET, FILM COATED ORAL at 02:22

## 2018-05-26 RX ADMIN — TRAMADOL HYDROCHLORIDE 50 MG: 50 TABLET, FILM COATED ORAL at 08:15

## 2018-05-26 RX ADMIN — ASPIRIN 81 MG 81 MG: 81 TABLET ORAL at 08:04

## 2018-05-26 RX ADMIN — PANTOPRAZOLE SODIUM 40 MG: 40 TABLET, DELAYED RELEASE ORAL at 08:04

## 2018-05-26 RX ADMIN — CARVEDILOL 3.12 MG: 3.12 TABLET, FILM COATED ORAL at 08:04

## 2018-05-26 RX ADMIN — CLOPIDOGREL BISULFATE 75 MG: 75 TABLET ORAL at 08:04

## 2018-05-26 RX ADMIN — DOCUSATE SODIUM 100 MG: 100 CAPSULE, LIQUID FILLED ORAL at 08:04

## 2018-05-26 RX ADMIN — POTASSIUM CHLORIDE 10 MEQ: 750 TABLET, EXTENDED RELEASE ORAL at 08:04

## 2018-05-26 RX ADMIN — FUROSEMIDE 40 MG: 40 TABLET ORAL at 08:04

## 2018-05-26 NOTE — RESTORATIVE TECHNICIAN NOTE
Restorative Specialist Mobility Note       Activity: Ambulate in cerna, Chair     Assistive Device: None

## 2018-05-26 NOTE — NURSING NOTE
Reviewed discharge instructions with pt and son, all questions answered, all scripts given to patient , IV and telemetry box removed  Told to call with any questions

## 2018-05-26 NOTE — PROGRESS NOTES
Progress Note - Cardiothoracic Surgery   Miriam Caballero 81 y o  female MRN: [id number]  Unit/Bed#: [facility] 429-01 Encounter: [id number]  Aortic stenosis, Non-Rheumatic  S/P transfemoral transcatheter aortic valve replacement; POD # 4      24 Hour Events: No events  No complaints  Tolerating diet  (+) flatus/BM  Ambulating well  Pain controlled  Medications:   Scheduled Meds:  Current Facility-Administered Medications:  acetaminophen 650 mg Oral Q4H PRN [place], DO   aspirin 81 mg Oral Daily Jose R Sutton, DO   bisacodyl 10 mg Rectal Daily PRN [place], DO   carvedilol 3 125 mg Oral BID With Meals Niko Rocky, DO   clopidogrel 75 mg Oral Daily Leeann Johansen, PA-C   docusate sodium 100 mg Oral BID Raynaldo Johansen, PA-C   furosemide 40 mg Oral Daily Raynaldo Johansen, PA-C   insulin lispro 1-5 Units Subcutaneous TID AC Corey Hughes PA-C   insulin lispro 1-5 Units Subcutaneous HS CHAU Caballero-C   ondansetron 4 mg Intravenous Q6H PRN Niko Kos, DO   pantoprazole 40 mg Oral Daily Raysayda Johansen, PA-C   potassium chloride 10 mEq Oral Daily Raynaldo Johansen, PA-C   pravastatin 40 mg Oral HS Jose R Sutton, DO   traMADol 50 mg Oral Q6H PRN Tre Scott PA-C     Continuous Infusions:   PRN Meds:   acetaminophen    bisacodyl    ondansetron    traMADol    Vitals:   Vitals:    05/25/18 2032 05/25/18 2359 05/26/18 0400 05/26/18 0600   BP: 154/67 117/58 (!) 103/48    BP Location:  Left arm Left arm    Pulse: 78 80 94    Resp: 18 18 18    Temp: 98 1 °F (36 7 °C) 98 2 °F (36 8 °C) 98 4 °F (36 9 °C)    TempSrc: Oral Oral Oral    SpO2: 94% 95% 98%    Weight:    54 kg (119 lb 0 8 oz)   Height:         Bp x24hrs: /40-60    Telemetry: Paced; Heart Rate: 69    Respiratory:   SpO2: SpO2: 98 %, SpO2 Activity: SpO2 Activity: At Rest, SpO2 Device: O2 Device: None (Room air);  Room Air    Intake/Output:   I/O       05/24 0701 - 05/25 0700 05/25 0701 - 05/26 0700 05/26 0701 - 05/27 0700 P O  700 360     I V  (mL/kg)       IV Piggyback       Total Intake(mL/kg) 700 (12 7) 360 (6 7)     Urine (mL/kg/hr) 1300 (1) 1650 (1 3)     Stool 0 (0)      Total Output 1300 1650      Net -600 -1290             Unmeasured Stool Occurrence 0 x          UOP - 575cc/8hr; 1650cc/24hrs    Weights:   Weight (last 2 days)     Date/Time   Weight    05/26/18 0600  54 (119 05)    05/25/18 0600  55 3 (121 91)    05/24/18 0700  55 (121 25)            Admit weight: 53 5kg - up 0 5kg from admission weight    Results:   NO NEW CBC OR BMP    Results from last 7 days  Lab Units 05/24/18  0538 05/23/18  0320 05/22/18  1737  05/22/18  0955  05/21/18  0612   WBC Thousand/uL 8 55 12 35*  --   --   --   --  5 60   HEMOGLOBIN g/dL 8 8* 8 4* 9 1*  --  9 1*  --  10 7*   I STAT HEMOGLOBIN   --   --   --   < >  --   < >  --    HEMATOCRIT % 27 6* 26 9* 28 0*  --  27 9*  --  34 6*   PLATELETS Thousands/uL 121* 156  --   --  163  --  210   < > = values in this interval not displayed  Results from last 7 days  Lab Units 05/24/18  0538 05/23/18  0320 05/22/18  1750  05/22/18  0955   SODIUM mmol/L 140 143  --   --  138   POTASSIUM mmol/L 4 1 3 9 4 6  --  3 5   CHLORIDE mmol/L 105 109*  --   --  105   CO2 mmol/L 29 27  --   --  24   BUN mg/dL 20 18  --   --  21   CREATININE mg/dL 1 24 1 14  --   --  1 16   GLUCOSE RANDOM mg/dL 106 154*  --   --  191*   GLUCOSE, ISTAT   --   --   --   < >  --    CALCIUM mg/dL 8 7 8 4  --   --  8 3   < > = values in this interval not displayed  Results from last 7 days  Lab Units 05/26/18  0450 05/25/18  0452 05/24/18  0538  05/23/18  0320   INR  2 90* 2 28* 1 75*  < >  --    PTT seconds  --  80* 84*  --  73*   < > = values in this interval not displayed     Coumadin mg  2 4  2      Point of care glucose: 127 - 151 - 150 - 143    Studies:  No new studies    Invasive Lines/Tubes:  Invasive Devices     Peripheral Intravenous Line            Peripheral IV 05/23/18 Right Hand 2 days                Physical Exam:    HEENT/NECK:  PERRLA  No jugular venous distention  Cardiac: Regular rate and rhythm  No rubs/murmurs/gallops  Pulmonary:  Breath sounds slightly diminished at the bases bilaterally  Abdomen:  Non-tender, Non-distended  Positive bowel sounds  Incisions: Groin incision is clean, dry, and intact  No hematoma  Lower extremities: Extremities warm/dry  Radial/PT/DP pulses 2+ bilaterally  Trace edema B/L  Neuro: Alert and oriented X 3  Sensation is grossly intact  No focal deficits  Skin: Warm/Dry, without rashes or lesions  Assessment:  Patient Active Problem List   Diagnosis    Nonrheumatic aortic valve stenosis    Coronary artery disease involving coronary bypass graft of native heart without angina pectoris    [facility]congestive [facility] failure) [facility] [address]    [facility]    Essential hypertension    HLD (hyperlipidemia)    History of ischemic cardiomyopathy    H/O mitral valve replacement with mechanical valve    Aortic valve stenosis    Anemia    S/P TAVR (transcatheter aortic valve replacement)    Thrombocytopenia (HCC)       Aortic stenosis, Non-Rheumatic  S/P transfemoral transcatheter aortic valve replacement; POD # 4    Plan:    1  Cardiac:   Paced; HR/BP well-controlled  Coreg 3 125 mg PO BID   Continue Plavix   INR 2 90, dose Coumadin 2mg tonight  Continue ASA and Statin therapy  Patient no longer has central IV access   Continue DVT prophylaxis    2  Pulmonary:   Good Room air oxygen saturation; Continue incentive spirometry/Coughing/Deep breathing exercises      3  Renal:   Intake/Output net: (-)1545 8 mL/24 hours  Continue diuresis   Lasix 40 mg IV QD  Potassium Chloride 20 mEq PO QD  Post op Creatinine stable; Follow up labs prn    4  Neuro:  Neurologically intact; No active issues  Incisional pain well-controlled; Continue prn Percocet    5   GI:  Tolerating TLC 2 3 gm sodium diet  Maintain 1800 mL daily fluid restriction   Continue stool softeners and prn suppository  Continue GI prophylaxis    6  Endo:    History of diabetes; Diet controlled    7  Hematology:   Post-operative acute blood loss anemia; Hemoglobin and hematocrit stable; trend prn    8   Disposition:  Ambulating independently, Anticipate discharge to home today     VTE Pharmacologic Prophylaxis: Heparin and Warfarin (Coumadin)  VTE Mechanical Prophylaxis: sequential compression device    Collaborative rounds completed with BENJI Alonzo , and lianna RN    SIGNATURE: Jostin Iyer PA-C  DATE: May 26, 2018  TIME: 7:08 AM

## 2018-05-26 NOTE — DISCHARGE SUMMARY
Discharge Summary - Cardiothoracic Surgery   Mis Arreguin 81 y o  female MRN: [id number]  Unit/Bed#: [facility] 429-01 Encounter: [id number]    Admission Date: 5/19/2018     Discharge Date: 05/26/18    Admitting Diagnosis: Aortic valve stenosis, etiology of cardiac valve disease unspecified [I35 0]    Primary Discharge Diagnosis:   Aortic stenosis, Non-Rheumatic  S/P transfemoral transcatheter aortic valve replacement;    Secondary Discharge Diagnosis:   1  CAD, s/p CABG, s/p multiple stents, s/p occlusion RCA stent  2  MV repair s/p redo sternotomy w/ [facility] MVR  3  B/l ICA stenosis  4  LBBB  5  Prox right common iliac stenosis  6  Compression deformity of L2    Attending: KYE Lam  Consulting Physician(s):   Cardiology  Medical/Critical Care  Occupational Therapy  Physical Therapy    Procedures Performed:   Orders Placed This Encounter   Procedures    Cardiac tavr hor       Hospital Course:   5/19: 82 y/o CF presents pre-operatively for Coumadin to heparin bridge prior to TAVR  INR 4 30, hold Coumadin  5/20: INR 3 45, continue to hold Coumadin  5/21: INR 2 00, start heparin gtt  Pre-op orders placed  Consent signed & on chart  Pre-op contrast medications ordered  Ready for OR tomorrow  Heparin gtt off at 0400 per Dr Alondra Gonsalez  5/22: TF-TAVR #23, L femoral approach, uncomplicated  Transferred to ICU on cardene  Groins soft  Good DP pulses  Heparin gtt to start this evening, Coumadin tonight  Tramadol PRN ordered for chronic back pain  Cardene weaned off     5/23: weaned to room air  INR 1 47; Coumadin, 2 mg dosed  Pre-op Plavix resumed  Central IV removed  Transferred from ICU to telemetry  Triple antiplatelet therapy (ASA, Plavix and Coumadin)  5/24; No events  INR 1 75, continue heparin gtt, dose Coumadin 4mg tonight     5/25: INR 2 28, dose Coumadin 2mg tonight  Home tomorrow  5/26: INR 2 90, dose Coumadin 2mg tonight  Stable for discharge to home      Condition at Discharge: good     Discharge Physical Exam:  HEENT/NECK:  PERRLA  No jugular venous distention  Cardiac: Regular rate and rhythm  No rubs/murmurs/gallops  Pulmonary:  Breath sounds slightly diminished at the bases bilaterally  Abdomen:  Non-tender, Non-distended  Positive bowel sounds  Incisions: Groin incision is clean, dry, and intact  No hematoma  Lower extremities: Extremities warm/dry  Radial/PT/DP pulses 2+ bilaterally  Trace edema B/L  Neuro: Alert and oriented X 3  Sensation is grossly intact  No focal deficits  Skin: Warm/Dry, without rashes or lesions  Discharge Data:    Results from last 7 days  Lab Units 05/24/18  0538 05/23/18  0320 05/22/18  1737  05/22/18  0955  05/21/18  0612   WBC Thousand/uL 8 55 12 35*  --   --   --   --  5 60   HEMOGLOBIN g/dL 8 8* 8 4* 9 1*  --  9 1*  --  10 7*   I STAT HEMOGLOBIN   --   --   --   < >  --   < >  --    HEMATOCRIT % 27 6* 26 9* 28 0*  --  27 9*  --  34 6*   PLATELETS Thousands/uL 121* 156  --   --  163  --  210   < > = values in this interval not displayed  Results from last 7 days  Lab Units 05/24/18  0538 05/23/18  0320 05/22/18  1750  05/22/18  0955   SODIUM mmol/L 140 143  --   --  138   POTASSIUM mmol/L 4 1 3 9 4 6  --  3 5   CHLORIDE mmol/L 105 109*  --   --  105   CO2 mmol/L 29 27  --   --  24   BUN mg/dL 20 18  --   --  21   CREATININE mg/dL 1 24 1 14  --   --  1 16   GLUCOSE RANDOM mg/dL 106 154*  --   --  191*   GLUCOSE, ISTAT   --   --   --   < >  --    CALCIUM mg/dL 8 7 8 4  --   --  8 3   < > = values in this interval not displayed  Results from last 7 days  Lab Units 05/26/18  0450 05/25/18  0452 05/24/18  0538  05/23/18  0320   INR  2 90* 2 28* 1 75*  < >  --    PTT seconds  --  80* 84*  --  73*   < > = values in this interval not displayed  Discharge instructions/Information to patient and family:   See after visit summary for information provided to patient and family        Raleighfreida Justinnemesio was educated on restrictions regarding driving and lifting, and techniques of proper incisional care  They were specifically counselled on signs and symptoms of a sternal wound infection, and advised to contact our service immediately should they develop fevers, sweats, chill, redness or drainage at the site of any incisions  Provisions for Follow-Up Care:  See after visit summary for information related to follow-up care and any pertinent home health orders  Disposition:  Home w/ [facility]    Planned Readmission:   No    Discharge Medications:  See after visit summary for reconciled discharge medications provided to patient and family  Shawanda Juárez was provided contact information and scheduled a follow up appointment with KYE Stanley  Additionally, they were advised to schedule follow up appointments to be seen by their primary care physician within 7-10 days, and their cardiologist within 2-3 weeks  Contact information was provided  The patient was discharged on home diuretic therapy with Furosemide 40 mg, PO QD and Potassium Chloride 10 mEq, PO QD  They were advised to continue these medications johnson, unless otherwise directed  Ms Ruben Melvin is being discharged on anticoagulation therapy for treatment of mechanical MVR  As they have been treated with Coumadin prior to this admission, arrangements have been made for Dr Bud Powers office to continue to monitor INR levels and provide dosing instructions  Their office was notified by phone call and facsimile report which itemized the patients daily INRs and correlating Coumadin doses during their hospitalization  The patient has been provided a prescription for PT/INR to be drawn every Monday and Thursday, with results to Dr Bud Powers office  They have been prescribed Coumadin, 1 mg tabs, with 60 tablets being dispensed  Finally, they have been advised to take 2 mg daily, unless otherwise directed  Resume Tramadol for pain control  No script given      The patient was informed that following their postoperative surgical evaluation, they will be referred to outpatient cardiac rehabilitation  They were counseled that this program is run by specialists who will help them safely strengthen their heart and prevent more heart disease  Cardiac rehabilitation will include exercise, relaxation, stress management, and heart-healthy nutrition  Caregivers will also check to make sure their medication regimen is working  I spent 30 minutes discharging the patient  This time was spent on the day of discharge  I had direct contact with the patient on the day of discharge  Additional documentation is required if more than 30 minutes were spent on discharge       Kaylan Menjivar PA-C  DATE: May 26, 2018  TIME: 9:16 AM

## 2018-06-04 ENCOUNTER — TELEPHONE (OUTPATIENT)
Dept: CARDIAC SURGERY | Facility: CLINIC | Age: 73
End: 2018-06-04

## 2018-06-04 NOTE — TELEPHONE ENCOUNTER
POST OP CALL    5/22/18: TAVR  5/26/18: Christianne to patient today  She just returned from her cardiology appt  She states she is doing well, taking walks, doing chores  She still gets SOB if she does to much  Weight is stable at 116 lb  No edema  Groin sites ecchymotic but no bleeding  Questions answered  Medications reviewed  1  Daily weight, call for weight gain of 2 lbs or more in 24 hours, worsening SOB or edema  2  Frequent short walks, increase activity as tolerated  3  No strenuous activity; follow sternal precautions : no lifting more than 10 lbs  4  Continue to use Incentive Spirometer frequently throughout the day  5  Cleanse surgical incisions daily with chlorhexidine soap (or other antimicrobial soap) & monitor for signs of infection:  redness, swelling, increased pain, drainage or the incision opens, fever or chills     6  Call CT surgery office with issues or concerns

## 2018-06-08 ENCOUNTER — PATIENT OUTREACH (OUTPATIENT)
Dept: OTHER | Facility: HOSPITAL | Age: 73
End: 2018-06-08

## 2018-06-15 ENCOUNTER — PATIENT OUTREACH (OUTPATIENT)
Dept: OTHER | Facility: HOSPITAL | Age: 73
End: 2018-06-15

## 2018-06-15 NOTE — PROGRESS NOTES
Outpatient Care Management Note:   Follow up call completed  Continues t tire and get "winded" easily  Started cardiac rehab on 6/13/18, Had PET scan done on 6/14/18 for Hodgkin's lymphoma  Too tired for cardiac rehab today but hopes to return on Monday  received the five wishes packet in the mail and is currently filling it out  Has no needs at this time  Amenable to continued follow up

## 2018-06-19 PROBLEM — Z48.89 ENCOUNTER FOR POSTOPERATIVE CARE: Status: ACTIVE | Noted: 2018-06-19

## 2018-06-20 ENCOUNTER — OFFICE VISIT (OUTPATIENT)
Dept: CARDIAC SURGERY | Facility: CLINIC | Age: 73
End: 2018-06-20
Payer: MEDICARE

## 2018-06-20 ENCOUNTER — APPOINTMENT (OUTPATIENT)
Dept: LAB | Facility: HOSPITAL | Age: 73
End: 2018-06-20
Payer: MEDICARE

## 2018-06-20 ENCOUNTER — HOSPITAL ENCOUNTER (OUTPATIENT)
Dept: NON INVASIVE DIAGNOSTICS | Facility: HOSPITAL | Age: 73
Discharge: HOME | End: 2018-06-20
Attending: THORACIC SURGERY (CARDIOTHORACIC VASCULAR SURGERY)
Payer: MEDICARE

## 2018-06-20 VITALS
BODY MASS INDEX: 23.99 KG/M2 | SYSTOLIC BLOOD PRESSURE: 86 MMHG | OXYGEN SATURATION: 96 % | HEIGHT: 59 IN | RESPIRATION RATE: 14 BRPM | DIASTOLIC BLOOD PRESSURE: 38 MMHG | TEMPERATURE: 97.2 F | WEIGHT: 119 LBS | HEART RATE: 69 BPM

## 2018-06-20 DIAGNOSIS — I35.0 NONRHEUMATIC AORTIC VALVE STENOSIS: Primary | ICD-10-CM

## 2018-06-20 DIAGNOSIS — I35.0 SEVERE AORTIC STENOSIS: ICD-10-CM

## 2018-06-20 DIAGNOSIS — Z95.2 S/P TAVR (TRANSCATHETER AORTIC VALVE REPLACEMENT): ICD-10-CM

## 2018-06-20 DIAGNOSIS — Z95.2 H/O MITRAL VALVE REPLACEMENT WITH MECHANICAL VALVE: ICD-10-CM

## 2018-06-20 DIAGNOSIS — Z48.89 ENCOUNTER FOR POSTOPERATIVE CARE: ICD-10-CM

## 2018-06-20 DIAGNOSIS — D63.8 ANEMIA IN OTHER CHRONIC DISEASES CLASSIFIED ELSEWHERE: ICD-10-CM

## 2018-06-20 DIAGNOSIS — I35.0 NONRHEUMATIC AORTIC VALVE STENOSIS: ICD-10-CM

## 2018-06-20 LAB
ANION GAP SERPL CALCULATED.3IONS-SCNC: 9 MMOL/L (ref 4–13)
ATRIAL RATE: 70 BPM
BUN SERPL-MCNC: 23 MG/DL (ref 5–25)
CALCIUM SERPL-MCNC: 8.7 MG/DL (ref 8.3–10.1)
CHLORIDE SERPL-SCNC: 103 MMOL/L (ref 100–108)
CO2 SERPL-SCNC: 26 MMOL/L (ref 21–32)
CREAT SERPL-MCNC: 1.61 MG/DL (ref 0.6–1.3)
ERYTHROCYTE [DISTWIDTH] IN BLOOD BY AUTOMATED COUNT: 15.2 % (ref 11.6–15.1)
GFR SERPL CREATININE-BSD FRML MDRD: 32 ML/MIN/1.73SQ M
GLUCOSE SERPL-MCNC: 140 MG/DL (ref 65–140)
HCT VFR BLD AUTO: 25.8 % (ref 34.8–46.1)
HGB BLD-MCNC: 7.9 G/DL (ref 11.5–15.4)
INR PPP: 3.42 (ref 0.86–1.17)
MCH RBC QN AUTO: 26.7 PG (ref 26.8–34.3)
MCHC RBC AUTO-ENTMCNC: 30.6 G/DL (ref 31.4–37.4)
MCV RBC AUTO: 87 FL (ref 82–98)
P AXIS: 65 DEGREES
PLATELET # BLD AUTO: 221 THOUSANDS/UL (ref 149–390)
PMV BLD AUTO: 10.9 FL (ref 8.9–12.7)
POTASSIUM SERPL-SCNC: 3.7 MMOL/L (ref 3.5–5.3)
PR INTERVAL: 254 MS
PROTHROMBIN TIME: 34.5 SECONDS (ref 11.8–14.2)
QRS AXIS: -86 DEGREES
QRSD INTERVAL: 174 MS
QT INTERVAL: 534 MS
QTC INTERVAL: 576 MS
RBC # BLD AUTO: 2.96 MILLION/UL (ref 3.81–5.12)
SODIUM SERPL-SCNC: 138 MMOL/L (ref 136–145)
T WAVE AXIS: 81 DEGREES
VENTRICULAR RATE: 70 BPM
WBC # BLD AUTO: 5.34 THOUSAND/UL (ref 4.31–10.16)

## 2018-06-20 PROCEDURE — 93306 TTE W/DOPPLER COMPLETE: CPT

## 2018-06-20 PROCEDURE — 36415 COLL VENOUS BLD VENIPUNCTURE: CPT

## 2018-06-20 PROCEDURE — 93306 TTE W/DOPPLER COMPLETE: CPT | Performed by: INTERNAL MEDICINE

## 2018-06-20 PROCEDURE — 93010 ELECTROCARDIOGRAM REPORT: CPT | Performed by: INTERNAL MEDICINE

## 2018-06-20 PROCEDURE — 85027 COMPLETE CBC AUTOMATED: CPT

## 2018-06-20 PROCEDURE — 80048 BASIC METABOLIC PNL TOTAL CA: CPT

## 2018-06-20 PROCEDURE — 99214 OFFICE O/P EST MOD 30 MIN: CPT | Performed by: NURSE PRACTITIONER

## 2018-06-20 PROCEDURE — 93005 ELECTROCARDIOGRAM TRACING: CPT

## 2018-06-20 PROCEDURE — 85610 PROTHROMBIN TIME: CPT

## 2018-06-20 RX ORDER — FERROUS SULFATE TAB EC 324 MG (65 MG FE EQUIVALENT) 324 (65 FE) MG
324 TABLET DELAYED RESPONSE ORAL
Qty: 90 TABLET | Refills: 0 | Status: SHIPPED | OUTPATIENT
Start: 2018-06-20

## 2018-06-20 RX ORDER — ASCORBIC ACID 500 MG
500 TABLET ORAL DAILY
Qty: 90 TABLET | Refills: 0 | Status: SHIPPED | OUTPATIENT
Start: 2018-06-20

## 2018-06-20 NOTE — LETTER
June 20, 2018     Richelle Jay MD  [address] [address]    Patient: Shawanda Juárze   YOB: 1945   Date of Visit: 6/20/2018       Dear Dr Walter Wise:    Thank you for referring Shawanda Juárez to me for evaluation  Below are my notes for this consultation  If you have questions, please do not hesitate to call me  I look forward to following your patient along with you  Sincerely,        Kandace Prader, DO        CC: No Recipients  SHANE Perrin  6/20/2018  1:36 PM  Attested  Procedure: S/P transfemoral transcatheter aortic valve replacement 23 mm Sibley S3 bioprosthesis, performed on 5/22/18    History: Shawanda Juárez is a 81y o  year old female who presents to our office today for routine follow up care from transfemoral transcatheter aortic valve replacement performed on 5/22/18  She tolerated her procedure well and had no post op complications  She was discharged home on 5/26/18  She was evaluated in the ER for groin bleeding but was not admitted  Today she reports she is feeling better  She is able to walk and perform her activities with out SOB, chest pain or lightheadedness  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  She denies bleeding, fever, weight gain or edema       Review of System:     History obtained from chart review and the patient  General ROS: negative  Psychological ROS: negative  Ophthalmic ROS: no visual disturbances  Hematological and Lymphatic ROS: positive for - bruising  negative for - bleeding problems or blood clots  Respiratory ROS: no cough, shortness of breath, or wheezing  Cardiovascular ROS: no chest pain or dyspnea on exertion  Gastrointestinal ROS: no abdominal pain, change in bowel habits, or black or bloody stools  Musculoskeletal ROS: leg fatigue  Neurological ROS: no TIA or stroke symptoms    Vital Signs:     Vitals:    06/20/18 1200 06/20/18 1258   BP: (!) 92/38 (!) 86/38   BP Location: Left arm Right arm   Cuff Size: Adult Pulse: 69    Resp: 14    Temp: (!) 97 2 °F (36 2 °C)    TempSrc: Oral    SpO2: 96%    Weight: 54 kg (119 lb)    Height: 4' 11" (1 499 m)        Home Medications:     Prior to Admission medications    Medication Sig Start Date End Date Taking? Authorizing Provider   acetaminophen (TYLENOL) 325 mg tablet Take 1-2 tablets Q4-6 hours PRN pain  5/26/18  Yes Jese Diaz PA-C   aspirin 81 mg chewable tablet Chew 1 tablet (81 mg total) daily 5/26/18  Yes Jese Diaz PA-C   carvedilol (COREG) 3 125 mg tablet Take 1 tablet (3 125 mg total) by mouth 2 (two) times a day with meals 5/26/18  Yes Jese Diaz PA-C   cholecalciferol (VITAMIN D3) 1,000 units tablet Take 2 tablets (2,000 Units total) by mouth daily 5/4/18  Yes Jese Diaz PA-C   clopidogrel (PLAVIX) 75 mg tablet Take 1 tablet (75 mg total) by mouth daily 5/26/18  Yes Jese Diaz PA-C   furosemide (LASIX) 40 mg tablet Take 1 tablet (40 mg total) by mouth daily 5/26/18  Yes Jese Diaz PA-C   loperamide (IMODIUM) 2 mg capsule Take 2 capsules (4 mg total) by mouth 4 (four) times a day as needed for diarrhea 5/4/18  Yes Jese Diaz PA-C   pantoprazole (PROTONIX) 40 mg tablet Take 1 tablet (40 mg total) by mouth daily for 30 days 5/26/18 6/25/18 Yes Mary Jane Haq PA-C   potassium chloride (K-DUR,KLOR-CON) 10 mEq tablet Take 1 tablet (10 mEq total) by mouth daily 5/27/18  Yes Jese Diaz PA-C   pravastatin (PRAVACHOL) 20 mg tablet Take 2 tablets (40 mg total) by mouth daily at bedtime 5/26/18  Yes Jese Diaz PA-C   traMADol (ULTRAM) 50 mg tablet Take 50 mg by mouth every 6 (six) hours as needed for moderate pain   Yes Historical Provider, MD   warfarin (COUMADIN) 1 mg tablet Take 2 tablets (2mg) daily at bedtime unless otherwise directed   5/26/18  Yes Lurlene Ards, PA-C   diphenoxylate-atropine (LOMOTIL) 2 5-0 025 mg per tablet Take 1 tablet by mouth 4 (four) times a day as needed    Historical Provider, MD   docusate sodium (COLACE) 100 mg capsule Take 1 capsule (100 mg total) by mouth 2 (two) times a day for 30 days Hold for soft stools  5/26/18 6/20/18  Treasure Kwok PA-C       Physical Exam:    General:   HEENT/NECK:  PERRLA  No jugular venous distention  Cardiac:Regular rate and rhythm, No murmurs rubs or gallops  Pulmonary:Breath sounds clear bilaterally  Abdomen:  Non-tender, Non-distended  Positive bowel sounds  Lower extremities: Extremities warm/dry  PT/DP pulses 2+ bilaterally  Femoral pulses 2+, no bruits  Trace edema B/L  Incisions: Inguinal puncture site is clean, dry, and intact  Neuro: Alert and oriented X 3  Sensation is grossly intact  No focal deficits  Skin: Warm/Dry, without rashes or lesions  Lab Results:       Results from last 7 days  Lab Units 06/20/18  1200   WBC Thousand/uL 5 34   HEMOGLOBIN g/dL 7 9*   HEMATOCRIT % 25 8*   PLATELETS Thousands/uL 221           Imaging Studies:     Transthoracic Echocardiogram:   Aortic valve bioprosthesis well seated without significant regurgitation or PVL    EKG: pending    I have personally reviewed pertinent reports  TAVR evaluation Assessment:     Bin Jones 122: I    Assessment: Aortic stenosis, Non-Rheumatic  S/P transfemoral transcatheter aortic valve replacement;    Plan:     Alesha Sellers is making good progress in her recover following transfemoral transcatheter aortic valve replacement  She is at NYHA functional class I  Groin incisions are well healed  Weight and VS are stable  Echo demonstrates TAVR bioprosthesis well seated with normal function l  I reviewed her medications  She will continue coumadin for her mechanical mitral valve, Plavix  and aspirin for prior coronary PCI  I will start her on Iron and Vit C for her chronic anemia  I have discussed the benefits of participating in cardiac rehabilitation and have encouraged her to to do so  Alesha Sellers may resume driving and all normal activities      Alesha Sellers has already been evaluated by her primary care physician and her cardiologist for ongoing medical care  Arrangements will be made for one year follow-up in our office with repeat echocardiogram, ECG, CBC & BMP  I have advised them to notify her PCP with any new concerns that may arise in the interim and to maintain routine follow up with her cardiologist  Grayling Mohs was comfortable with our recommendations and her questions were answered to her satisfaction  SHANE Davis  6/201/8  1:05 PM  Attestation signed by Thomas Aaron DO at 6/20/2018  2:01 PM:  The patient was seen and examined, and I agree with the midlevel's history, physical exam, assessment and plan with the following additions:    Mrs Lexii Pedraza is recovering well from her TAVR  She has no complaints, and is feeling much improved  She is cleared enter into outpatient cardiac rehab  Her echocardiogram was reviewed with her demonstrating a well-functioning transcatheter valve with no significant paravalvular leak  We have also started her on iron today for a low hemoglobin  She will follow up with her PCP regarding her anemia

## 2018-06-20 NOTE — PROGRESS NOTES
Procedure: S/P transfemoral transcatheter aortic valve replacement 23 mm Sibley S3 bioprosthesis, performed on 5/22/18    History: Martha Traylor is a 81y o  year old female who presents to our office today for routine follow up care from transfemoral transcatheter aortic valve replacement performed on 5/22/18  She tolerated her procedure well and had no post op complications  She was discharged home on 5/26/18  She was evaluated in the ER for groin bleeding but was not admitted  Today she reports she is feeling better  She is able to walk and perform her activities with out SOB, chest pain or lightheadedness  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  She denies bleeding, fever, weight gain or edema  Review of System:     History obtained from chart review and the patient  General ROS: negative  Psychological ROS: negative  Ophthalmic ROS: no visual disturbances  Hematological and Lymphatic ROS: positive for - bruising  negative for - bleeding problems or blood clots  Respiratory ROS: no cough, shortness of breath, or wheezing  Cardiovascular ROS: no chest pain or dyspnea on exertion  Gastrointestinal ROS: no abdominal pain, change in bowel habits, or black or bloody stools  Musculoskeletal ROS: leg fatigue  Neurological ROS: no TIA or stroke symptoms    Vital Signs:     Vitals:    06/20/18 1200 06/20/18 1258   BP: (!) 92/38 (!) 86/38   BP Location: Left arm Right arm   Cuff Size: Adult    Pulse: 69    Resp: 14    Temp: (!) 97 2 °F (36 2 °C)    TempSrc: Oral    SpO2: 96%    Weight: 54 kg (119 lb)    Height: 4' 11" (1 499 m)        Home Medications:     Prior to Admission medications    Medication Sig Start Date End Date Taking? Authorizing Provider   acetaminophen (TYLENOL) 325 mg tablet Take 1-2 tablets Q4-6 hours PRN pain   5/26/18  Yes Deborah Cortez PA-C   aspirin 81 mg chewable tablet Chew 1 tablet (81 mg total) daily 5/26/18  Yes Mary Jane Burroughs PA-C   carvedilol (COREG) 3 125 mg tablet Take 1 tablet (3 125 mg total) by mouth 2 (two) times a day with meals 5/26/18  Yes Warren Padilla PA-C   cholecalciferol (VITAMIN D3) 1,000 units tablet Take 2 tablets (2,000 Units total) by mouth daily 5/4/18  Yes Warren Padilla PA-C   clopidogrel (PLAVIX) 75 mg tablet Take 1 tablet (75 mg total) by mouth daily 5/26/18  Yes Warren Padilla PA-C   furosemide (LASIX) 40 mg tablet Take 1 tablet (40 mg total) by mouth daily 5/26/18  Yes Warren Padilla PA-C   loperamide (IMODIUM) 2 mg capsule Take 2 capsules (4 mg total) by mouth 4 (four) times a day as needed for diarrhea 5/4/18  Yes Warren Padilla PA-C   pantoprazole (PROTONIX) 40 mg tablet Take 1 tablet (40 mg total) by mouth daily for 30 days 5/26/18 6/25/18 Yes Mary Jane Ashley PA-C   potassium chloride (K-DUR,KLOR-CON) 10 mEq tablet Take 1 tablet (10 mEq total) by mouth daily 5/27/18  Yes Warren Padilla PA-C   pravastatin (PRAVACHOL) 20 mg tablet Take 2 tablets (40 mg total) by mouth daily at bedtime 5/26/18  Yes Warren Padilla PA-C   traMADol (ULTRAM) 50 mg tablet Take 50 mg by mouth every 6 (six) hours as needed for moderate pain   Yes Historical Provider, MD   warfarin (COUMADIN) 1 mg tablet Take 2 tablets (2mg) daily at bedtime unless otherwise directed  5/26/18  Yes Warren Padilla PA-C   diphenoxylate-atropine (LOMOTIL) 2 5-0 025 mg per tablet Take 1 tablet by mouth 4 (four) times a day as needed    Historical Provider, MD   docusate sodium (COLACE) 100 mg capsule Take 1 capsule (100 mg total) by mouth 2 (two) times a day for 30 days Hold for soft stools  5/26/18 6/20/18  Warren Padilla PA-C       Physical Exam:    General:   HEENT/NECK:  PERRLA  No jugular venous distention  Cardiac:Regular rate and rhythm, No murmurs rubs or gallops  Pulmonary:Breath sounds clear bilaterally  Abdomen:  Non-tender, Non-distended  Positive bowel sounds  Lower extremities: Extremities warm/dry  PT/DP pulses 2+ bilaterally  Femoral pulses 2+, no bruits   Trace edema B/L  Incisions: Inguinal puncture site is clean, dry, and intact  Neuro: Alert and oriented X 3  Sensation is grossly intact  No focal deficits  Skin: Warm/Dry, without rashes or lesions  Lab Results:       Results from last 7 days  Lab Units 06/20/18  1200   WBC Thousand/uL 5 34   HEMOGLOBIN g/dL 7 9*   HEMATOCRIT % 25 8*   PLATELETS Thousands/uL 221           Imaging Studies:     Transthoracic Echocardiogram:   Aortic valve bioprosthesis well seated without significant regurgitation or PVL    EKG: pending    I have personally reviewed pertinent reports  TAVR evaluation Assessment:     Bin  Robert 122: I    Assessment: Aortic stenosis, Non-Rheumatic  S/P transfemoral transcatheter aortic valve replacement;    Plan:     Shruti Cheek is making good progress in her recover following transfemoral transcatheter aortic valve replacement  She is at NYHA functional class I  Groin incisions are well healed  Weight and VS are stable  Echo demonstrates TAVR bioprosthesis well seated with normal function l  I reviewed her medications  She will continue coumadin for her mechanical mitral valve, Plavix  and aspirin for prior coronary PCI  I will start her on Iron and Vit C for her chronic anemia  I have discussed the benefits of participating in cardiac rehabilitation and have encouraged her to to do so  Shruti Cheek may resume driving and all normal activities  Shruti Cheek has already been evaluated by her primary care physician and her cardiologist for ongoing medical care  Arrangements will be made for one year follow-up in our office with repeat echocardiogram, ECG, CBC & BMP  I have advised them to notify her PCP with any new concerns that may arise in the interim and to maintain routine follow up with her cardiologist  Shruti Cheek was comfortable with our recommendations and her questions were answered to her satisfaction      SHANE Bone  6/201/8  1:05 PM

## 2018-06-20 NOTE — LETTER
June 20, 2018     Ladena Leventhal, MD  [facility] [address]    Patient: Andrzej Dos Santos   YOB: 1945   Date of Visit: 6/20/2018       Dear Dr Sophia Arceo:    Thank you for referring Andrzej Dos Santos to me for evaluation  Below are my notes for this consultation  If you have questions, please do not hesitate to call me  I look forward to following your patient along with you  Sincerely,        Carl Cesar DO        CC: No Recipients  SHANE Augustin  6/20/2018  1:36 PM  Attested  Procedure: S/P transfemoral transcatheter aortic valve replacement 23 mm Sibley S3 bioprosthesis, performed on 5/22/18    History: Andrzej Dos Santos is a 81y o  year old female who presents to our office today for routine follow up care from transfemoral transcatheter aortic valve replacement performed on 5/22/18  She tolerated her procedure well and had no post op complications  She was discharged home on 5/26/18  She was evaluated in the ER for groin bleeding but was not admitted  Today she reports she is feeling better  She is able to walk and perform her activities with out SOB, chest pain or lightheadedness  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  She denies bleeding, fever, weight gain or edema       Review of System:     History obtained from chart review and the patient  General ROS: negative  Psychological ROS: negative  Ophthalmic ROS: no visual disturbances  Hematological and Lymphatic ROS: positive for - bruising  negative for - bleeding problems or blood clots  Respiratory ROS: no cough, shortness of breath, or wheezing  Cardiovascular ROS: no chest pain or dyspnea on exertion  Gastrointestinal ROS: no abdominal pain, change in bowel habits, or black or bloody stools  Musculoskeletal ROS: leg fatigue  Neurological ROS: no TIA or stroke symptoms    Vital Signs:     Vitals:    06/20/18 1200 06/20/18 1258   BP: (!) 92/38 (!) 86/38   BP Location: Left arm Right arm   Cuff Size: Adult Pulse: 69    Resp: 14    Temp: (!) 97 2 °F (36 2 °C)    TempSrc: Oral    SpO2: 96%    Weight: 54 kg (119 lb)    Height: 4' 11" (1 499 m)        Home Medications:     Prior to Admission medications    Medication Sig Start Date End Date Taking? Authorizing Provider   acetaminophen (TYLENOL) 325 mg tablet Take 1-2 tablets Q4-6 hours PRN pain  5/26/18  Yes Deborah Cortez PA-C   aspirin 81 mg chewable tablet Chew 1 tablet (81 mg total) daily 5/26/18  Yes Deborah Cortez PA-C   carvedilol (COREG) 3 125 mg tablet Take 1 tablet (3 125 mg total) by mouth 2 (two) times a day with meals 5/26/18  Yes Deborah Cortez PA-C   cholecalciferol (VITAMIN D3) 1,000 units tablet Take 2 tablets (2,000 Units total) by mouth daily 5/4/18  Yes Deborah Cortez PA-C   clopidogrel (PLAVIX) 75 mg tablet Take 1 tablet (75 mg total) by mouth daily 5/26/18  Yes Deborah Cortez PA-C   furosemide (LASIX) 40 mg tablet Take 1 tablet (40 mg total) by mouth daily 5/26/18  Yes Deborah Cortez PA-C   loperamide (IMODIUM) 2 mg capsule Take 2 capsules (4 mg total) by mouth 4 (four) times a day as needed for diarrhea 5/4/18  Yes Deborah Cortez PA-C   pantoprazole (PROTONIX) 40 mg tablet Take 1 tablet (40 mg total) by mouth daily for 30 days 5/26/18 6/25/18 Yes Mary Jane Burroughs PA-C   potassium chloride (K-DUR,KLOR-CON) 10 mEq tablet Take 1 tablet (10 mEq total) by mouth daily 5/27/18  Yes Deborah Cortez PA-C   pravastatin (PRAVACHOL) 20 mg tablet Take 2 tablets (40 mg total) by mouth daily at bedtime 5/26/18  Yes Deborah Cortez PA-C   traMADol (ULTRAM) 50 mg tablet Take 50 mg by mouth every 6 (six) hours as needed for moderate pain   Yes Historical Provider, MD   warfarin (COUMADIN) 1 mg tablet Take 2 tablets (2mg) daily at bedtime unless otherwise directed   5/26/18  Yes Deborah Cortez PA-C   diphenoxylate-atropine (LOMOTIL) 2 5-0 025 mg per tablet Take 1 tablet by mouth 4 (four) times a day as needed    Historical Provider, MD   docusate sodium (COLACE) 100 mg capsule Take 1 capsule (100 mg total) by mouth 2 (two) times a day for 30 days Hold for soft stools  5/26/18 6/20/18  Diaz William PA-C       Physical Exam:    General:   HEENT/NECK:  PERRLA  No jugular venous distention  Cardiac:Regular rate and rhythm, No murmurs rubs or gallops  Pulmonary:Breath sounds clear bilaterally  Abdomen:  Non-tender, Non-distended  Positive bowel sounds  Lower extremities: Extremities warm/dry  PT/DP pulses 2+ bilaterally  Femoral pulses 2+, no bruits  Trace edema B/L  Incisions: Inguinal puncture site is clean, dry, and intact  Neuro: Alert and oriented X 3  Sensation is grossly intact  No focal deficits  Skin: Warm/Dry, without rashes or lesions  Lab Results:       Results from last 7 days  Lab Units 06/20/18  1200   WBC Thousand/uL 5 34   HEMOGLOBIN g/dL 7 9*   HEMATOCRIT % 25 8*   PLATELETS Thousands/uL 221           Imaging Studies:     Transthoracic Echocardiogram:   Aortic valve bioprosthesis well seated without significant regurgitation or PVL    EKG: pending    I have personally reviewed pertinent reports  TAVR evaluation Assessment:     Bin Jones 122: I    Assessment: Aortic stenosis, Non-Rheumatic  S/P transfemoral transcatheter aortic valve replacement;    Plan:     John Dumas is making good progress in her recover following transfemoral transcatheter aortic valve replacement  She is at NYHA functional class I  Groin incisions are well healed  Weight and VS are stable  Echo demonstrates TAVR bioprosthesis well seated with normal function l  I reviewed her medications  She will continue coumadin for her mechanical mitral valve, Plavix  and aspirin for prior coronary PCI  I will start her on Iron and Vit C for her chronic anemia  I have discussed the benefits of participating in cardiac rehabilitation and have encouraged her to to do so  John Dumas may resume driving and all normal activities      John Dumas has already been evaluated by her primary care physician and her cardiologist for ongoing medical care  Arrangements will be made for one year follow-up in our office with repeat echocardiogram, ECG, CBC & BMP  I have advised them to notify her PCP with any new concerns that may arise in the interim and to maintain routine follow up with her cardiologist  Colonel Klein was comfortable with our recommendations and her questions were answered to her satisfaction  SHANE Lewis  6/201/8  1:05 PM  Attestation signed by Rachna Candelaria DO at 6/20/2018  2:01 PM:  The patient was seen and examined, and I agree with the midlevel's history, physical exam, assessment and plan with the following additions:    Mrs Shmuel Pablo is recovering well from her TAVR  She has no complaints, and is feeling much improved  She is cleared enter into outpatient cardiac rehab  Her echocardiogram was reviewed with her demonstrating a well-functioning transcatheter valve with no significant paravalvular leak  We have also started her on iron today for a low hemoglobin  She will follow up with her PCP regarding her anemia

## 2018-06-29 ENCOUNTER — PATIENT OUTREACH (OUTPATIENT)
Dept: OTHER | Facility: HOSPITAL | Age: 73
End: 2018-06-29

## 2018-07-06 ENCOUNTER — PATIENT OUTREACH (OUTPATIENT)
Dept: OTHER | Facility: HOSPITAL | Age: 73
End: 2018-07-06

## 2018-07-11 ENCOUNTER — PATIENT OUTREACH (OUTPATIENT)
Dept: OTHER | Facility: HOSPITAL | Age: 73
End: 2018-07-11

## 2018-09-20 DIAGNOSIS — D63.8 ANEMIA IN OTHER CHRONIC DISEASES CLASSIFIED ELSEWHERE: ICD-10-CM

## 2018-09-21 RX ORDER — FERROUS SULFATE 325(65) MG
TABLET ORAL
Qty: 90 TABLET | Refills: 0 | OUTPATIENT
Start: 2018-09-21

## 2019-08-14 ENCOUNTER — TELEPHONE (OUTPATIENT)
Dept: NEPHROLOGY | Facility: CLINIC | Age: 74
End: 2019-08-14

## 2019-08-16 ENCOUNTER — CONSULT (OUTPATIENT)
Dept: NEPHROLOGY | Facility: CLINIC | Age: 74
End: 2019-08-16
Payer: MEDICARE

## 2019-08-16 VITALS
DIASTOLIC BLOOD PRESSURE: 60 MMHG | HEART RATE: 55 BPM | TEMPERATURE: 97.1 F | BODY MASS INDEX: 21.57 KG/M2 | SYSTOLIC BLOOD PRESSURE: 118 MMHG | HEIGHT: 59 IN | WEIGHT: 107 LBS | RESPIRATION RATE: 16 BRPM

## 2019-08-16 DIAGNOSIS — N18.30 CKD (CHRONIC KIDNEY DISEASE) STAGE 3, GFR 30-59 ML/MIN (HCC): Primary | ICD-10-CM

## 2019-08-16 PROCEDURE — 99204 OFFICE O/P NEW MOD 45 MIN: CPT | Performed by: INTERNAL MEDICINE

## 2019-08-16 NOTE — PROGRESS NOTES
NEPHROLOGY OFFICE CONSULT  Tammi Carrera 72 y o  female MRN: [id number]    Encounter: [id number] DATE: 8/16/2019    REASON FOR VISIT: Tammi Carrera is a 72 y  o female who was referred by Rangel Ware MD for evaluation  Rosella Goldmann HPI:    This is a 26-year-old female with multiple medical problems including hypertension, diet-controlled diabetes mellitus type 2, mitral valve replacement, transfemoral aortic valve placement in the year 2018, CAD status post CABG followed by coronary stents, atrial fibrillation, renal artery stenosis chronic diarrhea, Hodgkin's lymphoma in remission who is referred to Renal Clinic by her PCP secondary to slightly elevated renal parameters  Patient has evidence of chronic kidney disease stage 3 as early as 2017  She had episodes of Amaury in the year 2018 when serum creatinine sai to 1 6  Lately, renal function when checked revealed serum creatinine hovering in the range between 1 2-1 4  Today in the office patient feels well  Upset about having chronic diarrhea and having to take Imodium on around the clock basis  Denies having low or high blood pressure            PAST MEDICAL HISTORY:  Past Medical History:   Diagnosis Date    Aortic valvar stenosis     CAD (coronary artery disease) of bypass graft     Carotid stenosis, asymptomatic, bilateral 04/2018    50-69% b/l    Chronic CHF (congestive heart failure) ([facility])     Compression deformity of vertebra     L2    DM2 (diabetes mellitus, type 2) ([facility])     oral controlled    H/O Hodgkin's lymphoma     History of cervical cancer     History of ischemic cardiomyopathy     History of uterine cancer     HLD (hyperlipidemia)     HTN (hypertension)     Iliac artery stenosis, right ([facility])     common, 50%    LBBB (left bundle branch block)     Mitral regurgitation     PAD (peripheral artery disease) ([facility])     Renal artery stenosis ([facility])     s/p renal artery stent    SSS (sick sinus syndrome) ([facility])     s/p ppm       PAST SURGICAL HISTORY:  Past Surgical History:   Procedure Laterality Date    APPENDECTOMY      CARDIAC PACEMAKER PLACEMENT      CARDIAC SURGERY  09/2007    MV Repair, CABG x 2 by Dr Amrit Chisholm @ [address] [facility]  01/2008    Redo Sternotomy, MVR #25mm St  [employer]    CHOLECYSTECTOMY      COLECTOMY      DE ECHO TRANSESOPHAG R-T 2D W/PRB IMG ACQUISJ I&R N/A 5/22/2018    Procedure: TRANSESOPHAGEAL ECHOCARDIOGRAM (LORA); Surgeon: Senait Cedeno DO;  Location: [facility];  Service: Cardiac Surgery    DE REPLACE AORTIC VALVE OPENFEMORAL ARTERY APPROACH N/A 5/22/2018    Procedure: REPLACEMENT AORTIC VALVE TRANSCATHETER (TAVR) TRANSFEMORAL W/ 23 MM HUGHES KOKO S3 VALVE (ACCESS ON LEFT);   Surgeon: Senait eCdeno DO;  Location: [facility];  Service: Cardiac Surgery    RENAL ARTERY STENT      TONSILLECTOMY         SOCIAL HISTORY:  Social History     Substance and Sexual Activity   Alcohol Use No     Social History     Substance and Sexual Activity   Drug Use No     Social History     Tobacco Use   Smoking Status Former Smoker    Years: 15 00    Types: Cigarettes    Last attempt to quit: 2016    Years since quitting: 3 6   Smokeless Tobacco Never Used       FAMILY HISTORY:  Family History   Problem Relation Age of Onset    Coronary artery disease Father     Stomach cancer Father     Cancer Father     Cervical cancer Mother     Breast cancer Maternal Aunt        ALLERGY:  Allergies   Allergen Reactions    Contrast [Iodinated Diagnostic Agents] Shortness Of Breath and Throat Swelling    Ranolazine Shortness Of Breath and Vomiting     ranexa    Codeine GI Intolerance     vomit    Penicillin G Hives    Penicillins Throat Swelling    Sulfa Antibiotics GI Intolerance and Throat Swelling       MEDICATIONS:    Current Outpatient Medications:     acetaminophen (TYLENOL) 325 mg tablet, Take 1-2 tablets Q4-6 hours PRN pain , Disp: 30 tablet, Rfl: 0    ascorbic acid (VITAMIN C) 500 mg tablet, Take 1 tablet (500 mg total) by mouth daily, Disp: 90 tablet, Rfl: 0    aspirin 81 mg chewable tablet, Chew 1 tablet (81 mg total) daily, Disp: 30 tablet, Rfl: 2    ASPIRIN 81 PO, Take 81 mg by mouth daily, Disp: , Rfl:     carvedilol (COREG) 3 125 mg tablet, Take 1 tablet (3 125 mg total) by mouth 2 (two) times a day with meals, Disp: 60 tablet, Rfl: 2    cholecalciferol (VITAMIN D3) 1,000 units tablet, Take 2 tablets (2,000 Units total) by mouth daily, Disp: 30 tablet, Rfl: 1    clopidogrel (PLAVIX) 75 mg tablet, Take 1 tablet (75 mg total) by mouth daily, Disp: 30 tablet, Rfl: 2    diphenoxylate-atropine (LOMOTIL) 2 5-0 025 mg per tablet, Take 1 tablet by mouth 4 (four) times a day as needed, Disp: , Rfl:     ferrous sulfate 324 (65 Fe) mg, Take 1 tablet (324 mg total) by mouth 2 (two) times a day before meals, Disp: 90 tablet, Rfl: 0    furosemide (LASIX) 40 mg tablet, Take 1 tablet (40 mg total) by mouth daily, Disp: 30 tablet, Rfl: 2    loperamide (IMODIUM) 2 mg capsule, Take 2 capsules (4 mg total) by mouth 4 (four) times a day as needed for diarrhea, Disp: 30 capsule, Rfl: 0    pantoprazole (PROTONIX) 40 mg tablet, Take 1 tablet (40 mg total) by mouth daily for 30 days, Disp: 30 tablet, Rfl: 0    potassium chloride (K-DUR,KLOR-CON) 10 mEq tablet, Take 1 tablet (10 mEq total) by mouth daily, Disp: 30 tablet, Rfl: 2    pravastatin (PRAVACHOL) 20 mg tablet, Take 2 tablets (40 mg total) by mouth daily at bedtime, Disp: 60 tablet, Rfl: 2    traMADol (ULTRAM) 50 mg tablet, Take 50 mg by mouth every 6 (six) hours as needed for moderate pain, Disp: , Rfl:     warfarin (COUMADIN) 1 mg tablet, Take 2 tablets (2mg) daily at bedtime unless otherwise directed  (Patient taking differently: Take 2 tablets (2mg) daily at bedtime unless otherwise directed ), Disp: 60 tablet, Rfl: 2    REVIEW OF SYSTEMS:    Review of Systems   Constitutional: Negative  HENT: Negative  Eyes: Negative  Respiratory: Negative  Cardiovascular: Negative  Gastrointestinal: Positive for diarrhea  Endocrine: Negative  Genitourinary: Negative  Musculoskeletal: Negative  Skin: Negative  Allergic/Immunologic: Negative  Neurological: Negative  Hematological: Negative  All other systems reviewed and are negative  PHYSICAL EXAM:  Vitals:    08/16/19 0830   BP: 118/60   BP Location: Right arm   Patient Position: Sitting   Cuff Size: Adult   Pulse: 55   Resp: 16   Temp: (!) 97 1 °F (36 2 °C)   TempSrc: Tympanic   Weight: 48 5 kg (107 lb)   Height: 4' 11" (1 499 m)     Body mass index is 21 61 kg/m²  Physical Exam   Constitutional: She is oriented to person, place, and time  She appears well-developed and well-nourished  HENT:   Head: Normocephalic and atraumatic  Eyes: Pupils are equal, round, and reactive to light  Neck: Neck supple  Cardiovascular: Normal rate and regular rhythm  Murmur heard  Pulmonary/Chest: Effort normal    Abdominal: Soft  Bowel sounds are normal    Musculoskeletal: Normal range of motion  Neurological: She is alert and oriented to person, place, and time  Skin: Skin is warm  Psychiatric: She has a normal mood and affect  LAB RESULTS:    Reviewed        ASSESSMENT and PLAN:  Ramses Whaley was seen today for consult  Diagnoses and all orders for this visit:    CKD (chronic kidney disease) stage 3, GFR 30-59 ml/min ([facility])  -     Albumin; Standing  -     Calcium; Standing  -     CBC and differential; Standing  -     Comprehensive metabolic panel; Standing  -     Phosphorus; Standing  -     Protein / creatinine ratio, urine; Standing  -     PTH, intact; Standing  -     Urinalysis with microscopic; Standing  -     Vitamin D 25 hydroxy; Standing  -     US kidney and bladder;  Future  -     Albumin  -     Calcium  -     CBC and differential  -     Comprehensive metabolic panel  -     Phosphorus  -     Protein / creatinine ratio, urine  -     PTH, intact  -     Urinalysis with microscopic  -     Vitamin D 25 hydroxy      82-year-old female with past medical history of hypertension, diabetes mellitus type 2 diet controlled, Hodgkin's lymphoma in remission, chronic diarrhea, chronic kidney disease stage 3, CAD status post CABG followed by stents, ostial renal artery stenosis who presents to the renal office for management of CKD  1  Chronic kidney disease stage 3:  Etiology likely renovascular disease as evident by image supported ostial renal artery stenosis and atherosclerotic disease in the distal aorta plus cardiorenal syndrome type 2 the setting of ischemic cardiomyopathy with low ejection fraction plus age plus diabetic glomerulosclerosis  Recent labs obtained revealed serum creatinine of 1 2 with estimated GFR of 47 placing her in stage 3 chronic kidney disease  Avoidance of NSAIDs recommended  Will obtain a renal ultrasound and bladder scan to evaluate patient's renal parenchyma  2  Hypertension:  Current blood pressure is stable and on target  3  ischemic cardiomyopathy:  Currently maintained on  Lasix 40 mg once daily  Patient appears to be euvolemic  4  Anemia of chronic disease:  Patient has history of anemia secondary to blood loss  Will obtain CBC with diff  Target hemoglobin for patient with CKD is 10-11 5     5  Proteinuria:  No recent urinalysis noted  Will obtain urine protein creatinine ratio as well  6  Bone mineral disorder:  Will evaluate patient's 25 hydroxy vitamin-D status  She is noted to be on cholecalciferol on a daily basis  Evaluate for secondary hyperparathyroidism by checking parathyroid hormone intact, calcium phosphorus levels  7  Renal artery stenosis:  Noted to have ostial renal artery stenosis as per CT angiogram last year  No evidence of progressive renal dysfunction or malignant hypertension to warrant intervention at this time  8  Chronic diarrhea:  Per patient occurred post subtotal colectomy    Recommend to avoid Imodium on RTC basis  Requested patient to seek GI consultation  Georgette Ormond

## 2019-08-27 ENCOUNTER — TELEPHONE (OUTPATIENT)
Dept: NEPHROLOGY | Facility: CLINIC | Age: 74
End: 2019-08-27

## 2019-08-27 DIAGNOSIS — I35.0 SEVERE AORTIC STENOSIS: Primary | ICD-10-CM

## 2019-08-27 NOTE — TELEPHONE ENCOUNTER
Patient of Dr Markham Mess called stating that she needs a note for her job ([employer]: To whom it may concern) stating that she can not work late because of her having CKD 3 and it is effecting her health  Patient said that she did speak to her PCP, but the PCP said that the note needs to come from her Nephrologist  If a note is done please send the note to the patient  If any questions please call the patient at [phone]   Nadine Bridges,

## 2019-08-28 ENCOUNTER — TELEPHONE (OUTPATIENT)
Dept: OTHER | Facility: HOSPITAL | Age: 74
End: 2019-08-28
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
## 2019-11-18 ENCOUNTER — HOSPITAL ENCOUNTER (OUTPATIENT)
Dept: ULTRASOUND IMAGING | Facility: HOSPITAL | Age: 74
Discharge: HOME | End: 2019-11-18
Payer: MEDICARE

## 2019-11-18 DIAGNOSIS — N18.30 CKD (CHRONIC KIDNEY DISEASE) STAGE 3, GFR 30-59 ML/MIN (HCC): ICD-10-CM

## 2019-11-18 PROCEDURE — 76770 US EXAM ABDO BACK WALL COMP: CPT

## 2019-12-18 LAB
CREAT ?TM UR-SCNC: 77.5 UMOL/L
EXT PROTEIN URINE: 11.8
HBA1C MFR BLD HPLC: 6.2 %
PROT/CREAT UR: 0.15 MG/G{CREAT}

## 2019-12-19 ENCOUNTER — TELEPHONE (OUTPATIENT)
Dept: NEPHROLOGY | Facility: CLINIC | Age: 74
End: 2019-12-19

## 2019-12-19 ENCOUNTER — OFFICE VISIT (OUTPATIENT)
Dept: NEPHROLOGY | Facility: CLINIC | Age: 74
End: 2019-12-19
Payer: MEDICARE

## 2019-12-19 VITALS
SYSTOLIC BLOOD PRESSURE: 120 MMHG | TEMPERATURE: 97.6 F | RESPIRATION RATE: 16 BRPM | WEIGHT: 112.6 LBS | DIASTOLIC BLOOD PRESSURE: 58 MMHG | BODY MASS INDEX: 22.1 KG/M2 | HEIGHT: 60 IN | HEART RATE: 60 BPM

## 2019-12-19 DIAGNOSIS — N30.01 ACUTE CYSTITIS WITH HEMATURIA: Primary | ICD-10-CM

## 2019-12-19 LAB
CHOLEST SERPL-MCNC: 173 MG/DL (ref 50–200)
CHOLEST/HDLC SERPL: 2.84 {RATIO}
CREAT ?TM UR-SCNC: 77.5 UMOL/L
EXT BLOOD, UA: NORMAL
EXT DIFF-ABS BASOPHILS: 0.1
EXT DIFF-ABS EOSINOPHILS: 0.1
EXT DIFF-ABS LYMPHOCYTES: 0.8
EXT DIFF-ABS MONOCYTES: 0.5
EXT DIFF-ABS NEUTROPHILS: 4.4
EXT GLUCOSE BLD: 111
EXT GLUCOSE, UA: NEGATIVE
EXT KETONES: NEGATIVE
EXT NITRITE, UA: NEGATIVE
EXT PH, UA: 6
EXT PROTEIN URINE: 11.8
EXT PROTEIN, UA: NEGATIVE
EXT SPECIFIC GRAVITY, UA: 1.01
EXTERNAL ALBUMIN: 3.8
EXTERNAL ALK PHOS: 87
EXTERNAL ALT: 22
EXTERNAL AST: 22
EXTERNAL BACTERIA (UA): NORMAL
EXTERNAL BICARBONATE: 28
EXTERNAL BUN: 24
EXTERNAL CALCIUM: 9.3
EXTERNAL CASTS (UA): NORMAL
EXTERNAL CHLORIDE: 108
EXTERNAL CREATININE: 1.2
EXTERNAL EGFR: 45
EXTERNAL HEMATOCRIT: 41 %
EXTERNAL HEMOGLOBIN: 13.5 G/DL
EXTERNAL MCV: 92
EXTERNAL PLATELET COUNT: 106 K/ΜL
EXTERNAL POTASSIUM: 4.1
EXTERNAL PTH: 85.8
EXTERNAL RBC (UA): NORMAL
EXTERNAL RBC: 4.43
EXTERNAL RDW: 14.2
EXTERNAL SODIUM: 139
EXTERNAL T.BILIRUBIN: 0.5
EXTERNAL TOTAL PROTEIN: 7.3
EXTERNAL WBC (UA): NORMAL
EXTERNAL WBC: 5.8
HBA1C MFR BLD HPLC: 6.2 %
HDLC SERPL-MCNC: 61 MG/DL (ref 40–?)
LDLC SERPL CALC-MCNC: 88 MG/DL (ref 0–100)
NONHDLC SERPL-MCNC: 112 MG/DL
PROT/CREAT UR: 0.15 MG/G{CREAT}
TRIGL SERPL-MCNC: 120 MG/DL (ref ?–150)
WBC # BLD EST: 500 10*3/UL

## 2019-12-19 PROCEDURE — 99214 OFFICE O/P EST MOD 30 MIN: CPT | Performed by: INTERNAL MEDICINE

## 2019-12-19 RX ORDER — CIPROFLOXACIN 250 MG/1
250 TABLET, FILM COATED ORAL EVERY 12 HOURS SCHEDULED
Qty: 6 TABLET | Refills: 0 | Status: SHIPPED | OUTPATIENT
Start: 2019-12-19 | End: 2019-12-22

## 2019-12-19 NOTE — PROGRESS NOTES
NEPHROLOGY PROGRESS NOTE    Patient: Danielle Sheets               Sex: female          DOA: No admission date for patient encounter  YOB: 1945        Age:  69 y o         LOS: [unfilled]  12/19/2019        BACKGROUND     79-year-old female with past medical history of hypertension, chronic kidney disease stage 3, ischemic cardiomyopathy, ostial renal artery stenosis, CAD status post CABG followed by coronary artery stents, Hodgkin's lymphoma in remission who has been following up in the renal clinic since August 2019  SUBJECTIVE     Patient presents after 4 months  Offers no major complaints  REVIEW OF SYSTEMS     Review of Systems   Constitutional: Positive for fatigue  HENT: Negative  Eyes: Negative  Respiratory: Negative  Cardiovascular: Negative  Gastrointestinal: Negative  Endocrine: Negative  Genitourinary: Negative  Musculoskeletal: Negative  Skin: Negative  Allergic/Immunologic: Negative  Neurological: Negative  Hematological: Negative  All other systems reviewed and are negative  OBJECTIVE     Current Weight: Weight - Scale: 51 1 kg (112 lb 9 6 oz)  Vitals:    12/19/19 0900   BP: 120/58   Pulse: 60   Resp: 16   Temp: 97 6 °F (36 4 °C)     Body mass index is 21 99 kg/m²    [unfilled]    CURRENT MEDICATIONS       Current Outpatient Medications:     ascorbic acid (VITAMIN C) 500 mg tablet, Take 1 tablet (500 mg total) by mouth daily, Disp: 90 tablet, Rfl: 0    aspirin 81 mg chewable tablet, Chew 1 tablet (81 mg total) daily, Disp: 30 tablet, Rfl: 2    carvedilol (COREG) 3 125 mg tablet, Take 1 tablet (3 125 mg total) by mouth 2 (two) times a day with meals, Disp: 60 tablet, Rfl: 2    cholecalciferol (VITAMIN D3) 1,000 units tablet, Take 2 tablets (2,000 Units total) by mouth daily, Disp: 30 tablet, Rfl: 1    ferrous sulfate 324 (65 Fe) mg, Take 1 tablet (324 mg total) by mouth 2 (two) times a day before meals, Disp: 90 tablet, Rfl: 0   furosemide (LASIX) 40 mg tablet, Take 1 tablet (40 mg total) by mouth daily, Disp: 30 tablet, Rfl: 2    loperamide (IMODIUM) 2 mg capsule, Take 2 capsules (4 mg total) by mouth 4 (four) times a day as needed for diarrhea, Disp: 30 capsule, Rfl: 0    pantoprazole (PROTONIX) 40 mg tablet, Take 1 tablet (40 mg total) by mouth daily for 30 days, Disp: 30 tablet, Rfl: 0    pravastatin (PRAVACHOL) 20 mg tablet, Take 2 tablets (40 mg total) by mouth daily at bedtime, Disp: 60 tablet, Rfl: 2    sacubitril-valsartan (ENTRESTO) 24-26 MG TABS, Take 1 tablet by mouth 2 (two) times a day, Disp: , Rfl:     traMADol (ULTRAM) 50 mg tablet, Take 50 mg by mouth every 6 (six) hours as needed for moderate pain, Disp: , Rfl:     warfarin (COUMADIN) 1 mg tablet, Take 2 tablets (2mg) daily at bedtime unless otherwise directed  (Patient taking differently: Take 2 tablets (2mg) daily at bedtime unless otherwise directed ), Disp: 60 tablet, Rfl: 2    acetaminophen (TYLENOL) 325 mg tablet, Take 1-2 tablets Q4-6 hours PRN pain  (Patient not taking: Reported on 12/19/2019), Disp: 30 tablet, Rfl: 0    ASPIRIN 81 PO, Take 81 mg by mouth daily, Disp: , Rfl:     ciprofloxacin (CIPRO) 250 mg tablet, Take 1 tablet (250 mg total) by mouth every 12 (twelve) hours for 3 days, Disp: 6 tablet, Rfl: 0    clopidogrel (PLAVIX) 75 mg tablet, Take 1 tablet (75 mg total) by mouth daily (Patient not taking: Reported on 12/19/2019), Disp: 30 tablet, Rfl: 2    diphenoxylate-atropine (LOMOTIL) 2 5-0 025 mg per tablet, Take 1 tablet by mouth 4 (four) times a day as needed, Disp: , Rfl:     potassium chloride (K-DUR,KLOR-CON) 10 mEq tablet, Take 1 tablet (10 mEq total) by mouth daily (Patient not taking: Reported on 12/19/2019), Disp: 30 tablet, Rfl: 2      PHYSICAL EXAMINATION     Physical Exam   Constitutional: She is oriented to person, place, and time  She appears well-developed and well-nourished  HENT:   Head: Normocephalic and atraumatic     Eyes: Pupils are equal, round, and reactive to light  Neck: Neck supple  Cardiovascular: Normal rate and regular rhythm  Murmur heard  Pulmonary/Chest: Effort normal    Abdominal: Soft  Bowel sounds are normal    Musculoskeletal: Normal range of motion  Neurological: She is alert and oriented to person, place, and time  Skin: Skin is warm  Psychiatric: She has a normal mood and affect  LAB RESULTS     Results from last 7 days   Lab Units 12/18/19   WBC  5 8   HEMOGLOBIN g/dL 13 5   HEMATOCRIT % 41   PLATELETS K/µL 517   POTASSIUM  4 1   CHLORIDE  108   BUN  24   CREATININE  1 20   EGFR  45   CALCIUM  9 3           RADIOLOGY RESULTS      Results for orders placed during the hospital encounter of 05/19/18   XR chest portable    Narrative CHEST     INDICATION:   Post Open Heart Surgey  COMPARISON:  Chest radiographs May 22, 2018    EXAM PERFORMED/VIEWS:  XR CHEST PORTABLE      FINDINGS:    The heart is enlarged  Atherosclerotic changes in the aorta  Median sternotomy  Valve replacements  Pacing device  Lung volumes diminished  Surgical clips right lung apex  Endotracheal tube and orogastric tube have been removed  Left internal jugular cordis  This appears kinked at the insertion site as well as at the level of the clavicle  Osseous structures appear within normal limits for patient age  Calcified bilateral breast implants  Impression 1  Diminished inspiration  No acute cardiopulmonary disease  2   Abnormal configuration of the left internal jugular cordis  The study was marked in EPIC for significant notification  Workstation performed: [id number]           ASSESSMENT/PLAN     44-year-old female with past medical history of hypertension, diet-controlled diabetes mellitus, ischemic cardiomyopathy, CAD status post CABG, coronary artery stents, chronic kidney disease stage 3, renal artery stenosis, Hodgkin's lymphoma in remission who presents for follow-up      1  Chronic kidney disease stage 3:  Etiology likely appears to be renovascular disease, age, hypertensive nephrosclerosis and diabetic glomerulosclerosis  Patient's baseline serum creatinine hovers between 1 2-1 4  Recent labs obtained revealed serum creatinine 1 2 and baseline  Renal ultrasound revealed small size kidneys without any increased echogenicity  Patient was ruled out for significant postvoid residual urine retention  2  Hypertension:  Patient blood pressure is on target  3  CHF with systolic dysfunction:  Compensated  Patient appears euvolemic at present time  Will continue with Lasix and Entresto  4  Renal artery stenosis:  Longstanding history of the same  No signs of worsening renal dysfunction or flash pulmonary edema or requiring 3 number blood pressure medication to control her hypertension and therefore will continue to watch and wait  5  Pyuria:  Urinalysis obtained revealed microscopic hematuria as well as pyuria  Few bacteria noted as well  Given patient is asymptomatic, will treat with 3 days of ciprofloxacin  6  Chronic diarrhea:  Patient longstanding history of diarrhea and had been self treating herself with Imodium as needed  7  Anemia due to CKD:  Patient hemoglobin is 13 and on target for her level of CKD  8  Secondary hyperparathyroidism of renal origin:  Noted PTH intact to be on target  Twenty-five hydroxy vitamin-D not obtained  Will obtain with next blood work                Evaline Gilford, MD  Nephrology  12/19/2019
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
## 2020-06-05 ENCOUNTER — TELEPHONE (OUTPATIENT)
Dept: NEPHROLOGY | Facility: CLINIC | Age: 75
End: 2020-06-05

## 2020-06-15 ENCOUNTER — TELEPHONE (OUTPATIENT)
Dept: NEPHROLOGY | Facility: CLINIC | Age: 75
End: 2020-06-15

## 2020-06-15 LAB
CREAT ?TM UR-SCNC: 71.8 UMOL/L
EXT PROTEIN URINE: 8.7
PROT/CREAT UR: 0.12 MG/G{CREAT}

## 2020-06-18 ENCOUNTER — DOCUMENTATION (OUTPATIENT)
Dept: NEPHROLOGY | Facility: CLINIC | Age: 75
End: 2020-06-18

## 2020-06-18 LAB
CREAT ?TM UR-SCNC: 71.8 UMOL/L
EXT BLOOD, UA: NORMAL
EXT DIFF-ABS BASOPHILS: 0.1
EXT DIFF-ABS EOSINOPHILS: 0.1
EXT DIFF-ABS LYMPHOCYTES: 0.9
EXT DIFF-ABS MONOCYTES: 0.6
EXT DIFF-ABS NEUTROPHILS: 4.4
EXT GLUCOSE BLD: 127
EXT GLUCOSE, UA: NORMAL
EXT KETONES: NORMAL
EXT NITRITE, UA: NORMAL
EXT PH, UA: 6
EXT PROTEIN URINE: 8.7
EXT PROTEIN, UA: NORMAL
EXT SPECIFIC GRAVITY, UA: 1.01
EXTERNAL ALBUMIN: 4
EXTERNAL ALK PHOS: 95
EXTERNAL ALT: 20
EXTERNAL ANION GAP: 8
EXTERNAL AST: 22
EXTERNAL BACTERIA (UA): NORMAL
EXTERNAL BICARBONATE: 29
EXTERNAL BUN: 22
EXTERNAL CALCIUM: 9
EXTERNAL CHLORIDE: 103
EXTERNAL CREATININE: 1.29
EXTERNAL EGFR: 41
EXTERNAL HEMATOCRIT: 37 %
EXTERNAL HEMOGLOBIN: 12.4 G/DL
EXTERNAL MCV: 92
EXTERNAL PHOSPHORUS: 3.3
EXTERNAL PLATELET COUNT: 173 K/ΜL
EXTERNAL POTASSIUM: 4
EXTERNAL PTH: 171.2
EXTERNAL RBC (UA): NORMAL
EXTERNAL RBC: 4.05
EXTERNAL RDW: 14.2
EXTERNAL SODIUM: 140
EXTERNAL T.BILIRUBIN: 0.4
EXTERNAL TOTAL PROTEIN: 7.7
EXTERNAL VITAMIN D,25: 30
EXTERNAL WBC (UA): NORMAL
EXTERNAL WBC: 6.1
PROT/CREAT UR: 0.12 MG/G{CREAT}
WBC # BLD EST: 500 10*3/UL

## 2020-06-19 ENCOUNTER — TELEPHONE (OUTPATIENT)
Dept: NEPHROLOGY | Facility: CLINIC | Age: 75
End: 2020-06-19

## 2020-06-19 ENCOUNTER — TELEMEDICINE (OUTPATIENT)
Dept: NEPHROLOGY | Facility: CLINIC | Age: 75
End: 2020-06-19
Payer: MEDICARE

## 2020-06-19 VITALS — HEIGHT: 59 IN | RESPIRATION RATE: 16 BRPM | BODY MASS INDEX: 21.57 KG/M2 | WEIGHT: 107 LBS

## 2020-06-19 DIAGNOSIS — N18.30 CKD (CHRONIC KIDNEY DISEASE) STAGE 3, GFR 30-59 ML/MIN (HCC): Primary | ICD-10-CM

## 2020-06-19 PROCEDURE — 99214 OFFICE O/P EST MOD 30 MIN: CPT | Performed by: INTERNAL MEDICINE

## 2020-08-18 ENCOUNTER — TELEPHONE (OUTPATIENT)
Dept: NEPHROLOGY | Facility: CLINIC | Age: 75
End: 2020-08-18

## 2020-08-18 NOTE — TELEPHONE ENCOUNTER
LM for patient to reschedule apt with Dr Rossy Combs on 12/21/2020, provider will not be in the office  A new apt was given and an apt card was mailed to address on file   [address] [facility]

## 2020-11-16 ENCOUNTER — TELEPHONE (OUTPATIENT)
Dept: NEPHROLOGY | Facility: CLINIC | Age: 75
End: 2020-11-16

## 2020-12-07 ENCOUNTER — TELEPHONE (OUTPATIENT)
Dept: NEPHROLOGY | Facility: CLINIC | Age: 75
End: 2020-12-07

## 2020-12-11 ENCOUNTER — LAB (OUTPATIENT)
Dept: LAB | Facility: CLINIC | Age: 75
End: 2020-12-11
Payer: MEDICARE

## 2021-03-08 ENCOUNTER — TELEPHONE (OUTPATIENT)
Dept: NEPHROLOGY | Facility: CLINIC | Age: 76
End: 2021-03-08

## 2021-03-09 ENCOUNTER — TELEPHONE (OUTPATIENT)
Dept: NEPHROLOGY | Facility: CLINIC | Age: 76
End: 2021-03-09

## 2021-03-09 ENCOUNTER — TRANSCRIBE ORDERS (OUTPATIENT)
Dept: LAB | Facility: CLINIC | Age: 76
End: 2021-03-09

## 2021-03-09 ENCOUNTER — APPOINTMENT (OUTPATIENT)
Dept: LAB | Facility: CLINIC | Age: 76
End: 2021-03-09
Payer: COMMERCIAL

## 2021-03-09 DIAGNOSIS — Z95.2 MECHANICAL HEART VALVE PRESENT: ICD-10-CM

## 2021-03-09 DIAGNOSIS — Z95.2 MECHANICAL HEART VALVE PRESENT: Primary | ICD-10-CM

## 2021-03-09 LAB
INR PPP: 2.11 (ref 0.84–1.19)
PROTHROMBIN TIME: 23.6 SECONDS (ref 11.6–14.5)

## 2021-03-09 PROCEDURE — 36415 COLL VENOUS BLD VENIPUNCTURE: CPT

## 2021-03-09 PROCEDURE — 85610 PROTHROMBIN TIME: CPT

## 2021-03-09 NOTE — TELEPHONE ENCOUNTER
LM that her appt is scheduled for 03/15/21 at 2:45  Asked to call at [phone] if any questions or concerns

## 2021-03-12 ENCOUNTER — TELEPHONE (OUTPATIENT)
Dept: NEPHROLOGY | Facility: CLINIC | Age: 76
End: 2021-03-12

## 2021-03-12 NOTE — TELEPHONE ENCOUNTER
I called and left a message for patient to return our call about confirming appointment   Gabriela Kenney,

## 2021-03-15 ENCOUNTER — OFFICE VISIT (OUTPATIENT)
Dept: NEPHROLOGY | Facility: CLINIC | Age: 76
End: 2021-03-15
Payer: COMMERCIAL

## 2021-03-15 VITALS
HEIGHT: 60 IN | DIASTOLIC BLOOD PRESSURE: 70 MMHG | BODY MASS INDEX: 25.56 KG/M2 | TEMPERATURE: 96.6 F | HEART RATE: 60 BPM | RESPIRATION RATE: 16 BRPM | WEIGHT: 130.2 LBS | SYSTOLIC BLOOD PRESSURE: 114 MMHG

## 2021-03-15 DIAGNOSIS — N18.31 STAGE 3A CHRONIC KIDNEY DISEASE (HCC): Primary | ICD-10-CM

## 2021-03-15 PROCEDURE — 99215 OFFICE O/P EST HI 40 MIN: CPT | Performed by: INTERNAL MEDICINE

## 2021-03-15 RX ORDER — ALBUTEROL SULFATE 2.5 MG/3ML
2.5 SOLUTION RESPIRATORY (INHALATION) EVERY 6 HOURS PRN
COMMUNITY
Start: 2021-03-02

## 2021-03-15 RX ORDER — ALBUTEROL SULFATE 90 UG/1
2 AEROSOL, METERED RESPIRATORY (INHALATION) EVERY 6 HOURS PRN
COMMUNITY

## 2021-03-15 NOTE — PROGRESS NOTES
NEPHROLOGY PROGRESS NOTE    Patient: Marylee Rick               Sex: female          DOA: No admission date for patient encounter  YOB: 1945        Age:  70 y o         LOS: [unfilled]  3/15/2021        BACKGROUND     81-year-old female with past medical history of hypertension, chronic kidney disease stage 3, ischemic cardiomyopathy, ostial renal artery stenosis, CAD status post CABG followed by coronary artery stents, Hodgkin's lymphoma in remission who has been following up in the renal clinic since August 2019  SUBJECTIVE     Patient presents after 6 months  Last visit was via tele medicine  REVIEW OF SYSTEMS     Review of Systems   Constitutional: Negative  HENT: Negative  Eyes: Negative  Respiratory: Negative  Cardiovascular: Negative  Gastrointestinal: Negative  Endocrine: Negative  Genitourinary: Negative  Musculoskeletal: Negative  Skin: Negative  Allergic/Immunologic: Negative  Neurological: Negative  Hematological: Negative  All other systems reviewed and are negative  OBJECTIVE     Current Weight: Weight - Scale: 59 1 kg (130 lb 3 2 oz)  Vitals:    03/15/21 1436   BP: 114/70   Pulse: 60   Resp: 16   Temp: (!) 96 6 °F (35 9 °C)     Body mass index is 25 86 kg/m²  CURRENT MEDICATIONS       Current Outpatient Medications:     acetaminophen (TYLENOL) 325 mg tablet, Take 1-2 tablets Q4-6 hours PRN pain   (Patient taking differently: once as needed Take 1-2 tablets Q4-6 hours PRN pain ), Disp: 30 tablet, Rfl: 0    albuterol (2 5 mg/3 mL) 0 083 % nebulizer solution, Inhale 2 5 mg every 6 (six) hours as needed, Disp: , Rfl:     albuterol (PROVENTIL HFA,VENTOLIN HFA) 90 mcg/act inhaler, Inhale 2 puffs every 6 (six) hours as needed, Disp: , Rfl:     ascorbic acid (VITAMIN C) 500 mg tablet, Take 1 tablet (500 mg total) by mouth daily, Disp: 90 tablet, Rfl: 0    aspirin 81 mg chewable tablet, Chew 1 tablet (81 mg total) daily, Disp: 30 tablet, Rfl: 2    carvedilol (COREG) 3 125 mg tablet, Take 1 tablet (3 125 mg total) by mouth 2 (two) times a day with meals (Patient taking differently: Take 3 125 mg by mouth 2 (two) times a day ), Disp: 60 tablet, Rfl: 2    cholecalciferol (VITAMIN D3) 1,000 units tablet, Take 2 tablets (2,000 Units total) by mouth daily (Patient taking differently: Take 6,000 Units by mouth daily ), Disp: 30 tablet, Rfl: 1    ferrous sulfate 324 (65 Fe) mg, Take 1 tablet (324 mg total) by mouth 2 (two) times a day before meals, Disp: 90 tablet, Rfl: 0    furosemide (LASIX) 40 mg tablet, Take 1 tablet (40 mg total) by mouth daily, Disp: 30 tablet, Rfl: 2    loperamide (IMODIUM) 2 mg capsule, Take 2 capsules (4 mg total) by mouth 4 (four) times a day as needed for diarrhea, Disp: 30 capsule, Rfl: 0    pantoprazole (PROTONIX) 40 mg tablet, Take 1 tablet (40 mg total) by mouth daily for 30 days, Disp: 30 tablet, Rfl: 0    potassium chloride (K-DUR,KLOR-CON) 10 mEq tablet, Take 1 tablet (10 mEq total) by mouth daily, Disp: 30 tablet, Rfl: 2    pravastatin (PRAVACHOL) 20 mg tablet, Take 2 tablets (40 mg total) by mouth daily at bedtime, Disp: 60 tablet, Rfl: 2    warfarin (COUMADIN) 1 mg tablet, Take 2 tablets (2mg) daily at bedtime unless otherwise directed   (Patient taking differently: 2 mg daily ), Disp: 60 tablet, Rfl: 2    ASPIRIN 81 PO, Take 81 mg by mouth daily, Disp: , Rfl:     clopidogrel (PLAVIX) 75 mg tablet, Take 1 tablet (75 mg total) by mouth daily (Patient not taking: Reported on 12/19/2019), Disp: 30 tablet, Rfl: 2    diphenoxylate-atropine (LOMOTIL) 2 5-0 025 mg per tablet, Take 1 tablet by mouth 4 (four) times a day as needed, Disp: , Rfl:     sacubitril-valsartan (ENTRESTO) 24-26 MG TABS, Take 1 tablet by mouth daily , Disp: , Rfl:     traMADol (ULTRAM) 50 mg tablet, Take 50 mg by mouth as needed for moderate pain , Disp: , Rfl:       PHYSICAL EXAMINATION     Physical Exam  Constitutional: Appearance: She is well-developed  HENT:      Head: Normocephalic and atraumatic  Eyes:      Pupils: Pupils are equal, round, and reactive to light  Neck:      Musculoskeletal: Neck supple  Cardiovascular:      Rate and Rhythm: Normal rate and regular rhythm  Heart sounds: Normal heart sounds  Pulmonary:      Effort: Pulmonary effort is normal    Abdominal:      General: Bowel sounds are normal       Palpations: Abdomen is soft  Musculoskeletal: Normal range of motion  Skin:     General: Skin is warm  Neurological:      Mental Status: She is alert and oriented to person, place, and time  LAB RESULTS     Results from last 7 days   Lab Units 03/09/21  1359   WBC Thousand/uL 6 39   HEMOGLOBIN g/dL 13 1   HEMATOCRIT % 41 6   PLATELETS Thousands/uL 178   POTASSIUM mmol/L 3 7   CHLORIDE mmol/L 104   CO2 mmol/L 30   BUN mg/dL 17   CREATININE mg/dL 1 09   EGFR ml/min/1 73sq m 50   CALCIUM mg/dL 9 2   PHOSPHORUS mg/dL 3 2           RADIOLOGY RESULTS      Reviewed      ASSESSMENT/PLAN     68-year-old female with past medical history of hypertension, diet-controlled diabetes mellitus, ischemic cardiomyopathy, CAD status post CABG, coronary artery stents, chronic kidney disease stage 3, renal artery stenosis, Hodgkin's lymphoma in remission who presents for follow-up  1  Chronic kidney disease stage 3a:  Etiology likely appears to be renovascular disease, age, hypertensive nephrosclerosis and diabetic glomerulosclerosis  Patient's baseline serum creatinine hovers between 1 2-1 4  Recent labs obtained revealed serum creatinine 1 0  and below baseline  Renal ultrasound revealed small size kidneys without any increased echogenicity  Patient was ruled out for significant postvoid residual urine retention  2  Hypertension:  Patient blood pressure is on target at 819 mm Hg systolic  3  CHF with systolic dysfunction:  Compensated  Patient appears euvolemic at present time    Will continue with Lasix and Entresto  4  Renal artery stenosis:  Longstanding history of the same  No signs of worsening renal dysfunction or flash pulmonary edema or requiring 3 number blood pressure medication to control her hypertension and therefore will continue to watch and wait  5  Chronic diarrhea:  Patient longstanding history of diarrhea and had been self treating herself with Imodium as needed  7  Anemia due to CKD:  Patient hemoglobin is 13 1 and on target for her level of CKD  8  Secondary hyperparathyroidism of renal origin:  Noted PTH intact to be on target  Twenty-five hydroxy vitamin-D insufficient  Recommended to take two tablets of vitamin D daily                 Tila Hopkins MD  Nephrology  3/15/2021

## 2021-03-15 NOTE — LETTER
March 15, 2021     Vasu Melchor MD  [address] [facility] [address] [facility]    Patient: Katelynn Moon   YOB: 1945   Date of Visit: 3/15/2021       Dear Dr Tammy Montoya: Thank you for referring Kaeashley Moon to me for evaluation  Below are my notes for this consultation  If you have questions, please do not hesitate to call me  I look forward to following your patient along with you  Sincerely,        Selwyn Wilson MD        CC: No Recipients  Selwyn Wilson MD  3/15/2021  3:07 PM  Incomplete  NEPHROLOGY PROGRESS NOTE    Patient: Katelynn Moon               Sex: female          DOA: No admission date for patient encounter  YOB: 1945        Age:  70 y o         LOS: [unfilled]  3/15/2021        BACKGROUND     37-year-old female with past medical history of hypertension, chronic kidney disease stage 3, ischemic cardiomyopathy, ostial renal artery stenosis, CAD status post CABG followed by coronary artery stents, Hodgkin's lymphoma in remission who has been following up in the renal clinic since August 2019  SUBJECTIVE     Patient presents after 6 months  Last visit was via tele medicine  REVIEW OF SYSTEMS     Review of Systems   Constitutional: Negative  HENT: Negative  Eyes: Negative  Respiratory: Negative  Cardiovascular: Negative  Gastrointestinal: Negative  Endocrine: Negative  Genitourinary: Negative  Musculoskeletal: Negative  Skin: Negative  Allergic/Immunologic: Negative  Neurological: Negative  Hematological: Negative  All other systems reviewed and are negative  OBJECTIVE     Current Weight: Weight - Scale: 59 1 kg (130 lb 3 2 oz)  Vitals:    03/15/21 1436   BP: 114/70   Pulse: 60   Resp: 16   Temp: (!) 96 6 °F (35 9 °C)     Body mass index is 25 86 kg/m²  CURRENT MEDICATIONS       Current Outpatient Medications:     acetaminophen (TYLENOL) 325 mg tablet, Take 1-2 tablets Q4-6 hours PRN pain  (Patient taking differently: once as needed Take 1-2 tablets Q4-6 hours PRN pain ), Disp: 30 tablet, Rfl: 0    albuterol (2 5 mg/3 mL) 0 083 % nebulizer solution, Inhale 2 5 mg every 6 (six) hours as needed, Disp: , Rfl:     albuterol (PROVENTIL HFA,VENTOLIN HFA) 90 mcg/act inhaler, Inhale 2 puffs every 6 (six) hours as needed, Disp: , Rfl:     ascorbic acid (VITAMIN C) 500 mg tablet, Take 1 tablet (500 mg total) by mouth daily, Disp: 90 tablet, Rfl: 0    aspirin 81 mg chewable tablet, Chew 1 tablet (81 mg total) daily, Disp: 30 tablet, Rfl: 2    carvedilol (COREG) 3 125 mg tablet, Take 1 tablet (3 125 mg total) by mouth 2 (two) times a day with meals (Patient taking differently: Take 3 125 mg by mouth 2 (two) times a day ), Disp: 60 tablet, Rfl: 2    cholecalciferol (VITAMIN D3) 1,000 units tablet, Take 2 tablets (2,000 Units total) by mouth daily (Patient taking differently: Take 6,000 Units by mouth daily ), Disp: 30 tablet, Rfl: 1    ferrous sulfate 324 (65 Fe) mg, Take 1 tablet (324 mg total) by mouth 2 (two) times a day before meals, Disp: 90 tablet, Rfl: 0    furosemide (LASIX) 40 mg tablet, Take 1 tablet (40 mg total) by mouth daily, Disp: 30 tablet, Rfl: 2    loperamide (IMODIUM) 2 mg capsule, Take 2 capsules (4 mg total) by mouth 4 (four) times a day as needed for diarrhea, Disp: 30 capsule, Rfl: 0    pantoprazole (PROTONIX) 40 mg tablet, Take 1 tablet (40 mg total) by mouth daily for 30 days, Disp: 30 tablet, Rfl: 0    potassium chloride (K-DUR,KLOR-CON) 10 mEq tablet, Take 1 tablet (10 mEq total) by mouth daily, Disp: 30 tablet, Rfl: 2    pravastatin (PRAVACHOL) 20 mg tablet, Take 2 tablets (40 mg total) by mouth daily at bedtime, Disp: 60 tablet, Rfl: 2    warfarin (COUMADIN) 1 mg tablet, Take 2 tablets (2mg) daily at bedtime unless otherwise directed   (Patient taking differently: 2 mg daily ), Disp: 60 tablet, Rfl: 2    ASPIRIN 81 PO, Take 81 mg by mouth daily, Disp: , Rfl:    clopidogrel (PLAVIX) 75 mg tablet, Take 1 tablet (75 mg total) by mouth daily (Patient not taking: Reported on 12/19/2019), Disp: 30 tablet, Rfl: 2    diphenoxylate-atropine (LOMOTIL) 2 5-0 025 mg per tablet, Take 1 tablet by mouth 4 (four) times a day as needed, Disp: , Rfl:     sacubitril-valsartan (ENTRESTO) 24-26 MG TABS, Take 1 tablet by mouth daily , Disp: , Rfl:     traMADol (ULTRAM) 50 mg tablet, Take 50 mg by mouth as needed for moderate pain , Disp: , Rfl:       PHYSICAL EXAMINATION     Physical Exam  Constitutional:       Appearance: She is well-developed  HENT:      Head: Normocephalic and atraumatic  Eyes:      Pupils: Pupils are equal, round, and reactive to light  Neck:      Musculoskeletal: Neck supple  Cardiovascular:      Rate and Rhythm: Normal rate and regular rhythm  Heart sounds: Normal heart sounds  Pulmonary:      Effort: Pulmonary effort is normal    Abdominal:      General: Bowel sounds are normal       Palpations: Abdomen is soft  Musculoskeletal: Normal range of motion  Skin:     General: Skin is warm  Neurological:      Mental Status: She is alert and oriented to person, place, and time  LAB RESULTS     Results from last 7 days   Lab Units 03/09/21  1359   WBC Thousand/uL 6 39   HEMOGLOBIN g/dL 13 1   HEMATOCRIT % 41 6   PLATELETS Thousands/uL 178   POTASSIUM mmol/L 3 7   CHLORIDE mmol/L 104   CO2 mmol/L 30   BUN mg/dL 17   CREATININE mg/dL 1 09   EGFR ml/min/1 73sq m 50   CALCIUM mg/dL 9 2   PHOSPHORUS mg/dL 3 2           RADIOLOGY RESULTS      Reviewed      ASSESSMENT/PLAN     72-year-old female with past medical history of hypertension, diet-controlled diabetes mellitus, ischemic cardiomyopathy, CAD status post CABG, coronary artery stents, chronic kidney disease stage 3, renal artery stenosis, Hodgkin's lymphoma in remission who presents for follow-up      1  Chronic kidney disease stage 3a:  Etiology likely appears to be renovascular disease, age, hypertensive nephrosclerosis and diabetic glomerulosclerosis  Patient's baseline serum creatinine hovers between 1 2-1 4  Recent labs obtained revealed serum creatinine 1 0  and below baseline  Renal ultrasound revealed small size kidneys without any increased echogenicity  Patient was ruled out for significant postvoid residual urine retention  2  Hypertension:  Patient blood pressure is on target at 302 mm Hg systolic  3  CHF with systolic dysfunction:  Compensated  Patient appears euvolemic at present time  Will continue with Lasix and Entresto  4  Renal artery stenosis:  Longstanding history of the same  No signs of worsening renal dysfunction or flash pulmonary edema or requiring 3 number blood pressure medication to control her hypertension and therefore will continue to watch and wait  5  Chronic diarrhea:  Patient longstanding history of diarrhea and had been self treating herself with Imodium as needed  7  Anemia due to CKD:  Patient hemoglobin is 13 1 and on target for her level of CKD  8  Secondary hyperparathyroidism of renal origin:  Noted PTH intact to be on target  Twenty-five hydroxy vitamin-D insufficient  Recommended to take two tablets of vitamin D daily                 Rodrigo Trinidad MD  Nephrology  3/15/2021

## 2021-04-07 ENCOUNTER — TELEMEDICINE (OUTPATIENT)
Dept: GASTROENTEROLOGY | Facility: CLINIC | Age: 76
End: 2021-04-07
Payer: COMMERCIAL

## 2021-04-07 DIAGNOSIS — R10.84 GENERALIZED ABDOMINAL PAIN: ICD-10-CM

## 2021-04-07 DIAGNOSIS — R19.7 DIARRHEA, UNSPECIFIED TYPE: Primary | ICD-10-CM

## 2021-04-07 DIAGNOSIS — R11.0 NAUSEA: ICD-10-CM

## 2021-04-07 PROCEDURE — 99214 OFFICE O/P EST MOD 30 MIN: CPT | Performed by: INTERNAL MEDICINE

## 2021-04-07 NOTE — PROGRESS NOTES
It was my intent to perform this visit via video technology but the patient was not able to do a video connection so the visit was completed via audio telephone only  Virtual Brief Visit    Assessment/Plan:     1  Diarrhea, probably related to bile salt or bile acid malabsorption     2  Abdominal pain, generalized     3  Bloating     Problem List Items Addressed This Visit     None      Visit Diagnoses     Diarrhea, unspecified type    -  Primary    Generalized abdominal pain        Nausea                    Reason for visit is   Chief Complaint   Patient presents with    Virtual Brief Visit        Encounter provider Michelle Chand DO    Provider located at [address] [place] [id number]  [phone]    Recent Visits  No visits were found meeting these conditions  Showing recent visits within past 7 days and meeting all other requirements     Today's Visits  Date Type Provider Dept   04/07/21 Telemedicine Michelle Chand DO [facility]   Showing today's visits and meeting all other requirements     Future Appointments  No visits were found meeting these conditions  Showing future appointments within next 150 days and meeting all other requirements        After connecting through [employer] Teams and patient was informed that this is a secure, HIPAA-compliant platform  She agrees to proceed  , the patient was identified by name and date of birth  Kristin Basurto was informed that this is a telemedicine visit and that the visit is being conducted through [employer] and patient was informed that this is a secure, HIPAA-compliant platform  She agrees to proceed     My office door was closed  No one else was in the room  She acknowledged consent and understanding of privacy and security of the platform  The patient has agreed to participate and understands she can discontinue the visit at any time      Patient is aware this is a billable service  Subjective    Liz Goodman is a 70 y o  female  The states that she has been having issues with diarrhea  These began back when her gallbladder was removed in 2016  Been daily since then  We prescribed Questran for her but this was prohibitively expensive so she was taking Imodium, typically 1 in the morning as well as 1 in the afternoon  She also admits to occasional nausea  Her abdominal pain has been generalized she states the bloating is also another big problem with distention of the abdomen  After taking Imodium twice daily, generally her diarrhea problem has improved significantly  [facility]     Past Medical History:   Diagnosis Date    Aortic valvar stenosis     CAD (coronary artery disease) of bypass graft     Carotid stenosis, asymptomatic, bilateral 04/2018    50-69% b/l    Chronic CHF (congestive heart failure) (HCC)     Chronic kidney disease     Compression deformity of vertebra     L2    DM2 (diabetes mellitus, type 2) (HCC)     oral controlled    H/O Hodgkin's lymphoma     History of cervical cancer     History of ischemic cardiomyopathy     History of uterine cancer     HLD (hyperlipidemia)     HTN (hypertension)     Iliac artery stenosis, right (HCC)     common, 50%    LBBB (left bundle branch block)     Mitral regurgitation     PAD (peripheral artery disease) (HCC)     Renal artery stenosis (HCC)     s/p renal artery stent    SSS (sick sinus syndrome) ([address] )     s/p ppm       Past Surgical History:   Procedure Laterality Date    APPENDECTOMY      CARDIAC PACEMAKER PLACEMENT      CARDIAC SURGERY  09/2007    MV Repair, CABG x 2 by Dr Dayna Aviles @ [address] [place] [address]  01/2008    Redo Sternotomy, MVR #25mm St  [employer]    CHOLECYSTECTOMY      COLECTOMY      TN ECHO TRANSESOPHAG R-T 2D W/PRB IMG ACQUISJ I&R N/A 5/22/2018    Procedure: TRANSESOPHAGEAL ECHOCARDIOGRAM (LORA);   Surgeon: Mary Shepherd DO;  [facility]: [facility];  Service: Cardiac Surgery    MA REPLACE AORTIC VALVE OPENFEMORAL ARTERY APPROACH N/A 5/22/2018    Procedure: REPLACEMENT AORTIC VALVE TRANSCATHETER (TAVR) TRANSFEMORAL W/ 23 MM [place] S3 VALVE (ACCESS ON LEFT); Surgeon: Sarahi Ellis DO;  [facility];  Service: Cardiac Surgery    RENAL ARTERY STENT      TONSILLECTOMY         Current Outpatient Medications   Medication Sig Dispense Refill    acetaminophen (TYLENOL) 325 mg tablet Take 1-2 tablets Q4-6 hours PRN pain   (Patient taking differently: once as needed Take 1-2 tablets Q4-6 hours PRN pain ) 30 tablet 0    albuterol (2 5 mg/3 mL) 0 083 % nebulizer solution Inhale 2 5 mg every 6 (six) hours as needed      albuterol (PROVENTIL HFA,VENTOLIN HFA) 90 mcg/act inhaler Inhale 2 puffs every 6 (six) hours as needed      ascorbic acid (VITAMIN C) 500 mg tablet Take 1 tablet (500 mg total) by mouth daily 90 tablet 0    aspirin 81 mg chewable tablet Chew 1 tablet (81 mg total) daily 30 tablet 2    ASPIRIN 81 PO Take 81 mg by mouth daily      carvedilol (COREG) 3 125 mg tablet Take 1 tablet (3 125 mg total) by mouth 2 (two) times a day with meals (Patient taking differently: Take 3 125 mg by mouth 2 (two) times a day ) 60 tablet 2    cholecalciferol (VITAMIN D3) 1,000 units tablet Take 2 tablets (2,000 Units total) by mouth daily (Patient taking differently: Take 6,000 Units by mouth daily ) 30 tablet 1    clopidogrel (PLAVIX) 75 mg tablet Take 1 tablet (75 mg total) by mouth daily (Patient not taking: Reported on 12/19/2019) 30 tablet 2    diphenoxylate-atropine (LOMOTIL) 2 5-0 025 mg per tablet Take 1 tablet by mouth 4 (four) times a day as needed      ferrous sulfate 324 (65 Fe) mg Take 1 tablet (324 mg total) by mouth 2 (two) times a day before meals 90 tablet 0    furosemide (LASIX) 40 mg tablet Take 1 tablet (40 mg total) by mouth daily 30 tablet 2    loperamide (IMODIUM) 2 mg capsule Take 2 capsules (4 mg total) by mouth 4 (four) times a day as needed for diarrhea 30 capsule 0    pantoprazole (PROTONIX) 40 mg tablet Take 1 tablet (40 mg total) by mouth daily for 30 days 30 tablet 0    potassium chloride (K-DUR,KLOR-CON) 10 mEq tablet Take 1 tablet (10 mEq total) by mouth daily 30 tablet 2    pravastatin (PRAVACHOL) 20 mg tablet Take 2 tablets (40 mg total) by mouth daily at bedtime 60 tablet 2    sacubitril-valsartan (ENTRESTO) 24-26 MG TABS Take 1 tablet by mouth daily       traMADol (ULTRAM) 50 mg tablet Take 50 mg by mouth as needed for moderate pain       warfarin (COUMADIN) 1 mg tablet Take 2 tablets (2mg) daily at bedtime unless otherwise directed  (Patient taking differently: 2 mg daily ) 60 tablet 2     No current facility-administered medications for this visit  Allergies   Allergen Reactions    Contrast [Iodinated Diagnostic Agents] Shortness Of Breath and Throat Swelling    Ranolazine Shortness Of Breath and Vomiting     ranexa    Codeine GI Intolerance     vomit    Penicillin G Hives    Penicillins Throat Swelling    Sulfa Antibiotics GI Intolerance and Throat Swelling       Review of Systems   Constitutional: Negative for fatigue and fever  HENT: Negative for ear pain and sore throat  Gastrointestinal: Positive for abdominal distention, abdominal pain, diarrhea, nausea, rectal pain and vomiting  Negative for blood in stool and constipation  Genitourinary: Negative for difficulty urinating, dysuria and hematuria  Musculoskeletal: Negative for arthralgias, back pain and joint swelling  Skin: Negative for color change and rash  Neurological: Negative for dizziness and headaches  Psychiatric/Behavioral: Negative for behavioral problems and confusion  There were no vitals filed for this visit  I spent 13 minutes directly with the patient during this visit    VIRTUAL VISIT DISCLAIMER    Osvaldo Roman acknowledges that she has consented to an online visit or consultation   She understands that the online visit is based solely on information provided by her, and that, in the absence of a face-to-face physical evaluation by the physician, the diagnosis she receives is both limited and provisional in terms of accuracy and completeness  This is not intended to replace a full medical face-to-face evaluation by the physician  Nathan Frazier understands and accepts these terms

## 2021-05-18 ENCOUNTER — APPOINTMENT (OUTPATIENT)
Dept: URGENT CARE | Facility: CLINIC | Age: 76
End: 2021-05-18

## 2021-05-18 DIAGNOSIS — Z02.1 PHYSICAL EXAM, PRE-EMPLOYMENT: Primary | ICD-10-CM

## 2021-05-18 PROCEDURE — U0003 INFECTIOUS AGENT DETECTION BY NUCLEIC ACID (DNA OR RNA); SEVERE ACUTE RESPIRATORY SYNDROME CORONAVIRUS 2 (SARS-COV-2) (CORONAVIRUS DISEASE [COVID-19]), AMPLIFIED PROBE TECHNIQUE, MAKING USE OF HIGH THROUGHPUT TECHNOLOGIES AS DESCRIBED BY CMS-2020-01-R: HCPCS

## 2021-05-18 PROCEDURE — U0005 INFEC AGEN DETEC AMPLI PROBE: HCPCS

## 2021-05-19 LAB — SARS-COV-2 RNA RESP QL NAA+PROBE: NEGATIVE

## 2021-09-08 ENCOUNTER — TELEPHONE (OUTPATIENT)
Dept: NEPHROLOGY | Facility: CLINIC | Age: 76
End: 2021-09-08

## 2021-09-13 ENCOUNTER — TELEPHONE (OUTPATIENT)
Dept: NEPHROLOGY | Facility: CLINIC | Age: 76
End: 2021-09-13

## 2021-09-13 NOTE — TELEPHONE ENCOUNTER
I called and spoke to the patient about confirming her appointment for Tuesday 9/14/2021 6 month follow with Dr Diego Hernandez, but the patient stated that she needed to reschedule because she did not have her blood work done because she had been working a lot  Patient appointment was reschedule to 10/4/2021 and the patient understood and was okay with her new day and time of her appointment   Negar Bolivar,

## 2021-09-27 ENCOUNTER — TELEPHONE (OUTPATIENT)
Dept: NEPHROLOGY | Facility: CLINIC | Age: 76
End: 2021-09-27

## 2021-09-27 NOTE — TELEPHONE ENCOUNTER
Called pt to remind to go for labs prior to 10/04 appt  Pt stated that cant come on Mondays, Wednesdays or Fridays bc of work  Appt was canceled  Pt is on waiting list for Tuesdays or Thursdays appts only

## 2022-01-24 ENCOUNTER — TELEPHONE (OUTPATIENT)
Dept: NEPHROLOGY | Facility: CLINIC | Age: 77
End: 2022-01-24

## 2022-01-24 NOTE — TELEPHONE ENCOUNTER
Patient of Dr Oscar Kothari called stating that she needed to cancel her appointment for Wednesday 2/2/2022 nephrology follow up with Dr Oscar Kothari because the patient has COVID-23 and will call us back with she is feeling better  The patient understood that her appointment was going to be cancel at this time   Nona Alford,

## 2022-02-18 ENCOUNTER — TELEPHONE (OUTPATIENT)
Dept: NEPHROLOGY | Facility: CLINIC | Age: 77
End: 2022-02-18

## 2022-02-18 NOTE — TELEPHONE ENCOUNTER
Pt called LM to schedule appt with Dr Nabil Bowen and if its possible with dr Jorge Loera on the same day  Pt was scheduled for 03/04 at 10:30 with Dr Nabil Bowen and with Dr Jorge Loera at [phone]  Lm to call us back at [phone] to confirm these appts  Pt called and confirmed appt and will get labs done on Monday

## 2022-02-21 ENCOUNTER — APPOINTMENT (OUTPATIENT)
Dept: LAB | Facility: CLINIC | Age: 77
End: 2022-02-21
Payer: COMMERCIAL

## 2022-02-21 LAB
25(OH)D3 SERPL-MCNC: 30.5 NG/ML (ref 30–100)
ALBUMIN SERPL BCP-MCNC: 4 G/DL (ref 3.5–5)
ALP SERPL-CCNC: 76 U/L (ref 46–116)
ALT SERPL W P-5'-P-CCNC: 21 U/L (ref 12–78)
ANION GAP SERPL CALCULATED.3IONS-SCNC: 7 MMOL/L (ref 4–13)
AST SERPL W P-5'-P-CCNC: 27 U/L (ref 5–45)
BASOPHILS # BLD AUTO: 0.03 THOUSANDS/ΜL (ref 0–0.1)
BASOPHILS NFR BLD AUTO: 0 % (ref 0–1)
BILIRUB SERPL-MCNC: 0.44 MG/DL (ref 0.2–1)
BUN SERPL-MCNC: 20 MG/DL (ref 5–25)
CALCIUM SERPL-MCNC: 9.7 MG/DL (ref 8.3–10.1)
CHLORIDE SERPL-SCNC: 102 MMOL/L (ref 100–108)
CO2 SERPL-SCNC: 28 MMOL/L (ref 21–32)
CREAT SERPL-MCNC: 0.96 MG/DL (ref 0.6–1.3)
CREAT UR-MCNC: 40.6 MG/DL
EOSINOPHIL # BLD AUTO: 0.1 THOUSAND/ΜL (ref 0–0.61)
EOSINOPHIL NFR BLD AUTO: 1 % (ref 0–6)
ERYTHROCYTE [DISTWIDTH] IN BLOOD BY AUTOMATED COUNT: 14.2 % (ref 11.6–15.1)
GFR SERPL CREATININE-BSD FRML MDRD: 58 ML/MIN/1.73SQ M
GLUCOSE P FAST SERPL-MCNC: 106 MG/DL (ref 65–99)
HCT VFR BLD AUTO: 41.4 % (ref 34.8–46.1)
HGB BLD-MCNC: 13.3 G/DL (ref 11.5–15.4)
IMM GRANULOCYTES # BLD AUTO: 0.02 THOUSAND/UL (ref 0–0.2)
IMM GRANULOCYTES NFR BLD AUTO: 0 % (ref 0–2)
LYMPHOCYTES # BLD AUTO: 0.9 THOUSANDS/ΜL (ref 0.6–4.47)
LYMPHOCYTES NFR BLD AUTO: 13 % (ref 14–44)
MCH RBC QN AUTO: 29.9 PG (ref 26.8–34.3)
MCHC RBC AUTO-ENTMCNC: 32.1 G/DL (ref 31.4–37.4)
MCV RBC AUTO: 93 FL (ref 82–98)
MONOCYTES # BLD AUTO: 0.6 THOUSAND/ΜL (ref 0.17–1.22)
MONOCYTES NFR BLD AUTO: 9 % (ref 4–12)
NEUTROPHILS # BLD AUTO: 5.45 THOUSANDS/ΜL (ref 1.85–7.62)
NEUTS SEG NFR BLD AUTO: 77 % (ref 43–75)
NRBC BLD AUTO-RTO: 0 /100 WBCS
PHOSPHATE SERPL-MCNC: 3.7 MG/DL (ref 2.3–4.1)
PLATELET # BLD AUTO: 126 THOUSANDS/UL (ref 149–390)
PMV BLD AUTO: 13.2 FL (ref 8.9–12.7)
POTASSIUM SERPL-SCNC: 4.2 MMOL/L (ref 3.5–5.3)
PROT SERPL-MCNC: 7.7 G/DL (ref 6.4–8.2)
PROT UR-MCNC: <6 MG/DL
PROT/CREAT UR: <0.15 MG/G{CREAT} (ref 0–0.1)
PTH-INTACT SERPL-MCNC: 131.3 PG/ML (ref 18.4–80.1)
RBC # BLD AUTO: 4.45 MILLION/UL (ref 3.81–5.12)
SODIUM SERPL-SCNC: 137 MMOL/L (ref 136–145)
WBC # BLD AUTO: 7.1 THOUSAND/UL (ref 4.31–10.16)

## 2022-02-21 PROCEDURE — 83970 ASSAY OF PARATHORMONE: CPT | Performed by: INTERNAL MEDICINE

## 2022-02-21 PROCEDURE — 85025 COMPLETE CBC W/AUTO DIFF WBC: CPT | Performed by: INTERNAL MEDICINE

## 2022-02-21 PROCEDURE — 82570 ASSAY OF URINE CREATININE: CPT | Performed by: INTERNAL MEDICINE

## 2022-02-21 PROCEDURE — 80053 COMPREHEN METABOLIC PANEL: CPT | Performed by: INTERNAL MEDICINE

## 2022-02-21 PROCEDURE — 84156 ASSAY OF PROTEIN URINE: CPT | Performed by: INTERNAL MEDICINE

## 2022-02-21 PROCEDURE — 84100 ASSAY OF PHOSPHORUS: CPT | Performed by: INTERNAL MEDICINE

## 2022-02-21 PROCEDURE — 82306 VITAMIN D 25 HYDROXY: CPT | Performed by: INTERNAL MEDICINE

## 2022-02-21 PROCEDURE — 81001 URINALYSIS AUTO W/SCOPE: CPT | Performed by: INTERNAL MEDICINE

## 2022-02-22 LAB
BACTERIA UR QL AUTO: NORMAL /HPF
BILIRUB UR QL STRIP: NEGATIVE
CLARITY UR: CLEAR
COLOR UR: NORMAL
GLUCOSE UR STRIP-MCNC: NEGATIVE MG/DL
HGB UR QL STRIP.AUTO: NEGATIVE
HYALINE CASTS #/AREA URNS LPF: NORMAL /LPF
KETONES UR STRIP-MCNC: NEGATIVE MG/DL
LEUKOCYTE ESTERASE UR QL STRIP: NEGATIVE
NITRITE UR QL STRIP: NEGATIVE
NON-SQ EPI CELLS URNS QL MICRO: NORMAL /HPF
PH UR STRIP.AUTO: 6.5 [PH]
PROT UR STRIP-MCNC: NEGATIVE MG/DL
RBC #/AREA URNS AUTO: NORMAL /HPF
SP GR UR STRIP.AUTO: 1.01 (ref 1–1.03)
UROBILINOGEN UR QL STRIP.AUTO: 0.2 E.U./DL
WBC #/AREA URNS AUTO: NORMAL /HPF

## 2022-03-03 ENCOUNTER — TELEPHONE (OUTPATIENT)
Dept: NEPHROLOGY | Facility: CLINIC | Age: 77
End: 2022-03-03

## 2022-03-03 NOTE — TELEPHONE ENCOUNTER
I called [employer] @ [phone] (Automated System) to check eligible for the patient and as of 3/3/2022 the patient has current active coverage as of 3/1/2022  The reference number for this call is [id number]    Crispin Hernandez,

## 2022-03-04 ENCOUNTER — OFFICE VISIT (OUTPATIENT)
Dept: GASTROENTEROLOGY | Facility: CLINIC | Age: 77
End: 2022-03-04
Payer: COMMERCIAL

## 2022-03-04 ENCOUNTER — OFFICE VISIT (OUTPATIENT)
Dept: NEPHROLOGY | Facility: CLINIC | Age: 77
End: 2022-03-04
Payer: COMMERCIAL

## 2022-03-04 VITALS
HEART RATE: 80 BPM | HEIGHT: 59 IN | OXYGEN SATURATION: 98 % | WEIGHT: 113.2 LBS | DIASTOLIC BLOOD PRESSURE: 70 MMHG | RESPIRATION RATE: 16 BRPM | TEMPERATURE: 97.5 F | BODY MASS INDEX: 22.82 KG/M2 | SYSTOLIC BLOOD PRESSURE: 152 MMHG

## 2022-03-04 VITALS
HEIGHT: 60 IN | WEIGHT: 114 LBS | BODY MASS INDEX: 22.38 KG/M2 | DIASTOLIC BLOOD PRESSURE: 58 MMHG | HEART RATE: 59 BPM | OXYGEN SATURATION: 99 % | SYSTOLIC BLOOD PRESSURE: 128 MMHG

## 2022-03-04 DIAGNOSIS — N18.31 STAGE 3A CHRONIC KIDNEY DISEASE (HCC): Primary | ICD-10-CM

## 2022-03-04 DIAGNOSIS — R19.7 DIARRHEA, UNSPECIFIED TYPE: Primary | ICD-10-CM

## 2022-03-04 DIAGNOSIS — Z86.010 HISTORY OF COLON POLYPS: ICD-10-CM

## 2022-03-04 PROCEDURE — 99214 OFFICE O/P EST MOD 30 MIN: CPT | Performed by: INTERNAL MEDICINE

## 2022-03-04 PROCEDURE — 99215 OFFICE O/P EST HI 40 MIN: CPT | Performed by: INTERNAL MEDICINE

## 2022-03-04 RX ORDER — POTASSIUM CHLORIDE 750 MG/1
TABLET, FILM COATED, EXTENDED RELEASE ORAL
COMMUNITY
Start: 2022-02-07

## 2022-03-04 RX ORDER — BENZONATATE 200 MG/1
200 CAPSULE ORAL 3 TIMES DAILY
COMMUNITY
Start: 2021-12-17

## 2022-03-04 NOTE — PROGRESS NOTES
NEPHROLOGY PROGRESS NOTE    Patient: Aishwarya Grimm               Sex: female          DOA: No admission date for patient encounter  YOB: 1945        Age:  78 y o         3/4/2022        BACKGROUND     82-year-old female with past medical history of hypertension, chronic kidney disease stage 3, ischemic cardiomyopathy, ostial renal artery stenosis, CAD status post CABG followed by coronary artery stents, Hodgkin's lymphoma in remission who has been following up in the renal clinic since August 2019  SUBJECTIVE     Patient presents after 12 months  States she caught COVID in Dec 2021  Denies any hospitalizations  REVIEW OF SYSTEMS     Review of Systems   Constitutional: Negative  HENT: Negative  Eyes: Negative  Respiratory: Negative  Cardiovascular: Negative  Gastrointestinal: Negative  Endocrine: Negative  Genitourinary: Negative  Musculoskeletal: Negative  Skin: Negative  Allergic/Immunologic: Negative  Neurological: Negative  Hematological: Negative  All other systems reviewed and are negative  OBJECTIVE     Current Weight: Weight - Scale: 51 3 kg (113 lb 3 2 oz)  Vitals:    03/04/22 1002   BP: 152/70   Pulse: 80   Resp: 16   Temp: 97 5 °F (36 4 °C)   SpO2: 98%     Body mass index is 22 86 kg/m²  CURRENT MEDICATIONS       Current Outpatient Medications:     acetaminophen (TYLENOL) 325 mg tablet, Take 1-2 tablets Q4-6 hours PRN pain   (Patient taking differently: once as needed Take 1-2 tablets Q4-6 hours PRN pain ), Disp: 30 tablet, Rfl: 0    albuterol (2 5 mg/3 mL) 0 083 % nebulizer solution, Inhale 2 5 mg every 6 (six) hours as needed, Disp: , Rfl:     albuterol (PROVENTIL HFA,VENTOLIN HFA) 90 mcg/act inhaler, Inhale 2 puffs every 6 (six) hours as needed, Disp: , Rfl:     ascorbic acid (VITAMIN C) 500 mg tablet, Take 1 tablet (500 mg total) by mouth daily, Disp: 90 tablet, Rfl: 0    ASPIRIN 81 PO, Take 81 mg by mouth daily, Disp: , Rfl:     carvedilol (COREG) 3 125 mg tablet, Take 1 tablet (3 125 mg total) by mouth 2 (two) times a day with meals (Patient taking differently: Take 3 125 mg by mouth 2 (two) times a day ), Disp: 60 tablet, Rfl: 2    cholecalciferol (VITAMIN D3) 1,000 units tablet, Take 2 tablets (2,000 Units total) by mouth daily (Patient taking differently: Take 6,000 Units by mouth daily ), Disp: 30 tablet, Rfl: 1    diphenoxylate-atropine (LOMOTIL) 2 5-0 025 mg per tablet, Take 1 tablet by mouth 4 (four) times a day as needed, Disp: , Rfl:     ferrous sulfate 324 (65 Fe) mg, Take 1 tablet (324 mg total) by mouth 2 (two) times a day before meals, Disp: 90 tablet, Rfl: 0    furosemide (LASIX) 40 mg tablet, Take 1 tablet (40 mg total) by mouth daily, Disp: 30 tablet, Rfl: 2    loperamide (IMODIUM) 2 mg capsule, Take 2 capsules (4 mg total) by mouth 4 (four) times a day as needed for diarrhea, Disp: 30 capsule, Rfl: 0    pantoprazole (PROTONIX) 40 mg tablet, Take 1 tablet (40 mg total) by mouth daily for 30 days, Disp: 30 tablet, Rfl: 0    potassium chloride (Klor-Con) 10 mEq tablet, , Disp: , Rfl:     pravastatin (PRAVACHOL) 20 mg tablet, Take 2 tablets (40 mg total) by mouth daily at bedtime, Disp: 60 tablet, Rfl: 2    traMADol (ULTRAM) 50 mg tablet, Take 50 mg by mouth as needed for moderate pain , Disp: , Rfl:     warfarin (COUMADIN) 1 mg tablet, Take 2 tablets (2mg) daily at bedtime unless otherwise directed   (Patient taking differently: 2 mg daily ), Disp: 60 tablet, Rfl: 2    aspirin 81 mg chewable tablet, Chew 1 tablet (81 mg total) daily (Patient not taking: Reported on 3/4/2022 ), Disp: 30 tablet, Rfl: 2    benzonatate (TESSALON) 200 MG capsule, Take 200 mg by mouth 3 (three) times a day (Patient not taking: Reported on 3/4/2022 ), Disp: , Rfl:     clopidogrel (PLAVIX) 75 mg tablet, Take 1 tablet (75 mg total) by mouth daily (Patient not taking: Reported on 3/4/2022 ), Disp: 30 tablet, Rfl: 2    potassium chloride (K-DUR,KLOR-CON) 10 mEq tablet, Take 1 tablet (10 mEq total) by mouth daily (Patient not taking: Reported on 3/4/2022 ), Disp: 30 tablet, Rfl: 2    sacubitril-valsartan (ENTRESTO) 24-26 MG TABS, Take 1 tablet by mouth daily  (Patient not taking: Reported on 3/4/2022 ), Disp: , Rfl:       PHYSICAL EXAMINATION     Physical Exam  Constitutional:       Appearance: She is well-developed  HENT:      Head: Normocephalic and atraumatic  Eyes:      Pupils: Pupils are equal, round, and reactive to light  Cardiovascular:      Rate and Rhythm: Normal rate and regular rhythm  Heart sounds: Murmur heard  Pulmonary:      Effort: Pulmonary effort is normal    Abdominal:      General: Bowel sounds are normal       Palpations: Abdomen is soft  Musculoskeletal:         General: Normal range of motion  Cervical back: Neck supple  Skin:     General: Skin is warm  Neurological:      Mental Status: She is alert and oriented to person, place, and time  LAB RESULTS       Results from last 6 Months   Lab Units 02/21/22  0956   WBC Thousand/uL 7 10   HEMOGLOBIN g/dL 13 3   HEMATOCRIT % 41 4   PLATELETS Thousands/uL 126*   POTASSIUM mmol/L 4 2   CHLORIDE mmol/L 102   CO2 mmol/L 28   BUN mg/dL 20   CREATININE mg/dL 0 96   CALCIUM mg/dL 9 7   PHOSPHORUS mg/dL 3 7           RADIOLOGY RESULTS      Reviewed      ASSESSMENT/PLAN     79-year-old female with past medical history of hypertension, diet-controlled diabetes mellitus, ischemic cardiomyopathy, CAD status post CABG, coronary artery stents, chronic kidney disease stage 3, renal artery stenosis, Hodgkin's lymphoma in remission who presents for follow-up  1  Chronic kidney disease stage 3a:  Etiology likely appears to be renovascular disease, age, hypertensive nephrosclerosis and diabetic glomerulosclerosis  Patient's baseline serum creatinine hovers between 1 2-1 4  Recent labs revealed S Creatinine of 0 9 mg/dl and improved     Likely improvement from discontinuation of Entresto  2  Hypertension due to CKD: Patient's BP is elevated at 769 mm Hg systolic  Recommended checking BP at home and if systolic BP > 529, recommended taking two tablets of Coreg 3 125 mg in AM      3  CHF with systolic dysfunction:  Compensated  Patient appears euvolemic at present time  Will continue with Lasix 40 mg daily  4  Renal artery stenosis:  Longstanding h/o same  No intervention performed  5  Chronic diarrhea:  Stable on Imodium PRN  7  Anemia due to CKD:  Patient hemoglobin is 13 3 and on target  Recommended discontinuing Iron supplementation  8  Secondary hyperparathyroidism of renal origin:  Vitamin D is 30 and on target  PTHi is slightly above target but would hold off on adding Vitamin D analog             Soledad Varela MD  Nephrology  3/4/2022

## 2022-03-04 NOTE — PATIENT INSTRUCTIONS
Scheduled date of colonoscopy (as of today):6/13/22  Physician performing colonoscopy:Carol  Location of colonoscopy:[place]  Bowel prep reviewed with patient:miralax/dulcolax  Instructions reviewed with patient by:Andreina LEBLANC  Clearances: none

## 2022-03-05 NOTE — PROGRESS NOTES
[facility] - Outpatient Follow-up Note  Alayna Ruiz 78 y o  female MRN: [id number]  Encounter: [id number]          ASSESSMENT AND PLAN:      1  Diarrhea, unspecified type  - Colonoscopy; Future    2  History of colon polyps  - Colonoscopy; Future    Imodium AD taken one or two daily, in the morning, up to 8 per day    High fiber diet daily    Continue the probiotics as directed    _________________________________________________________________    SUBJECTIVE:  To recent comes back to the office today stating that she is experiencing fatigue as well as diarrhea  The diarrhea is intermittent in generally does respond to Imodium which she takes 1 2 tablets per day  She is currently taking probiotics as well morning time  She underwent her last colonoscopy in December 2017 which did show polyp  She denies heartburn, dysphagia, odynophagia  She denies nausea vomiting  She denies bloating but she does admit to gaseousness  There has been no rectal bleeding  She does admit to some lower abdominal pain bilaterally the there is no melena or hematemesis  The      REVIEW OF SYSTEMS IS OTHERWISE NEGATIVE        Historical Information   Past Medical History:   Diagnosis Date    Aortic valvar stenosis     CAD (coronary artery disease) of bypass graft     Carotid stenosis, asymptomatic, bilateral 04/2018    50-69% b/l    Chronic CHF (congestive heart failure) [facility])     Chronic kidney disease     Compression deformity of vertebra     L2    DM2 (diabetes mellitus, type 2) ([facility])     oral controlled    H/O Hodgkin's lymphoma     History of cervical cancer     History of ischemic cardiomyopathy     History of uterine cancer     HLD (hyperlipidemia)     HTN (hypertension)     Iliac artery stenosis, right (HCC)     common, 50%    LBBB (left bundle branch block)     Mitral regurgitation     PAD (peripheral artery disease) ([facility])     Renal artery stenosis ([facility])     s/p renal artery stent    SSS (sick sinus syndrome) ([facility] [address] )     s/p ppm     Past Surgical History:   Procedure Laterality Date    APPENDECTOMY      CARDIAC PACEMAKER PLACEMENT      CARDIAC SURGERY  2007    MV Repair, CABG x 2 by Dr Armando Moreland @ [address] [place] [address]  2008    Redo Sternotomy, MVR #25mm St  [employer]    CHOLECYSTECTOMY      COLECTOMY      WA ECHO TRANSESOPHAG R-T 2D W/PRB IMG ACQUISJ I&R N/A 2018    Procedure: TRANSESOPHAGEAL ECHOCARDIOGRAM (LORA); Surgeon: Luis Daniel Ambrosio DO;  [facility]: [facility];  Service: Cardiac Surgery    WA REPLACE AORTIC VALVE OPENFEMORAL ARTERY APPROACH N/A 2018    Procedure: REPLACEMENT AORTIC VALVE TRANSCATHETER (TAVR) TRANSFEMORAL W/ 23 MM HUGHES KOKO S3 VALVE (ACCESS ON LEFT);   Surgeon: Luis Daniel Ambrosio DO;  Location: [facility];  Service: Cardiac Surgery    RENAL ARTERY STENT      TONSILLECTOMY       Social History   Social History     Substance and Sexual Activity   Alcohol Use No     Social History     Substance and Sexual Activity   Drug Use No     Social History     Tobacco Use   Smoking Status Former Smoker    Years: 15 00    Types: Cigarettes    Quit date:     Years since quittin 1   Smokeless Tobacco Never Used     Family History   Problem Relation Age of Onset    Coronary artery disease Father     Stomach cancer Father     Cancer Father     Cervical cancer Mother     Breast cancer Maternal Aunt        Meds/Allergies       Current Outpatient Medications:     acetaminophen (TYLENOL) 325 mg tablet    albuterol (2 5 mg/3 mL) 0 083 % nebulizer solution    albuterol (PROVENTIL HFA,VENTOLIN HFA) 90 mcg/act inhaler    ascorbic acid (VITAMIN C) 500 mg tablet    aspirin 81 mg chewable tablet    ASPIRIN 81 PO    benzonatate (TESSALON) 200 MG capsule    carvedilol (COREG) 3 125 mg tablet    cholecalciferol (VITAMIN D3) 1,000 units tablet    clopidogrel (PLAVIX) 75 mg tablet    diphenoxylate-atropine (LOMOTIL) 2 5-0 025 mg per tablet    ferrous sulfate 324 (65 Fe) mg    furosemide (LASIX) 40 mg tablet    loperamide (IMODIUM) 2 mg capsule    potassium chloride (K-DUR,KLOR-CON) 10 mEq tablet    potassium chloride (Klor-Con) 10 mEq tablet    pravastatin (PRAVACHOL) 20 mg tablet    sacubitril-valsartan (ENTRESTO) 24-26 MG TABS    traMADol (ULTRAM) 50 mg tablet    warfarin (COUMADIN) 1 mg tablet    pantoprazole (PROTONIX) 40 mg tablet    Allergies   Allergen Reactions    Contrast [Iodinated Diagnostic Agents] Shortness Of Breath and Throat Swelling    Ranolazine Shortness Of Breath and Vomiting     ranexa    Codeine GI Intolerance     vomit    Penicillin G Hives    Penicillins Throat Swelling    Sulfa Antibiotics GI Intolerance and Throat Swelling           Objective     Blood pressure 128/58, pulse 59, height 4' 11 5" (1 511 m), weight 51 7 kg (114 lb), SpO2 99 %  Body mass index is 22 64 kg/m²  PHYSICAL EXAM:      General Appearance:   Alert, cooperative, no distress   HEENT:   Normocephalic, atraumatic, anicteric      Neck:  Supple, symmetrical, trachea midline   Lungs:   Clear to auscultation bilaterally; no rales, rhonchi or wheezing; respirations unlabored    Heart[de-identified]   Regular rate and rhythm; no murmur, rub, or gallop  Abdomen:   Soft, non-tender, non-distended; normal bowel sounds; no masses, no organomegaly    Genitalia:   Deferred    Rectal:   Deferred    Extremities:  No cyanosis, clubbing or edema    Pulses:  2+ and symmetric    Skin:  No jaundice, rashes, or lesions    Lymph nodes:  No palpable cervical lymphadenopathy        Lab Results:   No visits with results within 1 Day(s) from this visit  Latest known visit with results is:   Appointment on 05/18/2021   Component Date Value    SARS-CoV-2 05/18/2021 Negative          Radiology Results:   No results found

## 2023-01-19 ENCOUNTER — TELEPHONE (OUTPATIENT)
Dept: NEPHROLOGY | Facility: CLINIC | Age: 78
End: 2023-01-19

## 2023-01-19 NOTE — TELEPHONE ENCOUNTER
I called and spoke to the patient and schedule her for her nephrology follow up appointment from our recall list  The patient understood and was okay with the day and time of her next appointment

## 2023-05-18 ENCOUNTER — TELEPHONE (OUTPATIENT)
Dept: NEPHROLOGY | Facility: CLINIC | Age: 78
End: 2023-05-18

## 2023-05-18 NOTE — TELEPHONE ENCOUNTER
Called pt to remind her about 05/26 appt  Pt stated that has another appt to see nephrologist closer home and would like to cancel the one with Dr Gillermina Galeazzi  Pt stated that is old and its been hard for her to travel  Pt also asked to inform Dr Venkat Wu office to inform them that wont be coming to see him as well  Spoke to Leon from [place] and passed the message  Appt was canceled

## 2024-06-04 ENCOUNTER — TELEPHONE (OUTPATIENT)
Dept: SURGERY | Facility: CLINIC | Age: 79
End: 2024-06-04

## (undated) DEVICE — Device

## (undated) DEVICE — CARDIOVASCULAR SPLIT DRAPE: Brand: CONVERTORS

## (undated) DEVICE — ADHESIVE SKN CLSR HISTOACRYL FLEX 0.5ML LF

## (undated) DEVICE — GLOVE INDICATOR PI UNDERGLOVE SZ 7 BLUE

## (undated) DEVICE — DEFIB ADULT ELECTRODE CARDINAL

## (undated) DEVICE — INTENDED FOR TISSUE SEPARATION, AND OTHER PROCEDURES THAT REQUIRE A SHARP SURGICAL BLADE TO PUNCTURE OR CUT.: Brand: BARD-PARKER ® CARBON RIB-BACK BLADES

## (undated) DEVICE — THERMOFLECT BLANKET, L, 25EA                               TS THERMOFLECT BLANKET, 48" X 84", SILVER, 5/BG, 5 BG/CS NW: Brand: THERMOFLECT

## (undated) DEVICE — GLOVE SRG BIOGEL ECLIPSE 7

## (undated) DEVICE — CARDIO PERI-GROIN: Brand: CONVERTORS

## (undated) DEVICE — STERILE MAJOR GENERAL PACK: Brand: CARDINAL HEALTH

## (undated) DEVICE — GAUZE SPONGES,USP TYPE VII GAUZE, 12 PLY: Brand: CURITY

## (undated) DEVICE — HEAVY DUTY TABLE COVER: Brand: CONVERTORS

## (undated) DEVICE — SCD SEQUENTIAL COMPRESSION COMFORT SLEEVE MEDIUM KNEE LENGTH: Brand: KENDALL SCD

## (undated) DEVICE — TRAY FOLEY 16FR SURESTEP TEMP SENS URIMETER STAT LOK

## (undated) DEVICE — CHECK-FLO PERFORMER INTRODUCER: Brand: CHECK-FLO

## (undated) DEVICE — SUT SILK 0 CT-1 30 IN 424H

## (undated) DEVICE — 3000CC GUARDIAN II: Brand: GUARDIAN

## (undated) DEVICE — RADIFOCUS GLIDEWIRE: Brand: GLIDEWIRE

## (undated) DEVICE — CHLORAPREP HI-LITE 26ML ORANGE

## (undated) DEVICE — 3M™ TEGADERM™ TRANSPARENT FILM DRESSING FRAME STYLE, 1626W, 4 IN X 4-3/4 IN (10 CM X 12 CM), 50/CT 4CT/CASE: Brand: 3M™ TEGADERM™